# Patient Record
Sex: FEMALE | Race: WHITE | Employment: OTHER | URBAN - NONMETROPOLITAN AREA
[De-identification: names, ages, dates, MRNs, and addresses within clinical notes are randomized per-mention and may not be internally consistent; named-entity substitution may affect disease eponyms.]

---

## 2017-03-16 ENCOUNTER — OFFICE VISIT (OUTPATIENT)
Dept: VASCULAR SURGERY | Age: 62
End: 2017-03-16
Payer: MEDICARE

## 2017-03-16 VITALS
OXYGEN SATURATION: 94 % | SYSTOLIC BLOOD PRESSURE: 130 MMHG | DIASTOLIC BLOOD PRESSURE: 74 MMHG | RESPIRATION RATE: 18 BRPM | HEART RATE: 73 BPM | TEMPERATURE: 97.6 F

## 2017-03-16 DIAGNOSIS — I73.9 PVD (PERIPHERAL VASCULAR DISEASE) (HCC): ICD-10-CM

## 2017-03-16 PROCEDURE — G8484 FLU IMMUNIZE NO ADMIN: HCPCS | Performed by: NURSE PRACTITIONER

## 2017-03-16 PROCEDURE — 3014F SCREEN MAMMO DOC REV: CPT | Performed by: NURSE PRACTITIONER

## 2017-03-16 PROCEDURE — 99213 OFFICE O/P EST LOW 20 MIN: CPT | Performed by: NURSE PRACTITIONER

## 2017-03-16 PROCEDURE — 4004F PT TOBACCO SCREEN RCVD TLK: CPT | Performed by: NURSE PRACTITIONER

## 2017-03-16 PROCEDURE — 3017F COLORECTAL CA SCREEN DOC REV: CPT | Performed by: NURSE PRACTITIONER

## 2017-03-16 PROCEDURE — G8421 BMI NOT CALCULATED: HCPCS | Performed by: NURSE PRACTITIONER

## 2017-03-16 PROCEDURE — G8427 DOCREV CUR MEDS BY ELIG CLIN: HCPCS | Performed by: NURSE PRACTITIONER

## 2017-03-17 PROBLEM — I73.9 PVD (PERIPHERAL VASCULAR DISEASE) (HCC): Status: ACTIVE | Noted: 2017-03-17

## 2017-06-15 ENCOUNTER — OFFICE VISIT (OUTPATIENT)
Dept: CARDIOLOGY | Facility: CLINIC | Age: 62
End: 2017-06-15

## 2017-06-15 VITALS
WEIGHT: 175 LBS | BODY MASS INDEX: 31.01 KG/M2 | SYSTOLIC BLOOD PRESSURE: 150 MMHG | DIASTOLIC BLOOD PRESSURE: 90 MMHG | RESPIRATION RATE: 18 BRPM | HEIGHT: 63 IN

## 2017-06-15 DIAGNOSIS — Z72.0 TOBACCO USE: ICD-10-CM

## 2017-06-15 DIAGNOSIS — R06.00 DYSPNEA, UNSPECIFIED TYPE: ICD-10-CM

## 2017-06-15 DIAGNOSIS — R07.9 CHEST PAIN, UNSPECIFIED TYPE: Primary | ICD-10-CM

## 2017-06-15 DIAGNOSIS — I10 ESSENTIAL HYPERTENSION: ICD-10-CM

## 2017-06-15 PROCEDURE — 99214 OFFICE O/P EST MOD 30 MIN: CPT | Performed by: NURSE PRACTITIONER

## 2017-06-15 PROCEDURE — 93000 ELECTROCARDIOGRAM COMPLETE: CPT | Performed by: NURSE PRACTITIONER

## 2017-06-15 RX ORDER — VARENICLINE TARTRATE 0.5 MG/1
0.5 TABLET, FILM COATED ORAL 2 TIMES DAILY
COMMUNITY

## 2017-06-15 RX ORDER — OMEPRAZOLE 20 MG/1
CAPSULE, DELAYED RELEASE ORAL
COMMUNITY
End: 2017-06-15 | Stop reason: ALTCHOICE

## 2017-06-15 RX ORDER — AMITRIPTYLINE HYDROCHLORIDE 10 MG/1
10 TABLET, FILM COATED ORAL NIGHTLY
COMMUNITY

## 2017-06-15 RX ORDER — ALBUTEROL SULFATE 0.63 MG/3ML
SOLUTION RESPIRATORY (INHALATION)
COMMUNITY

## 2017-06-15 RX ORDER — LISINOPRIL 10 MG/1
TABLET ORAL
COMMUNITY
End: 2017-06-15 | Stop reason: SDUPTHER

## 2017-06-15 RX ORDER — DILTIAZEM HYDROCHLORIDE 180 MG/1
180 CAPSULE, COATED, EXTENDED RELEASE ORAL DAILY
COMMUNITY

## 2017-06-15 RX ORDER — CYCLOBENZAPRINE HCL 10 MG
10 TABLET ORAL 3 TIMES DAILY PRN
COMMUNITY
End: 2021-03-08

## 2017-06-15 RX ORDER — DICYCLOMINE HYDROCHLORIDE 10 MG/1
CAPSULE ORAL
COMMUNITY
End: 2017-07-27

## 2017-06-15 RX ORDER — LORATADINE 10 MG/1
10 TABLET ORAL DAILY
COMMUNITY
End: 2021-03-08

## 2017-06-15 RX ORDER — ALPRAZOLAM 0.25 MG/1
TABLET ORAL
COMMUNITY
End: 2017-07-27

## 2017-06-15 RX ORDER — LISINOPRIL 30 MG/1
30 TABLET ORAL DAILY
Qty: 30 TABLET | Refills: 11 | Status: SHIPPED | OUTPATIENT
Start: 2017-06-15 | End: 2017-07-27

## 2017-06-15 RX ORDER — ATORVASTATIN CALCIUM 20 MG/1
TABLET, FILM COATED ORAL
COMMUNITY
Start: 2015-02-23 | End: 2017-07-27

## 2017-06-15 RX ORDER — FUROSEMIDE 20 MG/1
40 TABLET ORAL
COMMUNITY
End: 2017-07-27

## 2017-06-15 RX ORDER — HYDROCODONE BITARTRATE AND ACETAMINOPHEN 7.5; 325 MG/1; MG/1
1 TABLET ORAL EVERY 4 HOURS PRN
COMMUNITY
End: 2021-02-21 | Stop reason: HOSPADM

## 2017-06-15 RX ORDER — GABAPENTIN 100 MG/1
CAPSULE ORAL 2 TIMES DAILY
COMMUNITY
End: 2017-07-27

## 2017-06-15 NOTE — PROGRESS NOTES
"    Subjective:     Encounter Date:06/15/2017      Patient ID: Yolanda Jacobs is a 61 y.o. female.    Chief Complaint:  HPI Comments: Pt reports intermittent chest pain with or without exertion, CASTELLANOS, fatigue, edema, and weight gain. She was started on lasix for edema and elevated BNP by per pcp recently.     Chest Pain    This is a recurrent problem. The current episode started more than 1 month ago. The problem has been gradually worsening. Associated symptoms include malaise/fatigue, shortness of breath and weakness. Pertinent negatives include no abdominal pain, claudication, cough, diaphoresis, dizziness, fever, hemoptysis, irregular heartbeat, nausea, near-syncope, orthopnea, palpitations, PND, sputum production, syncope or vomiting.       The following portions of the patient's history were reviewed and updated as appropriate: allergies, current medications, past family history, past medical history, past social history, past surgical history and problem list.  /90 (BP Location: Left arm, Patient Position: Sitting, Cuff Size: Adult)  Resp 18  Ht 63\" (160 cm)  Wt 175 lb (79.4 kg)  Breastfeeding? No  BMI 31 kg/m2  Allergies   Allergen Reactions   • Ceftin [Cefuroxime Axetil]    • Iodinated Diagnostic Agents        Current Outpatient Prescriptions:   •  albuterol (ACCUNEB) 0.63 MG/3ML nebulizer solution, Take 1 ampule by nebulization every 6 hours as needed for Wheezing, Disp: , Rfl:   •  ALPRAZolam (XANAX) 0.25 MG tablet, Take  by mouth., Disp: , Rfl:   •  amitriptyline (ELAVIL) 10 MG tablet, Take  by mouth., Disp: , Rfl:   •  atorvastatin (LIPITOR) 20 MG tablet, Take  by mouth., Disp: , Rfl:   •  cyclobenzaprine (FLEXERIL) 10 MG tablet, Take  by mouth., Disp: , Rfl:   •  dicyclomine (BENTYL) 10 MG capsule, Take  by mouth., Disp: , Rfl:   •  diltiaZEM CD (CARDIZEM CD) 180 MG 24 hr capsule, Take  by mouth., Disp: , Rfl:   •  esomeprazole (nexIUM) 20 MG capsule, Take 20 mg by mouth Every Morning Before " Breakfast., Disp: , Rfl:   •  fluticasone-salmeterol (ADVAIR) 250-50 MCG/DOSE DISKUS, Inhale., Disp: , Rfl:   •  furosemide (LASIX) 20 MG tablet, Take 40 mg by mouth., Disp: , Rfl:   •  gabapentin (NEURONTIN) 100 MG capsule, Take  by mouth., Disp: , Rfl:   •  HYDROcodone-acetaminophen (NORCO) 7.5-325 MG per tablet, Take  by mouth., Disp: , Rfl:   •  insulin detemir (LEVEMIR) 100 UNIT/ML injection, Inject  under the skin., Disp: , Rfl:   •  lisinopril (PRINIVIL,ZESTRIL) 30 MG tablet, Take 1 tablet by mouth Daily., Disp: 30 tablet, Rfl: 11  •  loratadine (CLARITIN) 10 MG tablet, Take  by mouth., Disp: , Rfl:   •  metFORMIN (GLUCOPHAGE) 1000 MG tablet, Take  by mouth., Disp: , Rfl:   •  omeprazole (priLOSEC) 20 MG capsule, Take  by mouth., Disp: , Rfl:   •  varenicline (CHANTIX) 0.5 MG tablet, Take 0.5 mg by mouth 2 (Two) Times a Day., Disp: , Rfl:   Past Medical History:   Diagnosis Date   • COPD (chronic obstructive pulmonary disease)    • Diabetes mellitus    • HTN (hypertension)    • Hyperlipidemia      Past Surgical History:   Procedure Laterality Date   • APPENDECTOMY     • CHOLECYSTECTOMY     • HYSTERECTOMY     • KNEE SURGERY       Social History     Social History   • Marital status:      Spouse name: N/A   • Number of children: N/A   • Years of education: N/A     Occupational History   • Not on file.     Social History Main Topics   • Smoking status: Current Every Day Smoker     Packs/day: 0.50   • Smokeless tobacco: Never Used   • Alcohol use No   • Drug use: No   • Sexual activity: Defer     Other Topics Concern   • Not on file     Social History Narrative     Family History   Problem Relation Age of Onset   • Diabetes Mother    • Cancer Mother    • Lung cancer Father    • No Known Problems Sister    • Cancer Brother        Review of Systems   Constitution: Positive for weakness, malaise/fatigue and weight gain. Negative for chills, diaphoresis and fever.   HENT: Negative for nosebleeds.    Eyes:  Negative for visual disturbance.   Cardiovascular: Positive for chest pain, dyspnea on exertion and leg swelling. Negative for claudication, cyanosis, irregular heartbeat, near-syncope, orthopnea, palpitations, paroxysmal nocturnal dyspnea and syncope.   Respiratory: Positive for shortness of breath. Negative for cough, hemoptysis, sputum production and wheezing.    Hematologic/Lymphatic: Negative for bleeding problem.   Skin: Negative for color change and flushing.   Musculoskeletal: Negative for falls.   Gastrointestinal: Negative for bloating, abdominal pain, hematemesis, hematochezia, melena, nausea and vomiting.   Genitourinary: Negative for hematuria.   Neurological: Negative for dizziness and light-headedness.   Psychiatric/Behavioral: Negative for altered mental status.         ECG 12 Lead  Date/Time: 6/15/2017 11:47 AM  Performed by: JOSE WORKMAN  Authorized by: JOSE WORKMAN   Comparison: compared with previous ECG from 7/22/2016  Similar to previous ECG  Rhythm: sinus rhythm               Objective:     Physical Exam   Constitutional: She is oriented to person, place, and time. She appears well-developed and well-nourished. No distress.   HENT:   Head: Normocephalic and atraumatic.   Eyes: Pupils are equal, round, and reactive to light.   Neck: Normal range of motion. Neck supple. No JVD present. No thyromegaly present.   Cardiovascular: Normal rate, regular rhythm, normal heart sounds and intact distal pulses.  Exam reveals no gallop and no friction rub.    No murmur heard.  Pulses:       Dorsalis pedis pulses are 2+ on the right side, and 2+ on the left side.   Pulmonary/Chest: Effort normal and breath sounds normal. No respiratory distress. She has no wheezes. She has no rales. She exhibits no tenderness.   Abdominal: Soft. Bowel sounds are normal. She exhibits no distension. There is no tenderness.   Musculoskeletal: Normal range of motion. She exhibits no edema.   Neurological: She is alert and  oriented to person, place, and time. No cranial nerve deficit.   Skin: Skin is warm and dry. She is not diaphoretic.   Psychiatric: She has a normal mood and affect. Her behavior is normal.       Lab Review:       Assessment:          Diagnosis Plan   1. Chest pain, unspecified type  Adult Stress Echo Only    Adult Transthoracic Echo Complete With Contrast   2. Dyspnea, unspecified type  Adult Stress Echo Only    Adult Transthoracic Echo Complete With Contrast  Elevated  BNP and reported edema- currently euvolemic on exam, however lasix was started by pcp. Check echo. Also repeat stress due to worsening CASTELLANOS and chest discomfort over the past couple of months.   Negative DSE 1 year ago. LVH was noted on stress at that time.    3. Essential hypertension  Elevated. Increase lisinopril to 30 mg daily    4. Tobacco use  Counseled on cessation x 5 minutes           Plan:       Follow up 1 month, sooner with new or worsening symptoms.  Counseled on s/s to report and when to report to ER.

## 2017-06-26 ENCOUNTER — HOSPITAL ENCOUNTER (OUTPATIENT)
Dept: CARDIOLOGY | Facility: HOSPITAL | Age: 62
Discharge: HOME | End: 2017-06-26

## 2017-06-26 ENCOUNTER — HOSPITAL ENCOUNTER (OUTPATIENT)
Dept: CARDIOLOGY | Facility: HOSPITAL | Age: 62
Discharge: HOME | End: 2017-06-26
Admitting: NURSE PRACTITIONER

## 2017-06-26 VITALS
DIASTOLIC BLOOD PRESSURE: 58 MMHG | WEIGHT: 168 LBS | SYSTOLIC BLOOD PRESSURE: 146 MMHG | HEIGHT: 63 IN | BODY MASS INDEX: 29.77 KG/M2

## 2017-06-26 VITALS — DIASTOLIC BLOOD PRESSURE: 74 MMHG | HEART RATE: 73 BPM | SYSTOLIC BLOOD PRESSURE: 149 MMHG

## 2017-06-26 DIAGNOSIS — R07.9 CHEST PAIN, UNSPECIFIED TYPE: ICD-10-CM

## 2017-06-26 DIAGNOSIS — R06.00 DYSPNEA, UNSPECIFIED TYPE: ICD-10-CM

## 2017-06-26 LAB
BH CV ECHO MEAS - AO MAX PG (FULL): 3.8 MMHG
BH CV ECHO MEAS - AO MAX PG: 8.4 MMHG
BH CV ECHO MEAS - AO MEAN PG (FULL): 2 MMHG
BH CV ECHO MEAS - AO MEAN PG: 4 MMHG
BH CV ECHO MEAS - AO ROOT AREA (BSA CORRECTED): 1.9
BH CV ECHO MEAS - AO ROOT AREA: 9.1 CM^2
BH CV ECHO MEAS - AO ROOT DIAM: 3.4 CM
BH CV ECHO MEAS - AO V2 MAX: 145 CM/SEC
BH CV ECHO MEAS - AO V2 MEAN: 93.9 CM/SEC
BH CV ECHO MEAS - AO V2 VTI: 30.2 CM
BH CV ECHO MEAS - AVA(I,A): 2.2 CM^2
BH CV ECHO MEAS - AVA(I,D): 2.2 CM^2
BH CV ECHO MEAS - AVA(V,A): 2.1 CM^2
BH CV ECHO MEAS - AVA(V,D): 2.1 CM^2
BH CV ECHO MEAS - BSA(HAYCOCK): 1.9 M^2
BH CV ECHO MEAS - BSA: 1.8 M^2
BH CV ECHO MEAS - BZI_BMI: 29.8 KILOGRAMS/M^2
BH CV ECHO MEAS - BZI_METRIC_HEIGHT: 160 CM
BH CV ECHO MEAS - BZI_METRIC_WEIGHT: 76.2 KG
BH CV ECHO MEAS - CONTRAST EF 4CH: 63 ML/M^2
BH CV ECHO MEAS - EDV(CUBED): 67.9 ML
BH CV ECHO MEAS - EDV(MOD-SP4): 107 ML
BH CV ECHO MEAS - EDV(TEICH): 73.4 ML
BH CV ECHO MEAS - EF(CUBED): 70.7 %
BH CV ECHO MEAS - EF(MOD-SP4): 63 %
BH CV ECHO MEAS - EF(TEICH): 62.8 %
BH CV ECHO MEAS - ESV(CUBED): 19.9 ML
BH CV ECHO MEAS - ESV(MOD-SP4): 39.6 ML
BH CV ECHO MEAS - ESV(TEICH): 27.3 ML
BH CV ECHO MEAS - FS: 33.6 %
BH CV ECHO MEAS - IVS/LVPW: 1.2
BH CV ECHO MEAS - IVSD: 1.4 CM
BH CV ECHO MEAS - LA DIMENSION: 4 CM
BH CV ECHO MEAS - LA/AO: 1.2
BH CV ECHO MEAS - LAT PEAK E' VEL: 4.9 CM/SEC
BH CV ECHO MEAS - LV DIASTOLIC VOL/BSA (35-75): 59.6 ML/M^2
BH CV ECHO MEAS - LV MASS(C)D: 194.3 GRAMS
BH CV ECHO MEAS - LV MASS(C)DI: 108.2 GRAMS/M^2
BH CV ECHO MEAS - LV MAX PG: 4.6 MMHG
BH CV ECHO MEAS - LV MEAN PG: 2 MMHG
BH CV ECHO MEAS - LV SYSTOLIC VOL/BSA (12-30): 22.1 ML/M^2
BH CV ECHO MEAS - LV V1 MAX: 107 CM/SEC
BH CV ECHO MEAS - LV V1 MEAN: 68.2 CM/SEC
BH CV ECHO MEAS - LV V1 VTI: 23.9 CM
BH CV ECHO MEAS - LVIDD: 4.1 CM
BH CV ECHO MEAS - LVIDS: 2.7 CM
BH CV ECHO MEAS - LVLD AP4: 7.8 CM
BH CV ECHO MEAS - LVLS AP4: 6.6 CM
BH CV ECHO MEAS - LVOT AREA (M): 2.8 CM^2
BH CV ECHO MEAS - LVOT AREA: 2.8 CM^2
BH CV ECHO MEAS - LVOT DIAM: 1.9 CM
BH CV ECHO MEAS - LVPWD: 1.2 CM
BH CV ECHO MEAS - MED PEAK E' VEL: 3.59 CM/SEC
BH CV ECHO MEAS - MV A MAX VEL: 97 CM/SEC
BH CV ECHO MEAS - MV DEC TIME: 0.3 SEC
BH CV ECHO MEAS - MV E MAX VEL: 61.6 CM/SEC
BH CV ECHO MEAS - MV E/A: 0.64
BH CV ECHO MEAS - RAP SYSTOLE: 10 MMHG
BH CV ECHO MEAS - RVSP: 27 MMHG
BH CV ECHO MEAS - SI(AO): 152.7 ML/M^2
BH CV ECHO MEAS - SI(CUBED): 26.7 ML/M^2
BH CV ECHO MEAS - SI(LVOT): 37.7 ML/M^2
BH CV ECHO MEAS - SI(MOD-SP4): 37.5 ML/M^2
BH CV ECHO MEAS - SI(TEICH): 25.7 ML/M^2
BH CV ECHO MEAS - SV(AO): 274.2 ML
BH CV ECHO MEAS - SV(CUBED): 48 ML
BH CV ECHO MEAS - SV(LVOT): 67.8 ML
BH CV ECHO MEAS - SV(MOD-SP4): 67.4 ML
BH CV ECHO MEAS - SV(TEICH): 46.1 ML
BH CV ECHO MEAS - TR MAX VEL: 206 CM/SEC
BH CV STRESS BP STAGE 1: NORMAL
BH CV STRESS BP STAGE 2: NORMAL
BH CV STRESS BP STAGE 3: NORMAL
BH CV STRESS DOSE DOBUTAMINE STAGE 1: 10
BH CV STRESS DOSE DOBUTAMINE STAGE 2: 20
BH CV STRESS DOSE DOBUTAMINE STAGE 3: 30
BH CV STRESS DURATION MIN STAGE 1: 3
BH CV STRESS DURATION MIN STAGE 2: 3
BH CV STRESS DURATION MIN STAGE 3: 1
BH CV STRESS DURATION SEC STAGE 1: 0
BH CV STRESS DURATION SEC STAGE 2: 0
BH CV STRESS DURATION SEC STAGE 3: 36
BH CV STRESS HR STAGE 1: 91
BH CV STRESS HR STAGE 2: 133
BH CV STRESS HR STAGE 3: 144
BH CV STRESS PROTOCOL 1: NORMAL
BH CV STRESS RECOVERY BP: NORMAL MMHG
BH CV STRESS RECOVERY HR: 98 BPM
BH CV STRESS STAGE 1: 1
BH CV STRESS STAGE 2: 2
BH CV STRESS STAGE 3: 3
LEFT ATRIUM VOLUME INDEX: 42.8 ML/M2
LEFT ATRIUM VOLUME: 23.8 CM3
LV EF 2D ECHO EST: 65 %
MAXIMAL PREDICTED HEART RATE: 161 BPM
PERCENT MAX PREDICTED HR: 89.44 %
STRESS BASELINE BP: NORMAL MMHG
STRESS BASELINE HR: 72 BPM
STRESS PERCENT HR: 105 %
STRESS POST EXERCISE DUR MIN: 7 MIN
STRESS POST EXERCISE DUR SEC: 36 SEC
STRESS POST PEAK BP: NORMAL MMHG
STRESS POST PEAK HR: 144 BPM
STRESS TARGET HR: 137 BPM

## 2017-06-26 PROCEDURE — 25010000003 DOBUTAMINE PER 250 MG: Performed by: INTERNAL MEDICINE

## 2017-06-26 PROCEDURE — 25010000002 PERFLUTREN (DEFINITY) 8.476 MG IN SODIUM CHLORIDE 10 ML INJECTION: Performed by: INTERNAL MEDICINE

## 2017-06-26 PROCEDURE — 93017 CV STRESS TEST TRACING ONLY: CPT

## 2017-06-26 PROCEDURE — 63710000001 NITROGLYCERIN 0.4 MG SUBLINGUAL TABLET 25 EACH BOTTLE: Performed by: INTERNAL MEDICINE

## 2017-06-26 PROCEDURE — 93350 STRESS TTE ONLY: CPT | Performed by: INTERNAL MEDICINE

## 2017-06-26 PROCEDURE — A9270 NON-COVERED ITEM OR SERVICE: HCPCS | Performed by: INTERNAL MEDICINE

## 2017-06-26 PROCEDURE — 93352 ADMIN ECG CONTRAST AGENT: CPT | Performed by: INTERNAL MEDICINE

## 2017-06-26 PROCEDURE — 93306 TTE W/DOPPLER COMPLETE: CPT

## 2017-06-26 PROCEDURE — 93018 CV STRESS TEST I&R ONLY: CPT | Performed by: INTERNAL MEDICINE

## 2017-06-26 PROCEDURE — C8928 TTE W OR W/O FOL W/CON,STRES: HCPCS

## 2017-06-26 RX ORDER — NITROGLYCERIN 0.4 MG/1
0.4 TABLET SUBLINGUAL
Status: DISCONTINUED | OUTPATIENT
Start: 2017-06-26 | End: 2017-06-27 | Stop reason: HOSPADM

## 2017-06-26 RX ORDER — DOBUTAMINE HYDROCHLORIDE 100 MG/100ML
10-50 INJECTION INTRAVENOUS
Status: DISCONTINUED | OUTPATIENT
Start: 2017-06-26 | End: 2017-06-27 | Stop reason: HOSPADM

## 2017-06-26 RX ADMIN — SODIUM CHLORIDE 10 ML: 9 INJECTION INTRAMUSCULAR; INTRAVENOUS; SUBCUTANEOUS at 10:03

## 2017-06-26 RX ADMIN — DOBUTAMINE 30 MCG/KG/MIN: 12.5 INJECTION, SOLUTION, CONCENTRATE INTRAVENOUS at 09:56

## 2017-06-26 RX ADMIN — NITROGLYCERIN 0.4 MG: 0.4 TABLET SUBLINGUAL at 10:13

## 2017-06-26 NOTE — PROGRESS NOTES
I called pt to let her know the results and that she needs a [facility] DNA test.  She wants to wait until after July 3rd to have it done because of the travel distance to [place].  I told her the paperwork/order will be at my desk when she comes to get it done she will need to come to our office to pick this up before going down to the lab.  She stated understanding.  Delio Beavers, CMA

## 2017-07-17 ENCOUNTER — TELEPHONE (OUTPATIENT)
Dept: CARDIOLOGY | Facility: CLINIC | Age: 62
End: 2017-07-17

## 2017-07-17 ENCOUNTER — OFFICE VISIT (OUTPATIENT)
Dept: CARDIOLOGY | Facility: CLINIC | Age: 62
End: 2017-07-17

## 2017-07-17 VITALS
RESPIRATION RATE: 18 BRPM | BODY MASS INDEX: 29.59 KG/M2 | SYSTOLIC BLOOD PRESSURE: 135 MMHG | DIASTOLIC BLOOD PRESSURE: 80 MMHG | HEART RATE: 106 BPM | WEIGHT: 167 LBS | HEIGHT: 63 IN

## 2017-07-17 DIAGNOSIS — Z72.0 TOBACCO USE: ICD-10-CM

## 2017-07-17 DIAGNOSIS — IMO0001 UNCONTROLLED TYPE 2 DIABETES MELLITUS WITHOUT COMPLICATION, WITHOUT LONG-TERM CURRENT USE OF INSULIN: ICD-10-CM

## 2017-07-17 DIAGNOSIS — R07.9 CHEST PAIN, UNSPECIFIED TYPE: Primary | ICD-10-CM

## 2017-07-17 DIAGNOSIS — I10 ESSENTIAL HYPERTENSION: ICD-10-CM

## 2017-07-17 DIAGNOSIS — R06.09 DOE (DYSPNEA ON EXERTION): ICD-10-CM

## 2017-07-17 PROBLEM — IMO0002 UNCONTROLLED TYPE 2 DIABETES MELLITUS, WITHOUT LONG-TERM CURRENT USE OF INSULIN: Status: ACTIVE | Noted: 2017-07-17

## 2017-07-17 PROCEDURE — 99213 OFFICE O/P EST LOW 20 MIN: CPT | Performed by: NURSE PRACTITIONER

## 2017-07-17 PROCEDURE — 93000 ELECTROCARDIOGRAM COMPLETE: CPT | Performed by: NURSE PRACTITIONER

## 2017-07-17 NOTE — TELEPHONE ENCOUNTER
----- Message from SIMONA Sanchez sent at 7/17/2017 11:00 AM CDT -----  Please remind pt to take Aspirin 81 mg once a day. Thanks.

## 2017-07-17 NOTE — PROGRESS NOTES
"    Subjective:     Encounter Date:07/17/2017      Patient ID: Yolanda Jacobs is a 62 y.o. female.    Chief Complaint:  HPI Comments: Pt reports she continues to be fatigued, CASTELLANOS, and has chest pain approximately 3 x day- non exertional, lasts several minutes at a time. Edema is currently controlled.     Chest Pain    This is a recurrent problem. The current episode started more than 1 month ago. The problem occurs 2 to 4 times per day. The problem has been gradually worsening. Associated symptoms include malaise/fatigue. Pertinent negatives include no abdominal pain, claudication, cough, diaphoresis, dizziness, fever, hemoptysis, irregular heartbeat, nausea, near-syncope, orthopnea, palpitations, PND, shortness of breath, sputum production, syncope, vomiting or weakness.       The following portions of the patient's history were reviewed and updated as appropriate: allergies, current medications, past family history, past medical history, past social history, past surgical history and problem list.  /80 (BP Location: Left arm, Patient Position: Sitting, Cuff Size: Adult)  Pulse 106  Resp 18  Ht 63\" (160 cm)  Wt 167 lb (75.8 kg)  Breastfeeding? No  BMI 29.58 kg/m2  Allergies   Allergen Reactions   • Ceftin [Cefuroxime Axetil]    • Iodinated Diagnostic Agents        Current Outpatient Prescriptions:   •  albuterol (ACCUNEB) 0.63 MG/3ML nebulizer solution, Take 1 ampule by nebulization every 6 hours as needed for Wheezing, Disp: , Rfl:   •  ALPRAZolam (XANAX) 0.25 MG tablet, Take  by mouth., Disp: , Rfl:   •  amitriptyline (ELAVIL) 10 MG tablet, Take  by mouth., Disp: , Rfl:   •  atorvastatin (LIPITOR) 20 MG tablet, Take  by mouth., Disp: , Rfl:   •  cyclobenzaprine (FLEXERIL) 10 MG tablet, Take  by mouth., Disp: , Rfl:   •  dicyclomine (BENTYL) 10 MG capsule, Take  by mouth., Disp: , Rfl:   •  diltiaZEM CD (CARDIZEM CD) 180 MG 24 hr capsule, Take  by mouth., Disp: , Rfl:   •  esomeprazole (nexIUM) 20 MG " capsule, Take 20 mg by mouth Every Morning Before Breakfast., Disp: , Rfl:   •  fluticasone-salmeterol (ADVAIR) 250-50 MCG/DOSE DISKUS, Inhale., Disp: , Rfl:   •  furosemide (LASIX) 20 MG tablet, Take 40 mg by mouth., Disp: , Rfl:   •  gabapentin (NEURONTIN) 100 MG capsule, Take  by mouth 2 (Two) Times a Day., Disp: , Rfl:   •  HYDROcodone-acetaminophen (NORCO) 7.5-325 MG per tablet, Take  by mouth., Disp: , Rfl:   •  insulin detemir (LEVEMIR) 100 UNIT/ML injection, Inject  under the skin., Disp: , Rfl:   •  lisinopril (PRINIVIL,ZESTRIL) 30 MG tablet, Take 1 tablet by mouth Daily., Disp: 30 tablet, Rfl: 11  •  loratadine (CLARITIN) 10 MG tablet, Take  by mouth., Disp: , Rfl:   •  metFORMIN (GLUCOPHAGE) 1000 MG tablet, Take  by mouth Daily With Breakfast., Disp: , Rfl:   •  varenicline (CHANTIX) 0.5 MG tablet, Take 0.5 mg by mouth 2 (Two) Times a Day., Disp: , Rfl:   Past Medical History:   Diagnosis Date   • COPD (chronic obstructive pulmonary disease)    • Diabetes mellitus    • HTN (hypertension)    • Hyperlipidemia      Past Surgical History:   Procedure Laterality Date   • APPENDECTOMY     • CHOLECYSTECTOMY     • HYSTERECTOMY     • KNEE SURGERY       Social History     Social History   • Marital status: Legally      Spouse name: N/A   • Number of children: N/A   • Years of education: N/A     Occupational History   • Not on file.     Social History Main Topics   • Smoking status: Current Every Day Smoker     Packs/day: 0.50   • Smokeless tobacco: Never Used   • Alcohol use No   • Drug use: No   • Sexual activity: Defer     Other Topics Concern   • Not on file     Social History Narrative     Family History   Problem Relation Age of Onset   • Diabetes Mother    • Cancer Mother    • Lung cancer Father    • No Known Problems Sister    • Cancer Brother        Review of Systems   Constitution: Positive for malaise/fatigue. Negative for chills, diaphoresis, fever and weakness.   HENT: Negative for nosebleeds.     Eyes: Negative for visual disturbance.   Cardiovascular: Positive for chest pain and dyspnea on exertion. Negative for claudication, cyanosis, irregular heartbeat, leg swelling, near-syncope, orthopnea, palpitations, paroxysmal nocturnal dyspnea and syncope.   Respiratory: Negative for cough, hemoptysis, shortness of breath, sputum production and wheezing.    Hematologic/Lymphatic: Negative for bleeding problem.   Skin: Negative for color change and flushing.   Musculoskeletal: Negative for falls.   Gastrointestinal: Negative for bloating, abdominal pain, hematemesis, hematochezia, melena, nausea and vomiting.   Genitourinary: Negative for hematuria.   Neurological: Negative for dizziness and light-headedness.   Psychiatric/Behavioral: Negative for altered mental status.         ECG 12 Lead  Date/Time: 7/17/2017 10:51 AM  Performed by: JOSE WORKMAN  Authorized by: JOSE WORKMAN   Comparison: compared with previous ECG from 6/15/2017  Similar to previous ECG  Rhythm: sinus tachycardia               Objective:     Physical Exam   Constitutional: She is oriented to person, place, and time. She appears well-developed and well-nourished. No distress.   HENT:   Head: Normocephalic and atraumatic.   Eyes: Pupils are equal, round, and reactive to light.   Neck: Normal range of motion. Neck supple. No JVD present. No thyromegaly present.   Cardiovascular: Regular rhythm, normal heart sounds and intact distal pulses.  Tachycardia present.  Exam reveals no gallop and no friction rub.    No murmur heard.  Pulses:       Dorsalis pedis pulses are 2+ on the right side, and 2+ on the left side.   Pulmonary/Chest: Effort normal. No respiratory distress. She has decreased breath sounds. She has no wheezes. She has no rales. She exhibits no tenderness.   Abdominal: Soft. Bowel sounds are normal. She exhibits no distension. There is no tenderness.   Musculoskeletal: Normal range of motion. She exhibits no edema.   Neurological: She is  alert and oriented to person, place, and time. No cranial nerve deficit.   Skin: Skin is warm and dry. She is not diaphoretic.   Psychiatric: She has a normal mood and affect. Her behavior is normal.       Lab Review:       Assessment:          Diagnosis Plan   1. Chest pain, unspecified type  Stress negative by EKG and echo, but clinically suspicious for angina- discussed with Dr. Pappas who recommends [facility] DNA testing- order given to patient  Discussed adding beta blocker for htn, tachycardia, possible angina- pt declines and wants to see [facility] result first.   Add ASA 81 mg daily  Continue statin, ACEI, ccb    2. CASTELLANOS (dyspnea on exertion)     3. Uncontrolled type 2 diabetes mellitus without complication, without long-term current use of insulin  Followed by pcp   4. Essential hypertension  Improved compared to last OV; lisinopril increased last OV   5. Tobacco use  Counseled on cessation x 4 min; she reports she is taking chantix and trying to quit     Reviewed echo and stress results with patient.     Plan:       [facility] DNA test  Follow up 1 month, sooner with new or worsening symptoms.  Reviewed s/s to report and when to call EMS or report to ER.

## 2017-07-27 ENCOUNTER — APPOINTMENT (OUTPATIENT)
Dept: GENERAL RADIOLOGY | Facility: HOSPITAL | Age: 62
End: 2017-07-27

## 2017-07-27 ENCOUNTER — HOSPITAL ENCOUNTER (EMERGENCY)
Facility: HOSPITAL | Age: 62
Discharge: HOME | End: 2017-07-27
Attending: FAMILY MEDICINE | Admitting: FAMILY MEDICINE

## 2017-07-27 VITALS
BODY MASS INDEX: 28.35 KG/M2 | HEART RATE: 80 BPM | RESPIRATION RATE: 19 BRPM | TEMPERATURE: 98.3 F | OXYGEN SATURATION: 100 % | HEIGHT: 63 IN | DIASTOLIC BLOOD PRESSURE: 62 MMHG | SYSTOLIC BLOOD PRESSURE: 131 MMHG | WEIGHT: 160 LBS

## 2017-07-27 DIAGNOSIS — R07.9 CHEST PAIN, UNSPECIFIED TYPE: Primary | ICD-10-CM

## 2017-07-27 DIAGNOSIS — N30.90 CYSTITIS: ICD-10-CM

## 2017-07-27 LAB
ALBUMIN SERPL-MCNC: 3.9 G/DL (ref 3.5–5)
ALBUMIN/GLOB SERPL: 1.4 G/DL (ref 1.1–2.5)
ALP SERPL-CCNC: 115 U/L (ref 24–120)
ALT SERPL W P-5'-P-CCNC: 43 U/L (ref 0–54)
AMYLASE SERPL-CCNC: 44 U/L (ref 30–110)
ANION GAP SERPL CALCULATED.3IONS-SCNC: 10 MMOL/L (ref 4–13)
AST SERPL-CCNC: 35 U/L (ref 7–45)
BACTERIA UR QL AUTO: ABNORMAL /HPF
BASOPHILS # BLD AUTO: 0.01 10*3/MM3 (ref 0–0.2)
BASOPHILS NFR BLD AUTO: 0.1 % (ref 0–2)
BILIRUB SERPL-MCNC: 0.8 MG/DL (ref 0.1–1)
BILIRUB UR QL STRIP: NEGATIVE
BUN BLD-MCNC: 15 MG/DL (ref 5–21)
BUN/CREAT SERPL: 17 (ref 7–25)
CALCIUM SPEC-SCNC: 9.6 MG/DL (ref 8.4–10.4)
CHLORIDE SERPL-SCNC: 101 MMOL/L (ref 98–110)
CK MB SERPL-CCNC: 0.44 NG/ML (ref 0–5)
CK SERPL-CCNC: 39 U/L (ref 0–203)
CLARITY UR: ABNORMAL
CO2 SERPL-SCNC: 23 MMOL/L (ref 24–31)
COLOR UR: YELLOW
CREAT BLD-MCNC: 0.88 MG/DL (ref 0.5–1.4)
D DIMER PPP FEU-MCNC: 0.48 MG/L (FEU) (ref 0–0.5)
DEPRECATED RDW RBC AUTO: 40.1 FL (ref 40–54)
EOSINOPHIL # BLD AUTO: 0.1 10*3/MM3 (ref 0–0.7)
EOSINOPHIL NFR BLD AUTO: 1 % (ref 0–4)
ERYTHROCYTE [DISTWIDTH] IN BLOOD BY AUTOMATED COUNT: 12.5 % (ref 12–15)
GFR SERPL CREATININE-BSD FRML MDRD: 66 ML/MIN/1.73
GLOBULIN UR ELPH-MCNC: 2.7 GM/DL
GLUCOSE BLD-MCNC: 360 MG/DL (ref 70–100)
GLUCOSE UR STRIP-MCNC: ABNORMAL MG/DL
HCT VFR BLD AUTO: 42.4 % (ref 37–47)
HGB BLD-MCNC: 14.5 G/DL (ref 12–16)
HGB UR QL STRIP.AUTO: ABNORMAL
HOLD SPECIMEN: NORMAL
HOLD SPECIMEN: NORMAL
HYALINE CASTS UR QL AUTO: ABNORMAL /LPF
IMM GRANULOCYTES # BLD: 0.04 10*3/MM3 (ref 0–0.03)
IMM GRANULOCYTES NFR BLD: 0.4 % (ref 0–5)
KETONES UR QL STRIP: NEGATIVE
LEUKOCYTE ESTERASE UR QL STRIP.AUTO: ABNORMAL
LIPASE SERPL-CCNC: 20 U/L (ref 23–203)
LYMPHOCYTES # BLD AUTO: 3.29 10*3/MM3 (ref 0.72–4.86)
LYMPHOCYTES NFR BLD AUTO: 33.8 % (ref 15–45)
MCH RBC QN AUTO: 30.3 PG (ref 28–32)
MCHC RBC AUTO-ENTMCNC: 34.2 G/DL (ref 33–36)
MCV RBC AUTO: 88.7 FL (ref 82–98)
MONOCYTES # BLD AUTO: 0.46 10*3/MM3 (ref 0.19–1.3)
MONOCYTES NFR BLD AUTO: 4.7 % (ref 4–12)
NEUTROPHILS # BLD AUTO: 5.84 10*3/MM3 (ref 1.87–8.4)
NEUTROPHILS NFR BLD AUTO: 60 % (ref 39–78)
NITRITE UR QL STRIP: POSITIVE
PH UR STRIP.AUTO: <=5 [PH] (ref 5–8)
PLATELET # BLD AUTO: 228 10*3/MM3 (ref 130–400)
PMV BLD AUTO: 12.8 FL (ref 6–12)
POTASSIUM BLD-SCNC: 4.1 MMOL/L (ref 3.5–5.3)
PROT SERPL-MCNC: 6.6 G/DL (ref 6.3–8.7)
PROT UR QL STRIP: ABNORMAL
RBC # BLD AUTO: 4.78 10*6/MM3 (ref 4.2–5.4)
RBC # UR: ABNORMAL /HPF
REF LAB TEST METHOD: ABNORMAL
SODIUM BLD-SCNC: 134 MMOL/L (ref 135–145)
SP GR UR STRIP: 1.02 (ref 1–1.03)
SQUAMOUS #/AREA URNS HPF: ABNORMAL /HPF
TROPONIN I SERPL-MCNC: 0.01 NG/ML (ref 0–0.03)
TROPONIN I SERPL-MCNC: <0.012 NG/ML (ref 0–0.03)
UROBILINOGEN UR QL STRIP: ABNORMAL
WBC NRBC COR # BLD: 9.74 10*3/MM3 (ref 4.8–10.8)
WBC UR QL AUTO: ABNORMAL /HPF
WHOLE BLOOD HOLD SPECIMEN: NORMAL
WHOLE BLOOD HOLD SPECIMEN: NORMAL

## 2017-07-27 PROCEDURE — 82553 CREATINE MB FRACTION: CPT | Performed by: FAMILY MEDICINE

## 2017-07-27 PROCEDURE — 87186 SC STD MICRODIL/AGAR DIL: CPT | Performed by: FAMILY MEDICINE

## 2017-07-27 PROCEDURE — 85025 COMPLETE CBC W/AUTO DIFF WBC: CPT | Performed by: FAMILY MEDICINE

## 2017-07-27 PROCEDURE — 81003 URINALYSIS AUTO W/O SCOPE: CPT | Performed by: FAMILY MEDICINE

## 2017-07-27 PROCEDURE — 93010 ELECTROCARDIOGRAM REPORT: CPT | Performed by: INTERNAL MEDICINE

## 2017-07-27 PROCEDURE — 85379 FIBRIN DEGRADATION QUANT: CPT | Performed by: FAMILY MEDICINE

## 2017-07-27 PROCEDURE — 71010 HC CHEST PA OR AP: CPT

## 2017-07-27 PROCEDURE — 99284 EMERGENCY DEPT VISIT MOD MDM: CPT

## 2017-07-27 PROCEDURE — 82150 ASSAY OF AMYLASE: CPT | Performed by: FAMILY MEDICINE

## 2017-07-27 PROCEDURE — 93005 ELECTROCARDIOGRAM TRACING: CPT | Performed by: FAMILY MEDICINE

## 2017-07-27 PROCEDURE — 82550 ASSAY OF CK (CPK): CPT | Performed by: FAMILY MEDICINE

## 2017-07-27 PROCEDURE — 87088 URINE BACTERIA CULTURE: CPT | Performed by: FAMILY MEDICINE

## 2017-07-27 PROCEDURE — 80053 COMPREHEN METABOLIC PANEL: CPT | Performed by: FAMILY MEDICINE

## 2017-07-27 PROCEDURE — 87086 URINE CULTURE/COLONY COUNT: CPT | Performed by: FAMILY MEDICINE

## 2017-07-27 PROCEDURE — 84484 ASSAY OF TROPONIN QUANT: CPT | Performed by: FAMILY MEDICINE

## 2017-07-27 PROCEDURE — 81001 URINALYSIS AUTO W/SCOPE: CPT | Performed by: FAMILY MEDICINE

## 2017-07-27 PROCEDURE — 83690 ASSAY OF LIPASE: CPT | Performed by: FAMILY MEDICINE

## 2017-07-27 RX ORDER — ALPRAZOLAM 1 MG/1
1 TABLET ORAL 3 TIMES DAILY PRN
COMMUNITY

## 2017-07-27 RX ORDER — ATORVASTATIN CALCIUM 40 MG/1
40 TABLET, FILM COATED ORAL DAILY
COMMUNITY

## 2017-07-27 RX ORDER — ESOMEPRAZOLE MAGNESIUM 40 MG/1
40 CAPSULE, DELAYED RELEASE ORAL
COMMUNITY

## 2017-07-27 RX ORDER — FUROSEMIDE 40 MG/1
40 TABLET ORAL 2 TIMES DAILY
COMMUNITY

## 2017-07-27 RX ORDER — SULFAMETHOXAZOLE AND TRIMETHOPRIM 400; 80 MG/1; MG/1
1 TABLET ORAL 2 TIMES DAILY
Qty: 20 TABLET | Refills: 0 | Status: SHIPPED | OUTPATIENT
Start: 2017-07-27 | End: 2021-03-08

## 2017-07-27 RX ORDER — LISINOPRIL 20 MG/1
40 TABLET ORAL DAILY
COMMUNITY

## 2017-07-27 RX ORDER — LEVOTHYROXINE SODIUM 0.03 MG/1
25 TABLET ORAL DAILY
COMMUNITY

## 2017-07-27 RX ORDER — GABAPENTIN 300 MG/1
300 CAPSULE ORAL 3 TIMES DAILY
COMMUNITY
End: 2017-09-28

## 2017-07-27 RX ORDER — ERGOCALCIFEROL 1.25 MG/1
50000 CAPSULE ORAL WEEKLY
COMMUNITY
End: 2017-07-27

## 2017-07-28 ENCOUNTER — TELEPHONE (OUTPATIENT)
Dept: CARDIOLOGY | Facility: CLINIC | Age: 62
End: 2017-07-28

## 2017-07-28 LAB — BACTERIA SPEC AEROBE CULT: ABNORMAL

## 2017-07-28 NOTE — ED PROVIDER NOTES
Subjective   HPI Comments: Impression presents today for chest pain.  Patient reports that her chest pain is been going on for several days.  She reports this pain goes across her chest she has been seen several times for this I has not been told what was going on.  She had no treatment prior to arrival.  She describes the pain as sharp in the center of her chest.  Patient reports that she does continue to smoke she is tried very hard to quit however his had no real success with this.  In addition she also states that she has had some success in getting her glucose under control but is still runs a 2 to 300s most of the time.      History provided by:  Patient   used: No        Review of Systems   Constitutional: Negative.    HENT: Negative.    Eyes: Negative.    Respiratory: Negative.  Negative for cough, shortness of breath, wheezing and stridor.    Cardiovascular: Positive for chest pain.   Gastrointestinal: Negative.    Endocrine: Negative.    Genitourinary: Negative.    Musculoskeletal: Negative.    Skin: Negative.    Allergic/Immunologic: Negative.    Neurological: Negative.    Hematological: Negative.    Psychiatric/Behavioral: Negative.    All other systems reviewed and are negative.      Past Medical History:   Diagnosis Date   • COPD (chronic obstructive pulmonary disease)    • Diabetes mellitus    • HTN (hypertension)    • Hyperlipidemia        Allergies   Allergen Reactions   • Ceftin [Cefuroxime Axetil]    • Iodinated Diagnostic Agents        Past Surgical History:   Procedure Laterality Date   • APPENDECTOMY     • CHOLECYSTECTOMY     • HYSTERECTOMY     • KNEE SURGERY         Family History   Problem Relation Age of Onset   • Diabetes Mother    • Cancer Mother    • Lung cancer Father    • No Known Problems Sister    • Cancer Brother        Social History     Social History   • Marital status: Legally      Spouse name: N/A   • Number of children: N/A   • Years of education:  N/A     Social History Main Topics   • Smoking status: Current Every Day Smoker     Packs/day: 0.50   • Smokeless tobacco: Never Used   • Alcohol use No   • Drug use: No   • Sexual activity: Defer     Other Topics Concern   • None     Social History Narrative       Lab Results (last 24 hours)     Procedure Component Value Units Date/Time    Troponin [[id number]]  (Normal) Collected:  07/27/17 1509    Specimen:  Blood Updated:  07/27/17 1547     Troponin I <0.012 ng/mL     CBC & Differential [[id number]] Collected:  07/27/17 1509    Specimen:  Blood Updated:  07/27/17 1542    Narrative:       The following orders were created for panel order CBC & Differential.  Procedure                               Abnormality         Status                     ---------                               -----------         ------                     CBC Auto Differential[[id number]]        Abnormal            Final result                 Please view results for these tests on the individual orders.    Comprehensive Metabolic Panel [[id number]]  (Abnormal) Collected:  07/27/17 1509    Specimen:  Blood Updated:  07/27/17 1548     Glucose 360 (H) mg/dL      BUN 15 mg/dL       Specimen hemolyzed. Results may be affected.        Creatinine 0.88 mg/dL      Sodium 134 (L) mmol/L      Potassium 4.1 mmol/L       Specimen hemolyzed.  Results may be affected.        Chloride 101 mmol/L      CO2 23.0 (L) mmol/L      Calcium 9.6 mg/dL      Total Protein 6.6 g/dL      Albumin 3.90 g/dL      ALT (SGPT) 43 U/L       Specimen hemolyzed.  Results may be affected.        AST (SGOT) 35 U/L       Specimen hemolyzed.  Results may be affected.        Alkaline Phosphatase 115 U/L       Specimen hemolyzed. Results may be affected.        Total Bilirubin 0.8 mg/dL      eGFR Non African Amer 66 mL/min/1.73      Globulin 2.7 gm/dL      A/G Ratio 1.4 g/dL      BUN/Creatinine Ratio 17.0     Anion Gap 10.0 mmol/L     Amylase [[id number]]  (Normal) Collected:  07/27/17  1509    Specimen:  Blood Updated:  07/27/17 1527     Amylase 44 U/L     Lipase [[id number]]  (Abnormal) Collected:  07/27/17 1509    Specimen:  Blood Updated:  07/27/17 1528     Lipase 20 (L) U/L     D-dimer, Quantitative [[id number]]  (Normal) Collected:  07/27/17 1509    Specimen:  Blood Updated:  07/27/17 1548     D-Dimer, Quantitative 0.48 mg/L (FEU)     Narrative:       Reference Range is 0-0.50 mg/L FEU. However, results <0.50 mg/L FEU tends to rule out DVT or PE. Results >0.50 mg/L FEU are not useful in predicting absence or presence of DVT or PE.    CK-MB [[id number]]  (Normal) Collected:  07/27/17 1509    Specimen:  Blood Updated:  07/27/17 1536     CKMB 0.44 ng/mL     Narrative:       CKMB Index not indicated    CK [[id number]]  (Normal) Collected:  07/27/17 1509    Specimen:  Blood Updated:  07/27/17 1536     Creatine Kinase 39 U/L     CBC Auto Differential [[id number]]  (Abnormal) Collected:  07/27/17 1509    Specimen:  Blood Updated:  07/27/17 1542     WBC 9.74 10*3/mm3      RBC 4.78 10*6/mm3      Hemoglobin 14.5 g/dL      Hematocrit 42.4 %      MCV 88.7 fL      MCH 30.3 pg      MCHC 34.2 g/dL      RDW 12.5 %      RDW-SD 40.1 fl      MPV 12.8 (H) fL      Platelets 228 10*3/mm3      Neutrophil % 60.0 %      Lymphocyte % 33.8 %      Monocyte % 4.7 %      Eosinophil % 1.0 %      Basophil % 0.1 %      Immature Grans % 0.4 %      Neutrophils, Absolute 5.84 10*3/mm3      Lymphocytes, Absolute 3.29 10*3/mm3      Monocytes, Absolute 0.46 10*3/mm3      Eosinophils, Absolute 0.10 10*3/mm3      Basophils, Absolute 0.01 10*3/mm3      Immature Grans, Absolute 0.04 (H) 10*3/mm3     Urinalysis With / Culture If Indicated [[id number]]  (Abnormal) Collected:  07/27/17 1700    Specimen:  Urine from Urine, Clean Catch Updated:  07/27/17 1320     Color, UA Yellow     Appearance, UA Cloudy (A)     pH, UA <=5.0     Specific Gravity, UA 1.018     Glucose,  mg/dL (1+) (A)     Ketones, UA Negative     Bilirubin, UA Negative      "Blood, UA Moderate (2+) (A)     Protein,  mg/dL (2+) (A)     Leuk Esterase, UA Large (3+) (A)     Nitrite, UA Positive (A)     Urobilinogen, UA 0.2 E.U./dL    Urine Culture [[id number]] Collected:  07/27/17 1700    Specimen:  Urine from Urine, Clean Catch Updated:  07/27/17 1714    Urinalysis, Microscopic Only [[id number]]  (Abnormal) Collected:  07/27/17 1700    Specimen:  Urine from Urine, Clean Catch Updated:  07/27/17 1727     RBC, UA 6-12 (A) /HPF      WBC, UA Too Numerous to Count (A) /HPF      Bacteria, UA 4+ (A) /HPF      Squamous Epithelial Cells, UA 3-6 (A) /HPF      Hyaline Casts, UA 3-6 /LPF      Methodology Automated Microscopy    Troponin [[id number]]  (Normal) Collected:  07/27/17 1854    Specimen:  Blood Updated:  07/27/17 1924     Troponin I 0.014 ng/mL           Objective   Physical Exam   Constitutional: She is oriented to person, place, and time. She appears well-developed and well-nourished.   HENT:   Head: Normocephalic and atraumatic.   Eyes: Conjunctivae and EOM are normal. Pupils are equal, round, and reactive to light.   Neck: Normal range of motion. Neck supple.   Cardiovascular: Normal rate, regular rhythm, normal heart sounds and intact distal pulses.    Pulmonary/Chest: Effort normal and breath sounds normal.   Abdominal: Soft. Bowel sounds are normal.   Neurological: She is alert and oriented to person, place, and time.   Skin: Skin is warm and dry.   Psychiatric: She has a normal mood and affect. Her behavior is normal.   Nursing note and vitals reviewed.      Procedures         XR Chest 1 View   Final Result   Impression:       Borderline size of the cardiomediastinal silhouette, without convincing   evidence of fluid overload.       This report was finalized on 07/27/2017 16:03 by Dr. Sarina Pompa MD.          /71  Pulse 87  Temp 97 °F (36.1 °C)  Resp 20  Ht 63\" (160 cm)  Wt 160 lb (72.6 kg)  SpO2 100%  BMI 28.34 kg/m2    ED Course    ED Course       Medications - " No data to display    HEART Score  History: Slightly suspicious (+0)  ECG: Non specific repolarization disturbance (+1)  Age: 45 through 65 (+1)  Risk Factors: 1 - 2 risk factors (+1)  Troponin: Normal limit or lower (+0)  Total: 3        MDM  Number of Diagnoses or Management Options  Chest pain, unspecified type: new and requires workup  Cystitis: new and requires workup     Amount and/or Complexity of Data Reviewed  Clinical lab tests: ordered and reviewed  Tests in the radiology section of CPT®: ordered and reviewed  Tests in the medicine section of CPT®: reviewed and ordered  Decide to obtain previous medical records or to obtain history from someone other than the patient: yes  Review and summarize past medical records: yes  Independent visualization of images, tracings, or specimens: yes    Risk of Complications, Morbidity, and/or Mortality  Presenting problems: moderate  Diagnostic procedures: moderate  Management options: moderate  General comments: Plan a stress test within the last couple months which was negative.  Given this and the outcome for laboratory studies which were essentially showed no acute disease.  Patient be treated outpatient basis for her urinary tract infection she has been encouraged to follow-up with Dr. Pappas as soon as possible and to refrain from any strenuous activity until she is cleared by him.  In addition patient counseled to return if symptoms change or worsen.  I have attempted to keep patient here forced third set of enzymes however the patient states that she really needs to go she is afraid it will rain andd and she wants to roll the windows up in her car in addition she would like to smoke.    Patient Progress  Patient progress: improved      Final diagnoses:   Chest pain, unspecified type   Cystitis          Joanne Granger,   07/27/17 1958

## 2017-07-28 NOTE — DISCHARGE INSTRUCTIONS

## 2017-07-28 NOTE — TELEPHONE ENCOUNTER
Pt called and stated that she has been in the er all night with chest pains. Her ekgs all came back normal, but still having chest pains. I offered to make the pt an appointment if needed. She feels strongly that she needs to speak with mendez about her situation. I transferred her to [facility] to discus her pains.     Nyla

## 2017-07-28 NOTE — TELEPHONE ENCOUNTER
I called pt regarding the message on my vm that she was in the ER last night and was still having some chest pain and was having to take aspirin 325 mg and has taken several of these today.  Pt had left the ER without medical advise before they could finish the evaluation and 3rd set of enzymes.  I spoke with the pt and she wanted to talk to Mora today.  Mora is off today so I took this to Tawny and she called the pt and spoke with her.  The pt wanted to know her corus DNA results of which we do not have back yet, before she would take any new medication.  We told pt we will find out what the results are and let her know as soon as we can.  She still wants Mora to call her.  We told her it would be Monday before she can call her back.  She stated understanding.  Also she was informed to only take 81 mg aspirin once a day or she can cut the 325 mg in fourths.  And to go back to the ER if her symptoms persist so they can finish the test.  She stated understanding.  Delio Beavers, CMA

## 2017-07-31 ENCOUNTER — TELEPHONE (OUTPATIENT)
Dept: CARDIOLOGY | Facility: CLINIC | Age: 62
End: 2017-07-31

## 2017-07-31 NOTE — TELEPHONE ENCOUNTER
Discussed Corus DNA score with Delio Beavers MA and Jessie Min- pt score is 10-  Low risk/4% likelihood of obstructive CAD. Called the patient and notified her of this result. She voices understanding. She states she has an appointment for an endoscopy next month and will also follow up with her PCP regarding other possible causes of her chest pain. Reviewed symptoms to watch for and when to report to ER. She voices understanding.

## 2017-08-01 DIAGNOSIS — R07.9 CHEST PAIN, UNSPECIFIED TYPE: Primary | ICD-10-CM

## 2017-08-14 ENCOUNTER — TELEPHONE (OUTPATIENT)
Dept: CARDIOLOGY | Facility: CLINIC | Age: 62
End: 2017-08-14

## 2017-08-14 NOTE — TELEPHONE ENCOUNTER
----- Message from SIMONA Davalos sent at 8/14/2017  3:38 PM CDT -----  Please call patient and see how she is doing. She does not need to be seen unless she is having issues and wants to be seen. Her Corus testing was normal.              PATIENT HAS BEEN FEELING THE SAME SHE THINKS IT IS HER NERVES. SHE SEES DR MEDELLIN AT THE END OF THE MONTH AND WILL ADDRESS WITH HIM. IF SHE HAS PROBLEMS THAT GETS WORSE SHE WILL CALL AND GET BACK ON NP SCHEDULE.

## 2017-09-28 ENCOUNTER — OFFICE VISIT (OUTPATIENT)
Dept: ENDOCRINOLOGY | Facility: CLINIC | Age: 62
End: 2017-09-28

## 2017-09-28 DIAGNOSIS — E11.42 DIABETIC POLYNEUROPATHY ASSOCIATED WITH TYPE 2 DIABETES MELLITUS (HCC): ICD-10-CM

## 2017-09-28 DIAGNOSIS — E11.69 MIXED DIABETIC HYPERLIPIDEMIA ASSOCIATED WITH TYPE 2 DIABETES MELLITUS (HCC): ICD-10-CM

## 2017-09-28 DIAGNOSIS — E78.2 MIXED DIABETIC HYPERLIPIDEMIA ASSOCIATED WITH TYPE 2 DIABETES MELLITUS (HCC): ICD-10-CM

## 2017-09-28 DIAGNOSIS — E11.65 TYPE 2 DIABETES MELLITUS WITH HYPERGLYCEMIA, WITH LONG-TERM CURRENT USE OF INSULIN (HCC): Primary | ICD-10-CM

## 2017-09-28 DIAGNOSIS — E11.59 HYPERTENSION ASSOCIATED WITH DIABETES (HCC): ICD-10-CM

## 2017-09-28 DIAGNOSIS — Z79.4 TYPE 2 DIABETES MELLITUS WITH HYPERGLYCEMIA, WITH LONG-TERM CURRENT USE OF INSULIN (HCC): Primary | ICD-10-CM

## 2017-09-28 DIAGNOSIS — I15.2 HYPERTENSION ASSOCIATED WITH DIABETES (HCC): ICD-10-CM

## 2017-09-28 PROCEDURE — PTNOCHG PR CUSTOM PT NO CHARGE VISIT: Performed by: INTERNAL MEDICINE

## 2017-09-28 PROCEDURE — 95250 CONT GLUC MNTR PHYS/QHP EQP: CPT | Performed by: INTERNAL MEDICINE

## 2017-09-28 PROCEDURE — 99204 OFFICE O/P NEW MOD 45 MIN: CPT | Performed by: INTERNAL MEDICINE

## 2017-09-28 PROCEDURE — 99406 BEHAV CHNG SMOKING 3-10 MIN: CPT | Performed by: INTERNAL MEDICINE

## 2017-09-28 RX ORDER — LANCING DEVICE
EACH MISCELLANEOUS
Qty: 1 EACH | Refills: 1 | Status: SHIPPED | OUTPATIENT
Start: 2017-09-28

## 2017-09-28 RX ORDER — GABAPENTIN 600 MG/1
900 TABLET ORAL 3 TIMES DAILY
Qty: 135 TABLET | Refills: 5 | Status: SHIPPED | OUTPATIENT
Start: 2017-09-28 | End: 2018-09-28

## 2017-09-28 RX ORDER — FLUCONAZOLE 150 MG/1
150 TABLET ORAL WEEKLY
Qty: 4 TABLET | Refills: 6 | Status: SHIPPED | OUTPATIENT
Start: 2017-09-28 | End: 2021-03-08

## 2017-09-28 RX ORDER — GLUCOSAMINE HCL/CHONDROITIN SU 500-400 MG
CAPSULE ORAL
Qty: 120 EACH | Refills: 11 | Status: SHIPPED | OUTPATIENT
Start: 2017-09-28

## 2017-10-03 ENCOUNTER — TELEPHONE (OUTPATIENT)
Dept: ENDOCRINOLOGY | Facility: CLINIC | Age: 62
End: 2017-10-03

## 2017-10-03 NOTE — TELEPHONE ENCOUNTER
Status   Sent to [facility]   DrugHumaLOG KwikPen 200UNIT/ML pen-injectors   [employer] Electronic PA Form      Status   Sent to [facility]   Carlos XR 5-1000MG er tablets   [employer] Electronic PA Form

## 2017-10-06 ENCOUNTER — TELEPHONE (OUTPATIENT)
Dept: FAMILY MEDICINE CLINIC | Facility: CLINIC | Age: 62
End: 2017-10-06

## 2017-10-06 ENCOUNTER — TELEPHONE (OUTPATIENT)
Dept: ENDOCRINOLOGY | Facility: CLINIC | Age: 62
End: 2017-10-06

## 2017-10-06 NOTE — TELEPHONE ENCOUNTER
YANELY MCCOY LEFT MESSAGE NEEDING TO KNOW IF SHE IS TO USE THE NOVALOG THE SAME AS THE HUMALOG? ALSO WHEN IS NEXT APPT IS..PLEASE CALL HER BACK

## 2017-10-17 ENCOUNTER — OFFICE VISIT (OUTPATIENT)
Dept: ENDOCRINOLOGY | Facility: CLINIC | Age: 62
End: 2017-10-17

## 2017-10-17 VITALS
WEIGHT: 173 LBS | BODY MASS INDEX: 30.65 KG/M2 | SYSTOLIC BLOOD PRESSURE: 124 MMHG | DIASTOLIC BLOOD PRESSURE: 78 MMHG | HEIGHT: 63 IN | HEART RATE: 92 BPM

## 2017-10-17 DIAGNOSIS — E11.59 HYPERTENSION ASSOCIATED WITH DIABETES (HCC): ICD-10-CM

## 2017-10-17 DIAGNOSIS — Z72.0 TOBACCO USE: ICD-10-CM

## 2017-10-17 DIAGNOSIS — E11.42 DIABETIC POLYNEUROPATHY ASSOCIATED WITH TYPE 2 DIABETES MELLITUS (HCC): ICD-10-CM

## 2017-10-17 DIAGNOSIS — Z79.4 TYPE 2 DIABETES MELLITUS WITH HYPERGLYCEMIA, WITH LONG-TERM CURRENT USE OF INSULIN (HCC): Primary | ICD-10-CM

## 2017-10-17 DIAGNOSIS — E11.69 MIXED DIABETIC HYPERLIPIDEMIA ASSOCIATED WITH TYPE 2 DIABETES MELLITUS (HCC): ICD-10-CM

## 2017-10-17 DIAGNOSIS — E78.2 MIXED DIABETIC HYPERLIPIDEMIA ASSOCIATED WITH TYPE 2 DIABETES MELLITUS (HCC): ICD-10-CM

## 2017-10-17 DIAGNOSIS — E11.65 TYPE 2 DIABETES MELLITUS WITH HYPERGLYCEMIA, WITH LONG-TERM CURRENT USE OF INSULIN (HCC): Primary | ICD-10-CM

## 2017-10-17 DIAGNOSIS — I15.2 HYPERTENSION ASSOCIATED WITH DIABETES (HCC): ICD-10-CM

## 2017-10-17 PROCEDURE — PTNOCHG PR CUSTOM PT NO CHARGE VISIT: Performed by: INTERNAL MEDICINE

## 2017-10-17 PROCEDURE — 99214 OFFICE O/P EST MOD 30 MIN: CPT | Performed by: NURSE PRACTITIONER

## 2017-10-17 PROCEDURE — 99406 BEHAV CHNG SMOKING 3-10 MIN: CPT | Performed by: NURSE PRACTITIONER

## 2017-10-17 NOTE — PROGRESS NOTES
Yolanda Jacobs is a 62 y.o. female seen by diabetes educator 10/17/2017 to discuss insulin pump options. Discussed Tandem and Medtronic insulin pumps. Provided patient with brochures and she is going to review the information and let me know her decision at her follow up appointment in 2 months.     Belén Puga MS, RD, LD, CDE

## 2017-10-17 NOTE — PROGRESS NOTES
Subjective    Yolanda Jacobs is a 62 y.o. female. she is here today for follow-up.    History of Present Illness       Primary Care Provider     Job Palomo MD     Duration 10 years     Timing - Diabetes is Constant     Quality -  poorly controlled     Severity -  severe     Complications - peripheral neuropathy     Current symptoms/problems  paresthesia of the feet      Alleviating Factors: Compliance       Side Effects  none     Current diet  high carb     Current exercise none     Current monitoring regimen: home blood tests - 3 times daily  Home blood sugar records:     Highest reading 165    Lowest was 73   No longer 300 up to 400        Hypoglycemia none         The following portions of the patient's history were reviewed and updated as appropriate:   Past Medical History:   Diagnosis Date   • COPD (chronic obstructive pulmonary disease)    • Diabetes mellitus    • HTN (hypertension)    • Hyperlipidemia      Past Surgical History:   Procedure Laterality Date   • APPENDECTOMY     • CHOLECYSTECTOMY     • HYSTERECTOMY     • KNEE SURGERY       Family History   Problem Relation Age of Onset   • Diabetes Mother    • Cancer Mother    • Lung cancer Father    • No Known Problems Sister    • Cancer Brother      OB History     No data available        Current Outpatient Prescriptions   Medication Sig Dispense Refill   • albuterol (ACCUNEB) 0.63 MG/3ML nebulizer solution Take 1 ampule by nebulization every 6 hours as needed for Wheezing     • Alcohol Swabs (ALCOHOL WIPES) pads Use 4 x daily 120 each 11   • ALPRAZolam (XANAX) 1 MG tablet Take 1 mg by mouth 3 (Three) Times a Day As Needed for Anxiety.     • amitriptyline (ELAVIL) 10 MG tablet Take 10 mg by mouth Every Night.     • aspirin 81 MG tablet Take 1 tablet by mouth Daily. 30 tablet 11   • atorvastatin (LIPITOR) 40 MG tablet Take 40 mg by mouth Daily.     • Blood Glucose Monitoring Suppl w/Device kit USE AS INDICATED, ANY MONITOR 1 each 1   •  cyclobenzaprine (FLEXERIL) 10 MG tablet Take 10 mg by mouth 3 (Three) Times a Day As Needed for Muscle Spasms.     • diltiaZEM CD (CARDIZEM CD) 180 MG 24 hr capsule Take 180 mg by mouth Daily.     • Dulaglutide 0.75 MG/0.5ML solution pen-injector Inject 0.75 mg under the skin 1 (One) Time Per Week. 4 pen 11   • Empagliflozin-Metformin HCl ER (SYNJARDY XR) 5-1000 MG tablet sustained-release 24 hour Take 2 tablet/day by mouth Daily With Breakfast. 60 tablet 11   • esomeprazole (nexIUM) 40 MG capsule Take 40 mg by mouth Every Morning Before Breakfast.     • fluconazole (DIFLUCAN) 150 MG tablet Take 1 tablet by mouth 1 (One) Time Per Week. 4 tablet 6   • fluticasone-salmeterol (ADVAIR) 250-50 MCG/DOSE DISKUS Inhale 1 puff 2 (Two) Times a Day.     • furosemide (LASIX) 40 MG tablet Take 40 mg by mouth Daily.     • gabapentin (NEURONTIN) 600 MG tablet Take 1.5 tablets by mouth 3 (Three) Times a Day. 135 tablet 5   • Glucose Blood (BLOOD GLUCOSE TEST) strip Use 4 x daily, use any brand covered by insurance or same brand as before 120 each 11   • HYDROcodone-acetaminophen (NORCO) 7.5-325 MG per tablet Take 1 tablet by mouth Every 4 (Four) Hours As Needed for Moderate Pain (4-6).     • Insulin Degludec (TRESIBA FLEXTOUCH) 200 UNIT/ML solution pen-injector Inject 100 Units under the skin Every Night. Up to 150 units daily 8 pen 11   • insulin detemir (LEVEMIR) 100 UNIT/ML injection Inject 70 Units under the skin Every Morning.     • insulin detemir (LEVEMIR) 100 UNIT/ML injection Inject 86 Units under the skin Every Evening.     • Insulin Lispro (HUMALOG KWIKPEN) 200 UNIT/ML solution pen-injector Inject 40 Units under the skin 3 (Three) Times a Day With Meals. 6 pen 11   • Insulin Pen Needle (B-D UF III MINI PEN NEEDLES) 31G X 5 MM misc Use 4 times daily 120 each 11   • Lancet Devices (LANCING DEVICE) misc USE AS INDICATED TO CORRELATE WITH STRIPS AND METER 1 each 1   • Lancets 30G misc USE 4 X DAILY 120 each 11   •  levothyroxine (SYNTHROID, LEVOTHROID) 25 MCG tablet Take 25 mcg by mouth Daily.     • lisinopril (PRINIVIL,ZESTRIL) 20 MG tablet Take 20 mg by mouth Daily.     • loratadine (CLARITIN) 10 MG tablet Take 10 mg by mouth Daily.     • sulfamethoxazole-trimethoprim (BACTRIM,SEPTRA) 400-80 MG tablet Take 1 tablet by mouth 2 (Two) Times a Day. 20 tablet 0   • varenicline (CHANTIX) 0.5 MG tablet Take 0.5 mg by mouth 2 (Two) Times a Day.       No current facility-administered medications for this visit.      Allergies   Allergen Reactions   • Ceftin [Cefuroxime Axetil]    • Iodinated Diagnostic Agents      Social History     Social History   • Marital status: Legally      Spouse name: N/A   • Number of children: N/A   • Years of education: N/A     Social History Main Topics   • Smoking status: Current Every Day Smoker     Packs/day: 0.50   • Smokeless tobacco: Never Used   • Alcohol use No   • Drug use: No   • Sexual activity: Defer     Other Topics Concern   • None     Social History Narrative       Review of Systems  Review of Systems   Constitutional: Negative for activity change, appetite change, diaphoresis and fatigue.   HENT: Negative for congestion, dental problem, drooling, facial swelling, sneezing, sore throat, tinnitus, trouble swallowing and voice change.    Eyes: Negative for photophobia, pain, discharge, redness, itching and visual disturbance.   Respiratory: Negative for apnea, cough, choking, chest tightness and shortness of breath.    Cardiovascular: Negative for chest pain, palpitations and leg swelling.   Gastrointestinal: Negative for abdominal distention, abdominal pain, constipation, diarrhea, nausea and vomiting.   Endocrine: Negative for cold intolerance, heat intolerance, polydipsia, polyphagia and polyuria.   Genitourinary: Negative for difficulty urinating, dysuria, frequency, hematuria and urgency.   Musculoskeletal: Negative for arthralgias, back pain, gait problem, joint swelling,  "myalgias, neck pain and neck stiffness.   Skin: Negative for color change, pallor, rash and wound.   Allergic/Immunologic: Negative for environmental allergies, food allergies and immunocompromised state.   Neurological: Negative for dizziness, tremors, facial asymmetry, weakness, light-headedness, numbness and headaches.   Hematological: Negative for adenopathy. Does not bruise/bleed easily.   Psychiatric/Behavioral: Negative for agitation, behavioral problems, confusion and sleep disturbance.        Objective    /78 (BP Location: Right arm, Patient Position: Sitting, Cuff Size: Adult)  Pulse 92  Ht 63\" (160 cm)  Wt 173 lb (78.5 kg)  BMI 30.65 kg/m2  Physical Exam   Constitutional: She is oriented to person, place, and time. She appears well-developed and well-nourished. No distress.   HENT:   Head: Normocephalic and atraumatic.   Right Ear: External ear normal.   Left Ear: External ear normal.   Nose: Nose normal.   Eyes: Conjunctivae and EOM are normal. Pupils are equal, round, and reactive to light.   Neck: Normal range of motion. Neck supple. No tracheal deviation present. No thyromegaly present.   Cardiovascular: Normal rate, regular rhythm and normal heart sounds.    No murmur heard.  Pulmonary/Chest: Effort normal and breath sounds normal. No respiratory distress. She has no wheezes.   Abdominal: Soft. Bowel sounds are normal. There is no tenderness. There is no rebound and no guarding.   Musculoskeletal: Normal range of motion. She exhibits no edema, tenderness or deformity.   Neurological: She is alert and oriented to person, place, and time. No cranial nerve deficit.   Skin: Skin is warm and dry. No rash noted.   Psychiatric: She has a normal mood and affect. Her behavior is normal. Judgment and thought content normal.       Lab Review  Glucose (mg/dL)   Date Value   07/27/2017 360 (H)     Sodium (mmol/L)   Date Value   07/27/2017 134 (L)     Potassium (mmol/L)   Date Value   07/27/2017 4.1 "     Chloride (mmol/L)   Date Value   07/27/2017 101     CO2 (mmol/L)   Date Value   07/27/2017 23.0 (L)     BUN (mg/dL)   Date Value   07/27/2017 15     Creatinine (mg/dL)   Date Value   07/27/2017 0.88       Assessment/Plan      1. Type 2 diabetes mellitus with hyperglycemia, with long-term current use of insulin    2. Diabetic polyneuropathy associated with type 2 diabetes mellitus    3. Hypertension associated with diabetes    4. Mixed diabetic hyperlipidemia associated with type 2 diabetes mellitus    5. Tobacco use    .    Medications prescribed:  Outpatient Encounter Prescriptions as of 10/17/2017   Medication Sig Dispense Refill   • albuterol (ACCUNEB) 0.63 MG/3ML nebulizer solution Take 1 ampule by nebulization every 6 hours as needed for Wheezing     • Alcohol Swabs (ALCOHOL WIPES) pads Use 4 x daily 120 each 11   • ALPRAZolam (XANAX) 1 MG tablet Take 1 mg by mouth 3 (Three) Times a Day As Needed for Anxiety.     • amitriptyline (ELAVIL) 10 MG tablet Take 10 mg by mouth Every Night.     • aspirin 81 MG tablet Take 1 tablet by mouth Daily. 30 tablet 11   • atorvastatin (LIPITOR) 40 MG tablet Take 40 mg by mouth Daily.     • Blood Glucose Monitoring Suppl w/Device kit USE AS INDICATED, ANY MONITOR 1 each 1   • cyclobenzaprine (FLEXERIL) 10 MG tablet Take 10 mg by mouth 3 (Three) Times a Day As Needed for Muscle Spasms.     • diltiaZEM CD (CARDIZEM CD) 180 MG 24 hr capsule Take 180 mg by mouth Daily.     • Dulaglutide 0.75 MG/0.5ML solution pen-injector Inject 0.75 mg under the skin 1 (One) Time Per Week. 4 pen 11   • Empagliflozin-Metformin HCl ER (SYNJARDY XR) 5-1000 MG tablet sustained-release 24 hour Take 2 tablet/day by mouth Daily With Breakfast. 60 tablet 11   • esomeprazole (nexIUM) 40 MG capsule Take 40 mg by mouth Every Morning Before Breakfast.     • fluconazole (DIFLUCAN) 150 MG tablet Take 1 tablet by mouth 1 (One) Time Per Week. 4 tablet 6   • fluticasone-salmeterol (ADVAIR) 250-50 MCG/DOSE  DISKUS Inhale 1 puff 2 (Two) Times a Day.     • furosemide (LASIX) 40 MG tablet Take 40 mg by mouth Daily.     • gabapentin (NEURONTIN) 600 MG tablet Take 1.5 tablets by mouth 3 (Three) Times a Day. 135 tablet 5   • Glucose Blood (BLOOD GLUCOSE TEST) strip Use 4 x daily, use any brand covered by insurance or same brand as before 120 each 11   • HYDROcodone-acetaminophen (NORCO) 7.5-325 MG per tablet Take 1 tablet by mouth Every 4 (Four) Hours As Needed for Moderate Pain (4-6).     • Insulin Degludec (TRESIBA FLEXTOUCH) 200 UNIT/ML solution pen-injector Inject 100 Units under the skin Every Night. Up to 150 units daily 8 pen 11   • insulin detemir (LEVEMIR) 100 UNIT/ML injection Inject 70 Units under the skin Every Morning.     • insulin detemir (LEVEMIR) 100 UNIT/ML injection Inject 86 Units under the skin Every Evening.     • Insulin Lispro (HUMALOG KWIKPEN) 200 UNIT/ML solution pen-injector Inject 40 Units under the skin 3 (Three) Times a Day With Meals. 6 pen 11   • Insulin Pen Needle (B-D UF III MINI PEN NEEDLES) 31G X 5 MM misc Use 4 times daily 120 each 11   • Lancet Devices (LANCING DEVICE) misc USE AS INDICATED TO CORRELATE WITH STRIPS AND METER 1 each 1   • Lancets 30G misc USE 4 X DAILY 120 each 11   • levothyroxine (SYNTHROID, LEVOTHROID) 25 MCG tablet Take 25 mcg by mouth Daily.     • lisinopril (PRINIVIL,ZESTRIL) 20 MG tablet Take 20 mg by mouth Daily.     • loratadine (CLARITIN) 10 MG tablet Take 10 mg by mouth Daily.     • sulfamethoxazole-trimethoprim (BACTRIM,SEPTRA) 400-80 MG tablet Take 1 tablet by mouth 2 (Two) Times a Day. 20 tablet 0   • varenicline (CHANTIX) 0.5 MG tablet Take 0.5 mg by mouth 2 (Two) Times a Day.       No facility-administered encounter medications on file as of 10/17/2017.        Orders placed during this encounter include:  No orders of the defined types were placed in this encounter.        Glycemic Management      Metformin 1 gram once daily with lunch   Having frequent  GMI - I will still add jardiance and combine with metformin      Start synjardy xr5/1000, 2 tabs w bkfast and add diflucan 150 mg weekly         trulicity 0.75 mg weekly - taking           Tresiba -- taking 80 units      apply 50 point rule        Novolog doing 4 units per 15 grams of CHO     + sliding scale     3 per 50             Lipid Management       On lipitor 40 mg qhs      Blood Pressure Management:            bp controlled on ace I regimen            Microvascular Complication Monitoring:       Eye Exam Evaluation, within 1 year      -----------     Last Microalbumin-Proteinuria Assessment     No results found for: MALBCRERATIO     No results found for: UTPCR     -----------        Neuropathy, yes . On neurontin 300 mg tid, may increase up to 900 mg tid         Immunizations:             Preventive Care:       I advised the patient of the risks in continuing to use tobacco, and I provided this patient with smoking cessation educational materials.  I also discussed how to quit smoking and the patient has expressed the willingness to quit.       During this visit, I spent 3-10 mintues counseling the patient regarding smoking cessation.               Weight Related:       Wt Readings from Last 3 Encounters:   07/27/17 160 lb (72.6 kg)   07/17/17 167 lb (75.8 kg)   06/26/17 168 lb (76.2 kg)      There is no height or weight on file to calculate BMI.           Diet interventions: moderate (500 kCal/d) deficit diet.        Bone Health           Lab Results   Component Value Date     CALCIUM 9.6 07/27/2017         Thyroid Health     No results found for: TSH                 Other Diabetes Related Aspects         No results found for: SIMGDHDC23              4. Follow-up: Return in about 8 weeks (around 12/12/2017).

## 2017-11-06 ENCOUNTER — TELEPHONE (OUTPATIENT)
Dept: PRIMARY CARE CLINIC | Age: 62
End: 2017-11-06

## 2017-11-06 ENCOUNTER — OFFICE VISIT (OUTPATIENT)
Dept: PRIMARY CARE CLINIC | Age: 62
End: 2017-11-06
Payer: MEDICARE

## 2017-11-06 VITALS
OXYGEN SATURATION: 98 % | DIASTOLIC BLOOD PRESSURE: 78 MMHG | HEIGHT: 63 IN | TEMPERATURE: 97.6 F | WEIGHT: 174.6 LBS | HEART RATE: 89 BPM | SYSTOLIC BLOOD PRESSURE: 120 MMHG | BODY MASS INDEX: 30.94 KG/M2

## 2017-11-06 DIAGNOSIS — Z79.4 TYPE 2 DIABETES MELLITUS WITH MICROALBUMINURIA, WITH LONG-TERM CURRENT USE OF INSULIN (HCC): ICD-10-CM

## 2017-11-06 DIAGNOSIS — G89.4 CHRONIC PAIN SYNDROME: ICD-10-CM

## 2017-11-06 DIAGNOSIS — I73.9 PVD (PERIPHERAL VASCULAR DISEASE) (HCC): Primary | ICD-10-CM

## 2017-11-06 DIAGNOSIS — E11.29 TYPE 2 DIABETES MELLITUS WITH MICROALBUMINURIA, WITH LONG-TERM CURRENT USE OF INSULIN (HCC): ICD-10-CM

## 2017-11-06 DIAGNOSIS — E78.00 HYPERCHOLESTEROLEMIA: ICD-10-CM

## 2017-11-06 DIAGNOSIS — R80.9 TYPE 2 DIABETES MELLITUS WITH MICROALBUMINURIA, WITH LONG-TERM CURRENT USE OF INSULIN (HCC): ICD-10-CM

## 2017-11-06 PROCEDURE — G0008 ADMIN INFLUENZA VIRUS VAC: HCPCS | Performed by: INTERNAL MEDICINE

## 2017-11-06 PROCEDURE — 3017F COLORECTAL CA SCREEN DOC REV: CPT | Performed by: INTERNAL MEDICINE

## 2017-11-06 PROCEDURE — G8427 DOCREV CUR MEDS BY ELIG CLIN: HCPCS | Performed by: INTERNAL MEDICINE

## 2017-11-06 PROCEDURE — 3014F SCREEN MAMMO DOC REV: CPT | Performed by: INTERNAL MEDICINE

## 2017-11-06 PROCEDURE — 90688 IIV4 VACCINE SPLT 0.5 ML IM: CPT | Performed by: INTERNAL MEDICINE

## 2017-11-06 PROCEDURE — 3046F HEMOGLOBIN A1C LEVEL >9.0%: CPT | Performed by: INTERNAL MEDICINE

## 2017-11-06 PROCEDURE — G8484 FLU IMMUNIZE NO ADMIN: HCPCS | Performed by: INTERNAL MEDICINE

## 2017-11-06 PROCEDURE — 4004F PT TOBACCO SCREEN RCVD TLK: CPT | Performed by: INTERNAL MEDICINE

## 2017-11-06 PROCEDURE — G8417 CALC BMI ABV UP PARAM F/U: HCPCS | Performed by: INTERNAL MEDICINE

## 2017-11-06 PROCEDURE — 99214 OFFICE O/P EST MOD 30 MIN: CPT | Performed by: INTERNAL MEDICINE

## 2017-11-06 RX ORDER — ALPRAZOLAM 0.5 MG/1
0.5 TABLET ORAL EVERY 6 HOURS PRN
Qty: 90 TABLET | Refills: 1 | Status: SHIPPED | OUTPATIENT
Start: 2017-11-06 | End: 2017-12-18 | Stop reason: SDUPTHER

## 2017-11-06 RX ORDER — HYDROCODONE BITARTRATE AND ACETAMINOPHEN 7.5; 325 MG/1; MG/1
1 TABLET ORAL 4 TIMES DAILY
Qty: 140 TABLET | Refills: 0 | Status: SHIPPED | OUTPATIENT
Start: 2017-11-06 | End: 2017-12-18 | Stop reason: SDUPTHER

## 2017-11-06 ASSESSMENT — ENCOUNTER SYMPTOMS
DIARRHEA: 0
CONSTIPATION: 0
NAUSEA: 0
SHORTNESS OF BREATH: 1
VOMITING: 0
COUGH: 1
BACK PAIN: 1

## 2017-11-06 NOTE — PROGRESS NOTES
[facility]  [address]  [address] [place] [id number]  Dept: [phone]  Dept Fax: [phone]  Loc: [phone]    Joann Tolentino is a 63 y.o. female who presents today for her medical conditions/complaints as noted below. Joann Tolentino is c/o of   Chief Complaint   Patient presents with    Establish Care    Diabetes    Hyperlipidemia         HPI:     HPI  Ms. Ramirez comes in to establish primary care. She has a history of long-standing type 2 diabetes mellitus, hypertension, diabetic nephropathy, chronic pain syndrome. She was previously followed by Dr. Rylee Cavanaugh.  He was injured recently, and she wishes to establish primary care. She does take 150 Norco a day. I did explain to her our policy of weaning our patients down to a maximum number of 90 tablets per day. This is our new policy for prescribing practices for opioids. She wasn't happy with this. I explained that if this is unacceptable, then we will refer her to pain management. However, she refuses this because she doesn't want \"shots\". She is being followed by an endocrinologist in [facility]. She has follow-up next month for her hemoglobin A1c. No results found for: LABA1C          ( goal A1C is < 7)   No results found for: LABMICR  No results found for: LDLCHOLESTEROL, LDLCALC    (goal LDL is <100)   AST (U/L)   Date Value   04/29/2016 29     ALT (U/L)   Date Value   04/29/2016 38 (H)     BUN (mg/dL)   Date Value   04/29/2016 13     BP Readings from Last 3 Encounters:   11/06/17 120/78   03/16/17 130/74   04/29/16 162/79          (goal 120/80)    Past Medical History:   Diagnosis Date    Anxiety     Arthritis     Bilateral cataracts     Chronic kidney disease     [facility]     Diabetes mellitus [facility]     Diabetes mellitus [facility]     Hyperlipidemia     Cholesterol management per pcp.      Hypertension     IBS (irritable bowel syndrome)     Pericardial effusion     Peripheral vascular disease (HCC)     Smoker       Past Surgical History:   Procedure Laterality Date    APPENDECTOMY      CHOLECYSTECTOMY      COLONOSCOPY      ENDOSCOPY, COLON, DIAGNOSTIC      FINGER SURGERY      HYSTERECTOMY, TOTAL ABDOMINAL         Family History   Problem Relation Age of Onset    Cancer Mother     Cancer Father     Heart Failure Other     Heart Failure Maternal Grandfather        Social History   Substance Use Topics    Smoking status: Current Every Day Smoker     Packs/day: 0.50     Types: Cigarettes    Smokeless tobacco: Never Used      Comment: up to a pack a day    Alcohol use No      Current Outpatient Prescriptions   Medication Sig Dispense Refill    Empagliflozin-Metformin HCl ER (SYNJARDY XR) 5-1000 MG TB24 Take 1 tablet by mouth daily      Insulin Degludec (TRESIBA FLEXTOUCH) 200 UNIT/ML SOPN Inject 100 Units into the skin nightly      insulin lispro (HUMALOG KWIKPEN) 200 UNIT/ML SOPN pen Inject 40 Units into the skin 3 times daily (with meals)      Dulaglutide 0.75 MG/0.5ML SOPN Inject 0.75 mg into the skin once a week      HYDROcodone-acetaminophen (NORCO) 7.5-325 MG per tablet Take 1 tablet by mouth 4 times daily  Up to 6 per pt. 140 tablet 0    ALPRAZolam (XANAX) 0.5 MG tablet Take 1 tablet by mouth every 6 hours as needed for Anxiety 90 tablet 1    triamcinolone (KENALOG) 0.1 % cream Apply topically 2 times daily Apply topically 2 times daily.       promethazine-dextromethorphan (PROMETHAZINE-DM) 6.25-15 MG/5ML syrup Take 5 mLs by mouth 4 times daily as needed for Cough      omeprazole (PRILOSEC) 20 MG capsule Take 20 mg by mouth daily      metFORMIN (GLUCOPHAGE) 1000 MG tablet Take 1,000 mg by mouth daily (with breakfast)      loratadine (CLARITIN) 10 MG tablet Take 10 mg by mouth daily      cyclobenzaprine (FLEXERIL) 10 MG tablet Take 10 mg by mouth 3 times daily as needed for Muscle spasms      dicyclomine (BENTYL) 10 MG capsule Take 10 mg by mouth 4 times daily (before meals and nightly)      amoxicillin-clavulanate (AUGMENTIN) 500-125 MG per tablet Take 1 tablet by mouth 3 times daily      amitriptyline (ELAVIL) 10 MG tablet Take 10 mg by mouth nightly      albuterol (ACCUNEB) 0.63 MG/3ML nebulizer solution Take 1 ampule by nebulization every 6 hours as needed for Wheezing      diltiazem (CARDIZEM CD) 180 MG ER capsule Take 180 mg by mouth daily      sucralfate (CARAFATE) 1 GM tablet Take 1 g by mouth 4 times daily      naproxen (NAPROSYN) 500 MG tablet Take 1 tablet by mouth 2 times daily 20 tablet 0    insulin detemir (LEVEMIR) 100 UNIT/ML injection vial Inject 60 Units into the skin daily Indications: 82 at night 80 units at night      atorvastatin (LIPITOR) 20 MG tablet Take 40 mg by mouth daily       furosemide (LASIX) 20 MG tablet Take 40 mg by mouth 2 times daily       citalopram (CELEXA) 20 MG tablet Take 40 mg by mouth daily       fluticasone-salmeterol (ADVAIR DISKUS) 250-50 MCG/DOSE AEPB Inhale 1 puff into the lungs every 12 hours.  gabapentin (NEURONTIN) 100 MG capsule Take 300 mg by mouth 3 times daily       lisinopril (PRINIVIL;ZESTRIL) 10 MG tablet   Take 20 mg by mouth daily       tiotropium (SPIRIVA HANDIHALER) 18 MCG inhalation capsule Inhale 18 mcg into the lungs daily. No current facility-administered medications for this visit.       Allergies   Allergen Reactions    Ceftin [Cefuroxime Axetil] Swelling    Codeine Nausea Only    Dye [Iodides] Swelling and Other (See Comments)     SOB       Health Maintenance   Topic Date Due    Hepatitis C screen  1955    Diabetic foot exam  06/27/1967    Diabetic hemoglobin A1C test  06/27/1967    Diabetic retinal exam  06/27/1967    Lipid screen  06/27/1967    HIV screen  06/27/1972    Diabetic microalbuminuria test  06/27/1975    DTaP/Tdap/Td vaccine (1 - Tdap) 06/27/1976    Pneumococcal med risk (1 of 1 - PPSV23) month to goal of 90. Follow up again in three months. Orders Placed This Encounter   Procedures    INFLUENZA, QUADV, 3 YRS AND OLDER, IM, MDV, 0.5ML (FLUZONE QUADV)     Orders Placed This Encounter   Medications    HYDROcodone-acetaminophen (NORCO) 7.5-325 MG per tablet     Sig: Take 1 tablet by mouth 4 times daily  Up to 6 per pt. Dispense:  140 tablet     Refill:  0    ALPRAZolam (XANAX) 0.5 MG tablet     Sig: Take 1 tablet by mouth every 6 hours as needed for Anxiety     Dispense:  90 tablet     Refill:  1       Patient given educational materials - see patient instructions. Discussed use, benefit, and side effects of prescribed medications. All patient questions answered. Pt voiced understanding. Reviewed health maintenance. Instructed to continue current medications, diet and exercise. Patient agreed with treatment plan. Follow up as directed.      Electronically signed by Terrence Calvin DO on 11/6/2017 at 4:19 PM

## 2017-11-06 NOTE — TELEPHONE ENCOUNTER
I saw this lady as a new patient today and she was arguing about her pain medications. I explained our policy. Please see my note. I for see this being a problem with not complying with our policy and a lot of phone calls and wasted energy. I sent in 140 tablets for her refill, if she calls and complains, then I suggest we discharge immediately, so we don't continue to build a relationship. She previously saw Dr. Yomaira Perez in [facility], and \"stopped\" seeing him because of \"discrepancies\". I have no energy for this.

## 2017-11-07 ENCOUNTER — TELEPHONE (OUTPATIENT)
Dept: PRIMARY CARE CLINIC | Age: 62
End: 2017-11-07

## 2017-11-17 ENCOUNTER — TELEPHONE (OUTPATIENT)
Dept: ENDOCRINOLOGY | Facility: CLINIC | Age: 62
End: 2017-11-17

## 2017-12-18 NOTE — TELEPHONE ENCOUNTER
Patient called today with request for refill on Sheldon and Xanax. Last office visit 11/06/17 with next scheduled appointment 02/06/18  Dose verified.    Last UDS  Scheduled for next visit

## 2017-12-20 RX ORDER — LORATADINE 10 MG/1
10 TABLET ORAL DAILY
Qty: 30 TABLET | Refills: 2 | Status: SHIPPED | OUTPATIENT
Start: 2017-12-20 | End: 2018-03-20 | Stop reason: SDUPTHER

## 2017-12-20 RX ORDER — CYCLOBENZAPRINE HCL 10 MG
10 TABLET ORAL 3 TIMES DAILY PRN
Qty: 90 TABLET | Refills: 0 | Status: SHIPPED | OUTPATIENT
Start: 2017-12-20 | End: 2018-01-19

## 2017-12-20 RX ORDER — ALPRAZOLAM 0.5 MG/1
0.5 TABLET ORAL EVERY 6 HOURS PRN
Qty: 90 TABLET | Refills: 1 | Status: SHIPPED | OUTPATIENT
Start: 2017-12-20 | End: 2018-02-21 | Stop reason: SDUPTHER

## 2017-12-20 RX ORDER — HYDROCODONE BITARTRATE AND ACETAMINOPHEN 7.5; 325 MG/1; MG/1
1 TABLET ORAL 4 TIMES DAILY
Qty: 130 TABLET | Refills: 0 | Status: SHIPPED | OUTPATIENT
Start: 2017-12-20 | End: 2018-01-18 | Stop reason: SDUPTHER

## 2018-01-18 RX ORDER — HYDROCODONE BITARTRATE AND ACETAMINOPHEN 7.5; 325 MG/1; MG/1
1 TABLET ORAL 4 TIMES DAILY
Qty: 120 TABLET | Refills: 0 | Status: SHIPPED | OUTPATIENT
Start: 2018-01-18 | End: 2018-02-17

## 2018-01-18 RX ORDER — LISINOPRIL 10 MG/1
20 TABLET ORAL DAILY
Qty: 30 TABLET | Refills: 5 | Status: SHIPPED | OUTPATIENT
Start: 2018-01-18 | End: 2018-10-09 | Stop reason: SDUPTHER

## 2018-01-18 RX ORDER — GABAPENTIN 100 MG/1
300 CAPSULE ORAL 3 TIMES DAILY
Qty: 90 CAPSULE | Refills: 5 | Status: SHIPPED | OUTPATIENT
Start: 2018-01-18 | End: 2018-02-21

## 2018-01-22 RX ORDER — ALBUTEROL SULFATE 0.63 MG/3ML
1 SOLUTION RESPIRATORY (INHALATION) EVERY 6 HOURS PRN
Qty: 270 ML | Refills: 5 | Status: SHIPPED | OUTPATIENT
Start: 2018-01-22 | End: 2022-01-18 | Stop reason: SDUPTHER

## 2018-01-22 RX ORDER — DILTIAZEM HYDROCHLORIDE 180 MG/1
180 CAPSULE, COATED, EXTENDED RELEASE ORAL DAILY
Qty: 30 CAPSULE | Refills: 5 | Status: SHIPPED | OUTPATIENT
Start: 2018-01-22 | End: 2018-08-10 | Stop reason: SDUPTHER

## 2018-01-22 RX ORDER — OMEPRAZOLE 20 MG/1
20 CAPSULE, DELAYED RELEASE ORAL DAILY
Qty: 30 CAPSULE | Refills: 5 | Status: SHIPPED | OUTPATIENT
Start: 2018-01-22 | End: 2018-08-10 | Stop reason: SDUPTHER

## 2018-01-22 RX ORDER — CITALOPRAM 20 MG/1
40 TABLET ORAL DAILY
Qty: 30 TABLET | Refills: 5 | Status: SHIPPED | OUTPATIENT
Start: 2018-01-22 | End: 2018-05-29

## 2018-01-22 RX ORDER — ATORVASTATIN CALCIUM 20 MG/1
40 TABLET, FILM COATED ORAL DAILY
Qty: 30 TABLET | Refills: 5 | Status: SHIPPED | OUTPATIENT
Start: 2018-01-22 | End: 2018-08-10 | Stop reason: SDUPTHER

## 2018-01-22 RX ORDER — SUCRALFATE 1 G/1
1 TABLET ORAL 4 TIMES DAILY
Qty: 120 TABLET | Refills: 5 | Status: SHIPPED | OUTPATIENT
Start: 2018-01-22 | End: 2018-11-06 | Stop reason: SDUPTHER

## 2018-01-22 RX ORDER — FUROSEMIDE 20 MG/1
40 TABLET ORAL 2 TIMES DAILY
Qty: 60 TABLET | Refills: 5 | Status: SHIPPED | OUTPATIENT
Start: 2018-01-22 | End: 2018-08-10 | Stop reason: SDUPTHER

## 2018-01-22 NOTE — TELEPHONE ENCOUNTER
Patient called and requested refills of the above medication to go to [facility]. E-Script sent.

## 2018-01-25 ENCOUNTER — OFFICE VISIT (OUTPATIENT)
Dept: ENDOCRINOLOGY | Facility: CLINIC | Age: 63
End: 2018-01-25

## 2018-01-25 VITALS
WEIGHT: 160 LBS | SYSTOLIC BLOOD PRESSURE: 138 MMHG | HEIGHT: 63 IN | BODY MASS INDEX: 28.35 KG/M2 | HEART RATE: 93 BPM | DIASTOLIC BLOOD PRESSURE: 80 MMHG

## 2018-01-25 DIAGNOSIS — E78.2 MIXED DIABETIC HYPERLIPIDEMIA ASSOCIATED WITH TYPE 2 DIABETES MELLITUS (HCC): ICD-10-CM

## 2018-01-25 DIAGNOSIS — E11.42 DIABETIC POLYNEUROPATHY ASSOCIATED WITH TYPE 2 DIABETES MELLITUS (HCC): ICD-10-CM

## 2018-01-25 DIAGNOSIS — E11.69 MIXED DIABETIC HYPERLIPIDEMIA ASSOCIATED WITH TYPE 2 DIABETES MELLITUS (HCC): ICD-10-CM

## 2018-01-25 DIAGNOSIS — E55.9 VITAMIN D DEFICIENCY: ICD-10-CM

## 2018-01-25 DIAGNOSIS — IMO0001 UNCONTROLLED TYPE 2 DIABETES MELLITUS WITHOUT COMPLICATION, WITHOUT LONG-TERM CURRENT USE OF INSULIN: Primary | ICD-10-CM

## 2018-01-25 DIAGNOSIS — E11.59 HYPERTENSION ASSOCIATED WITH DIABETES (HCC): ICD-10-CM

## 2018-01-25 DIAGNOSIS — Z72.0 TOBACCO USE: ICD-10-CM

## 2018-01-25 DIAGNOSIS — I15.2 HYPERTENSION ASSOCIATED WITH DIABETES (HCC): ICD-10-CM

## 2018-01-25 PROCEDURE — 99406 BEHAV CHNG SMOKING 3-10 MIN: CPT | Performed by: NURSE PRACTITIONER

## 2018-01-25 PROCEDURE — 99214 OFFICE O/P EST MOD 30 MIN: CPT | Performed by: NURSE PRACTITIONER

## 2018-01-25 RX ORDER — GABAPENTIN 400 MG/1
CAPSULE ORAL
Qty: 180 CAPSULE | Refills: 5 | Status: SHIPPED | OUTPATIENT
Start: 2018-01-25

## 2018-01-25 NOTE — PROGRESS NOTES
Subjective    Yolanda Jacobs is a 62 y.o. female. she is here today for follow-up.    History of Present Illness         Primary Care Provider     Job Palomo MD     Duration 10 years     Timing - Diabetes is Constant     Quality -  poorly controlled     Severity -  severe     Complications - peripheral neuropathy     Current symptoms/problems  paresthesia of the feet      Alleviating Factors: Compliance       Side Effects  none     Current diet  high carb     Current exercise none     Current monitoring regimen: home blood tests - 3 times daily  Home blood sugar records:      Running at goal --    States 107         Hypoglycemia none         The following portions of the patient's history were reviewed and updated as appropriate:   Past Medical History:   Diagnosis Date   • COPD (chronic obstructive pulmonary disease)    • Diabetes mellitus    • HTN (hypertension)    • Hyperlipidemia      Past Surgical History:   Procedure Laterality Date   • APPENDECTOMY     • CHOLECYSTECTOMY     • HYSTERECTOMY     • KNEE SURGERY       Family History   Problem Relation Age of Onset   • Diabetes Mother    • Cancer Mother    • Lung cancer Father    • No Known Problems Sister    • Cancer Brother      OB History     No data available        Current Outpatient Prescriptions   Medication Sig Dispense Refill   • albuterol (ACCUNEB) 0.63 MG/3ML nebulizer solution Take 1 ampule by nebulization every 6 hours as needed for Wheezing     • Alcohol Swabs (ALCOHOL WIPES) pads Use 4 x daily 120 each 11   • ALPRAZolam (XANAX) 1 MG tablet Take 1 mg by mouth 3 (Three) Times a Day As Needed for Anxiety.     • amitriptyline (ELAVIL) 10 MG tablet Take 10 mg by mouth Every Night.     • aspirin 81 MG tablet Take 1 tablet by mouth Daily. 30 tablet 11   • atorvastatin (LIPITOR) 40 MG tablet Take 40 mg by mouth Daily.     • Blood Glucose Monitoring Suppl w/Device kit USE AS INDICATED, ANY MONITOR 1 each 1   • cyclobenzaprine (FLEXERIL) 10 MG  tablet Take 10 mg by mouth 3 (Three) Times a Day As Needed for Muscle Spasms.     • diltiaZEM CD (CARDIZEM CD) 180 MG 24 hr capsule Take 180 mg by mouth Daily.     • Dulaglutide 0.75 MG/0.5ML solution pen-injector Inject 0.75 mg under the skin 1 (One) Time Per Week. 4 pen 11   • Empagliflozin-Metformin HCl ER (SYNJARDY XR) 5-1000 MG tablet sustained-release 24 hour Take 2 tablet/day by mouth Daily With Breakfast. 60 tablet 11   • esomeprazole (nexIUM) 40 MG capsule Take 40 mg by mouth Every Morning Before Breakfast.     • fluconazole (DIFLUCAN) 150 MG tablet Take 1 tablet by mouth 1 (One) Time Per Week. 4 tablet 6   • fluticasone-salmeterol (ADVAIR) 250-50 MCG/DOSE DISKUS Inhale 1 puff 2 (Two) Times a Day.     • furosemide (LASIX) 40 MG tablet Take 40 mg by mouth Daily.     • gabapentin (NEURONTIN) 400 MG capsule 2 capsules  capsule 5   • gabapentin (NEURONTIN) 600 MG tablet Take 1.5 tablets by mouth 3 (Three) Times a Day. 135 tablet 5   • Glucose Blood (BLOOD GLUCOSE TEST) strip Use 4 x daily, use any brand covered by insurance or same brand as before 120 each 11   • HYDROcodone-acetaminophen (NORCO) 7.5-325 MG per tablet Take 1 tablet by mouth Every 4 (Four) Hours As Needed for Moderate Pain (4-6).     • Insulin Degludec (TRESIBA FLEXTOUCH) 200 UNIT/ML solution pen-injector Inject 100 Units under the skin Every Night. Up to 150 units daily 8 pen 11   • insulin detemir (LEVEMIR) 100 UNIT/ML injection Inject 70 Units under the skin Every Morning.     • insulin detemir (LEVEMIR) 100 UNIT/ML injection Inject 86 Units under the skin Every Evening.     • Insulin Lispro (HUMALOG KWIKPEN) 200 UNIT/ML solution pen-injector Inject 40 Units under the skin 3 (Three) Times a Day With Meals. 6 pen 11   • Insulin Pen Needle (B-D UF III MINI PEN NEEDLES) 31G X 5 MM misc Use 4 times daily 120 each 11   • Lancet Devices (LANCING DEVICE) misc USE AS INDICATED TO CORRELATE WITH STRIPS AND METER 1 each 1   • Lancets 30G misc  USE 4 X DAILY 120 each 11   • levothyroxine (SYNTHROID, LEVOTHROID) 25 MCG tablet Take 25 mcg by mouth Daily.     • lisinopril (PRINIVIL,ZESTRIL) 20 MG tablet Take 20 mg by mouth Daily.     • loratadine (CLARITIN) 10 MG tablet Take 10 mg by mouth Daily.     • sulfamethoxazole-trimethoprim (BACTRIM,SEPTRA) 400-80 MG tablet Take 1 tablet by mouth 2 (Two) Times a Day. 20 tablet 0   • varenicline (CHANTIX) 0.5 MG tablet Take 0.5 mg by mouth 2 (Two) Times a Day.       No current facility-administered medications for this visit.      Allergies   Allergen Reactions   • Ceftin [Cefuroxime Axetil]    • Iodinated Diagnostic Agents      Social History     Social History   • Marital status: Legally      Spouse name: N/A   • Number of children: N/A   • Years of education: N/A     Social History Main Topics   • Smoking status: Current Every Day Smoker     Packs/day: 0.50   • Smokeless tobacco: Never Used   • Alcohol use No   • Drug use: No   • Sexual activity: Defer     Other Topics Concern   • None     Social History Narrative       Review of Systems  Review of Systems   Constitutional: Negative for activity change, appetite change, diaphoresis and fatigue.   HENT: Negative for facial swelling, sneezing, sore throat, tinnitus, trouble swallowing and voice change.    Eyes: Negative for photophobia, pain, discharge, redness, itching and visual disturbance.   Respiratory: Negative for apnea, cough, choking, chest tightness and shortness of breath.    Cardiovascular: Negative for chest pain, palpitations and leg swelling.   Gastrointestinal: Negative for abdominal distention, abdominal pain, constipation, diarrhea, nausea and vomiting.   Endocrine: Negative for cold intolerance, heat intolerance, polydipsia, polyphagia and polyuria.   Genitourinary: Negative for difficulty urinating, dysuria, frequency, hematuria and urgency.   Musculoskeletal: Negative for arthralgias, back pain, gait problem, joint swelling, myalgias,  "neck pain and neck stiffness.   Skin: Negative for color change, pallor, rash and wound.   Neurological: Negative for dizziness, tremors, weakness, light-headedness, numbness and headaches.   Hematological: Negative for adenopathy. Does not bruise/bleed easily.   Psychiatric/Behavioral: Negative for behavioral problems, confusion and sleep disturbance.        Objective    /80 (BP Location: Left arm, Patient Position: Sitting, Cuff Size: Adult)  Pulse 93  Ht 160 cm (63\")  Wt 72.6 kg (160 lb) Comment: est  BMI 28.34 kg/m2  Physical Exam   Constitutional: She is oriented to person, place, and time. She appears well-developed and well-nourished. No distress.   HENT:   Head: Normocephalic and atraumatic.   Right Ear: External ear normal.   Left Ear: External ear normal.   Nose: Nose normal.   Eyes: Conjunctivae and EOM are normal. Pupils are equal, round, and reactive to light.   Neck: Normal range of motion. Neck supple. No tracheal deviation present. No thyromegaly present.   Cardiovascular: Normal rate, regular rhythm and normal heart sounds.    No murmur heard.  Pulmonary/Chest: Effort normal and breath sounds normal. No respiratory distress. She has no wheezes.   Abdominal: Soft. Bowel sounds are normal. There is no tenderness. There is no rebound and no guarding.   Musculoskeletal: Normal range of motion. She exhibits no edema, tenderness or deformity.   Boot on left foot    Neurological: She is alert and oriented to person, place, and time. No cranial nerve deficit.   Skin: Skin is warm and dry. No rash noted.   Psychiatric: She has a normal mood and affect. Her behavior is normal. Judgment and thought content normal.       Lab Review  Glucose (mg/dL)   Date Value   07/27/2017 360 (H)     Sodium (mmol/L)   Date Value   07/27/2017 134 (L)     Potassium (mmol/L)   Date Value   07/27/2017 4.1     Chloride (mmol/L)   Date Value   07/27/2017 101     CO2 (mmol/L)   Date Value   07/27/2017 23.0 (L)     BUN " (mg/dL)   Date Value   07/27/2017 15     Creatinine (mg/dL)   Date Value   07/27/2017 0.88       Assessment/Plan      1. Uncontrolled type 2 diabetes mellitus without complication, without long-term current use of insulin    2. Diabetic polyneuropathy associated with type 2 diabetes mellitus    3. Hypertension associated with diabetes    4. Mixed diabetic hyperlipidemia associated with type 2 diabetes mellitus    5. Vitamin D deficiency     6. Tobacco use    .    Medications prescribed:  Outpatient Encounter Prescriptions as of 1/25/2018   Medication Sig Dispense Refill   • albuterol (ACCUNEB) 0.63 MG/3ML nebulizer solution Take 1 ampule by nebulization every 6 hours as needed for Wheezing     • Alcohol Swabs (ALCOHOL WIPES) pads Use 4 x daily 120 each 11   • ALPRAZolam (XANAX) 1 MG tablet Take 1 mg by mouth 3 (Three) Times a Day As Needed for Anxiety.     • amitriptyline (ELAVIL) 10 MG tablet Take 10 mg by mouth Every Night.     • aspirin 81 MG tablet Take 1 tablet by mouth Daily. 30 tablet 11   • atorvastatin (LIPITOR) 40 MG tablet Take 40 mg by mouth Daily.     • Blood Glucose Monitoring Suppl w/Device kit USE AS INDICATED, ANY MONITOR 1 each 1   • cyclobenzaprine (FLEXERIL) 10 MG tablet Take 10 mg by mouth 3 (Three) Times a Day As Needed for Muscle Spasms.     • diltiaZEM CD (CARDIZEM CD) 180 MG 24 hr capsule Take 180 mg by mouth Daily.     • Dulaglutide 0.75 MG/0.5ML solution pen-injector Inject 0.75 mg under the skin 1 (One) Time Per Week. 4 pen 11   • Empagliflozin-Metformin HCl ER (SYNJARDY XR) 5-1000 MG tablet sustained-release 24 hour Take 2 tablet/day by mouth Daily With Breakfast. 60 tablet 11   • esomeprazole (nexIUM) 40 MG capsule Take 40 mg by mouth Every Morning Before Breakfast.     • fluconazole (DIFLUCAN) 150 MG tablet Take 1 tablet by mouth 1 (One) Time Per Week. 4 tablet 6   • fluticasone-salmeterol (ADVAIR) 250-50 MCG/DOSE DISKUS Inhale 1 puff 2 (Two) Times a Day.     • furosemide (LASIX) 40 MG  tablet Take 40 mg by mouth Daily.     • gabapentin (NEURONTIN) 400 MG capsule 2 capsules  capsule 5   • gabapentin (NEURONTIN) 600 MG tablet Take 1.5 tablets by mouth 3 (Three) Times a Day. 135 tablet 5   • Glucose Blood (BLOOD GLUCOSE TEST) strip Use 4 x daily, use any brand covered by insurance or same brand as before 120 each 11   • HYDROcodone-acetaminophen (NORCO) 7.5-325 MG per tablet Take 1 tablet by mouth Every 4 (Four) Hours As Needed for Moderate Pain (4-6).     • Insulin Degludec (TRESIBA FLEXTOUCH) 200 UNIT/ML solution pen-injector Inject 100 Units under the skin Every Night. Up to 150 units daily 8 pen 11   • insulin detemir (LEVEMIR) 100 UNIT/ML injection Inject 70 Units under the skin Every Morning.     • insulin detemir (LEVEMIR) 100 UNIT/ML injection Inject 86 Units under the skin Every Evening.     • Insulin Lispro (HUMALOG KWIKPEN) 200 UNIT/ML solution pen-injector Inject 40 Units under the skin 3 (Three) Times a Day With Meals. 6 pen 11   • Insulin Pen Needle (B-D UF III MINI PEN NEEDLES) 31G X 5 MM misc Use 4 times daily 120 each 11   • Lancet Devices (LANCING DEVICE) misc USE AS INDICATED TO CORRELATE WITH STRIPS AND METER 1 each 1   • Lancets 30G misc USE 4 X DAILY 120 each 11   • levothyroxine (SYNTHROID, LEVOTHROID) 25 MCG tablet Take 25 mcg by mouth Daily.     • lisinopril (PRINIVIL,ZESTRIL) 20 MG tablet Take 20 mg by mouth Daily.     • loratadine (CLARITIN) 10 MG tablet Take 10 mg by mouth Daily.     • sulfamethoxazole-trimethoprim (BACTRIM,SEPTRA) 400-80 MG tablet Take 1 tablet by mouth 2 (Two) Times a Day. 20 tablet 0   • varenicline (CHANTIX) 0.5 MG tablet Take 0.5 mg by mouth 2 (Two) Times a Day.     • [DISCONTINUED] Dulaglutide 0.75 MG/0.5ML solution pen-injector Inject 0.75 mg under the skin 1 (One) Time Per Week. 4 pen 11   • [DISCONTINUED] Insulin Degludec (TRESIBA FLEXTOUCH) 200 UNIT/ML solution pen-injector Inject 100 Units under the skin Every Night. Up to 150 units daily  8 pen 11   • [DISCONTINUED] Insulin Lispro (HUMALOG KWIKPEN) 200 UNIT/ML solution pen-injector Inject 40 Units under the skin 3 (Three) Times a Day With Meals. 6 pen 11     No facility-administered encounter medications on file as of 1/25/2018.        Orders placed during this encounter include:  Orders Placed This Encounter   Procedures   • Comprehensive Metabolic Panel   • Hemoglobin A1c   • TSH   • Vitamin D 25 Hydroxy   • Lipid Panel   • Comprehensive Metabolic Panel   • Hemoglobin A1c   • TSH   • Vitamin D 25 Hydroxy   • Lipid Panel   • C-Peptide   • CBC & Differential     Order Specific Question:   Manual Differential     Answer:   No   • CBC & Differential     Order Specific Question:   Manual Differential     Answer:   No     Glycemic Management        Metformin 1 gram once daily with lunch   Having frequent GMI - I will still add jardiance and combine with metformin      Start synjardy xr5/1000, 2 tabs w bkfast and add diflucan 150 mg weekly          trulicity 0.75 mg weekly - taking             Tresiba -- taking 80 units       apply 50 point rule           Novolog doing 4 units per 15 grams of CHO      + sliding scale      3 per 50             Lipid Management        On lipitor 40 mg qhs      Blood Pressure Management:             bp controlled on ace I regimen            Microvascular Complication Monitoring:       Eye Exam Evaluation, within 1 year      -----------     Last Microalbumin-Proteinuria Assessment     No results found for: MALBCRERATIO     No results found for: UTPCR     -----------        Neuropathy, yes . On neurontin 300 mg tid, may increase up to 900 mg tid --change to 400 mg capsule two TID         Immunizations:             Preventive Care:       I advised the patient of the risks in continuing to use tobacco, and I provided this patient with smoking cessation educational materials.  I also discussed how to quit smoking and the patient has expressed the willingness to quit.       During  this visit, I spent 3-10 mintues counseling the patient regarding smoking cessation.               Weight Related:         Wt Readings from Last 3 Encounters:   07/27/17 160 lb (72.6 kg)   07/17/17 167 lb (75.8 kg)   06/26/17 168 lb (76.2 kg)      There is no height or weight on file to calculate BMI.           Diet interventions: moderate (500 kCal/d) deficit diet.        Bone Health               Lab Results   Component Value Date     CALCIUM 9.6 07/27/2017         Thyroid Health     No results found for: TSH                 Other Diabetes Related Aspects         No results found for: [id number]             4. Follow-up: Return in about 3 months (around 4/25/2018) for Recheck.

## 2018-01-26 ENCOUNTER — TELEPHONE (OUTPATIENT)
Dept: ENDOCRINOLOGY | Facility: CLINIC | Age: 63
End: 2018-01-26

## 2018-02-21 ENCOUNTER — OFFICE VISIT (OUTPATIENT)
Dept: PRIMARY CARE CLINIC | Age: 63
End: 2018-02-21
Payer: MEDICARE

## 2018-02-21 VITALS
HEIGHT: 63 IN | BODY MASS INDEX: 32.46 KG/M2 | OXYGEN SATURATION: 98 % | TEMPERATURE: 97.5 F | DIASTOLIC BLOOD PRESSURE: 84 MMHG | HEART RATE: 95 BPM | WEIGHT: 183.2 LBS | SYSTOLIC BLOOD PRESSURE: 132 MMHG

## 2018-02-21 DIAGNOSIS — F41.9 ANXIETY: ICD-10-CM

## 2018-02-21 DIAGNOSIS — I10 ESSENTIAL HYPERTENSION: ICD-10-CM

## 2018-02-21 DIAGNOSIS — S92.902S CLOSED FRACTURE OF LEFT FOOT, SEQUELA: Primary | ICD-10-CM

## 2018-02-21 DIAGNOSIS — Z79.4 TYPE 2 DIABETES MELLITUS WITH MICROALBUMINURIA, WITH LONG-TERM CURRENT USE OF INSULIN (HCC): ICD-10-CM

## 2018-02-21 DIAGNOSIS — E55.9 VITAMIN D DEFICIENCY: ICD-10-CM

## 2018-02-21 DIAGNOSIS — R80.9 TYPE 2 DIABETES MELLITUS WITH MICROALBUMINURIA, WITH LONG-TERM CURRENT USE OF INSULIN (HCC): ICD-10-CM

## 2018-02-21 DIAGNOSIS — G89.29 CHRONIC BILATERAL LOW BACK PAIN WITHOUT SCIATICA: ICD-10-CM

## 2018-02-21 DIAGNOSIS — E11.29 TYPE 2 DIABETES MELLITUS WITH MICROALBUMINURIA, WITH LONG-TERM CURRENT USE OF INSULIN (HCC): ICD-10-CM

## 2018-02-21 DIAGNOSIS — Z23 NEED FOR PNEUMOCOCCAL VACCINATION: ICD-10-CM

## 2018-02-21 DIAGNOSIS — Z78.0 POSTMENOPAUSAL: ICD-10-CM

## 2018-02-21 DIAGNOSIS — M54.50 CHRONIC BILATERAL LOW BACK PAIN WITHOUT SCIATICA: ICD-10-CM

## 2018-02-21 LAB
AMPHETAMINE SCREEN, URINE: NORMAL
BARBITURATE SCREEN, URINE: NORMAL
BENZODIAZEPINE SCREEN, URINE: POSITIVE
COCAINE METABOLITE SCREEN URINE: NORMAL
MDMA URINE: NORMAL
METHADONE SCREEN, URINE: NORMAL
METHAMPHETAMINE, URINE: NORMAL
OPIATE SCREEN URINE: NORMAL
OXYCODONE SCREEN URINE: NORMAL
PHENCYCLIDINE SCREEN URINE: NORMAL
PROPOXYPHENE SCREEN, URINE: NORMAL
THC: NORMAL
TRICYCLIC ANTIDEPRESSANTS, UR: NORMAL

## 2018-02-21 PROCEDURE — G8428 CUR MEDS NOT DOCUMENT: HCPCS | Performed by: FAMILY MEDICINE

## 2018-02-21 PROCEDURE — 3046F HEMOGLOBIN A1C LEVEL >9.0%: CPT | Performed by: FAMILY MEDICINE

## 2018-02-21 PROCEDURE — 3014F SCREEN MAMMO DOC REV: CPT | Performed by: FAMILY MEDICINE

## 2018-02-21 PROCEDURE — G8484 FLU IMMUNIZE NO ADMIN: HCPCS | Performed by: FAMILY MEDICINE

## 2018-02-21 PROCEDURE — G8417 CALC BMI ABV UP PARAM F/U: HCPCS | Performed by: FAMILY MEDICINE

## 2018-02-21 PROCEDURE — 99214 OFFICE O/P EST MOD 30 MIN: CPT | Performed by: FAMILY MEDICINE

## 2018-02-21 PROCEDURE — 90732 PPSV23 VACC 2 YRS+ SUBQ/IM: CPT | Performed by: FAMILY MEDICINE

## 2018-02-21 PROCEDURE — 3017F COLORECTAL CA SCREEN DOC REV: CPT | Performed by: FAMILY MEDICINE

## 2018-02-21 PROCEDURE — G0009 ADMIN PNEUMOCOCCAL VACCINE: HCPCS | Performed by: FAMILY MEDICINE

## 2018-02-21 PROCEDURE — 4004F PT TOBACCO SCREEN RCVD TLK: CPT | Performed by: FAMILY MEDICINE

## 2018-02-21 PROCEDURE — 80305 DRUG TEST PRSMV DIR OPT OBS: CPT | Performed by: FAMILY MEDICINE

## 2018-02-21 RX ORDER — ALPRAZOLAM 0.5 MG/1
0.5 TABLET ORAL 3 TIMES DAILY PRN
Qty: 90 TABLET | Refills: 1 | Status: SHIPPED | OUTPATIENT
Start: 2018-02-21 | End: 2018-03-14

## 2018-02-21 RX ORDER — HYDROCODONE BITARTRATE AND ACETAMINOPHEN 7.5; 325 MG/1; MG/1
1 TABLET ORAL 4 TIMES DAILY
Qty: 120 TABLET | Refills: 0 | Status: SHIPPED | OUTPATIENT
Start: 2018-02-21 | End: 2018-03-20 | Stop reason: SDUPTHER

## 2018-02-21 RX ORDER — GABAPENTIN 400 MG/1
CAPSULE ORAL
COMMUNITY
Start: 2018-01-25 | End: 2018-03-20 | Stop reason: SDUPTHER

## 2018-02-21 RX ORDER — PAROXETINE HYDROCHLORIDE 20 MG/1
20 TABLET, FILM COATED ORAL EVERY MORNING
Qty: 30 TABLET | Refills: 3 | Status: SHIPPED | OUTPATIENT
Start: 2018-02-21 | End: 2018-05-29

## 2018-02-21 RX ORDER — CIPROFLOXACIN 250 MG/1
TABLET, FILM COATED ORAL
Refills: 0 | COMMUNITY
Start: 2017-12-09 | End: 2018-03-20 | Stop reason: SDUPTHER

## 2018-02-21 ASSESSMENT — PATIENT HEALTH QUESTIONNAIRE - PHQ9
1. LITTLE INTEREST OR PLEASURE IN DOING THINGS: 0
SUM OF ALL RESPONSES TO PHQ QUESTIONS 1-9: 0
2. FEELING DOWN, DEPRESSED OR HOPELESS: 0
SUM OF ALL RESPONSES TO PHQ9 QUESTIONS 1 & 2: 0

## 2018-03-02 ENCOUNTER — TELEPHONE (OUTPATIENT)
Dept: PRIMARY CARE CLINIC | Age: 63
End: 2018-03-02

## 2018-03-02 ASSESSMENT — ENCOUNTER SYMPTOMS
SHORTNESS OF BREATH: 0
COLOR CHANGE: 0
COUGH: 0

## 2018-03-02 NOTE — PROGRESS NOTES
Provider, MD   Dulaglutide 0.75 MG/0.5ML SOPN Inject 0.75 mg into the skin once a week   Yes Historical Provider, MD   triamcinolone (KENALOG) 0.1 % cream Apply topically 2 times daily Apply topically 2 times daily. Yes Historical Provider, MD   promethazine-dextromethorphan (PROMETHAZINE-DM) 6.25-15 MG/5ML syrup Take 5 mLs by mouth 4 times daily as needed for Cough   Yes Historical Provider, MD   metFORMIN (GLUCOPHAGE) 1000 MG tablet Take 1,000 mg by mouth daily (with breakfast)   Yes Historical Provider, MD   dicyclomine (BENTYL) 10 MG capsule Take 10 mg by mouth 4 times daily (before meals and nightly)   Yes Historical Provider, MD   amitriptyline (ELAVIL) 10 MG tablet Take 10 mg by mouth nightly   Yes Historical Provider, MD   naproxen (NAPROSYN) 500 MG tablet Take 1 tablet by mouth 2 times daily 12/29/15  Yes Sukh Certain, APRN   insulin detemir (LEVEMIR) 100 UNIT/ML injection vial Inject 60 Units into the skin daily Indications: 82 at night 80 units at night   Yes Historical Provider, MD   fluticasone-salmeterol (ADVAIR DISKUS) 250-50 MCG/DOSE AEPB Inhale 1 puff into the lungs every 12 hours. Yes Historical Provider, MD   tiotropium (SPIRIVA HANDIHALER) 18 MCG inhalation capsule Inhale 18 mcg into the lungs daily. Yes Historical Provider, MD       Past Medical History:   Diagnosis Date    Anxiety     Arthritis     Bilateral cataracts     Chronic kidney disease     [facility]     Diabetes mellitus ([facility]     Diabetes mellitus ([facility] [facility]     Hyperlipidemia     Cholesterol management per pcp.      Hypertension     IBS (irritable bowel syndrome)     Pericardial effusion     Peripheral vascular disease (HCC)     Smoker        Past Surgical History:   Procedure Laterality Date    APPENDECTOMY      CHOLECYSTECTOMY      COLONOSCOPY      ENDOSCOPY, COLON, DIAGNOSTIC      FINGER SURGERY      HYSTERECTOMY, TOTAL ABDOMINAL         Social History     Social long-term current use of insulin (HCC) E11.29 250.40 Will check Hgb A1c, CMP, microalbumin, and Lipid panel. Continue to follow up with endocrinology. R80.9 791.0     Z79.4 V58.67          5. Chronic bilateral low back pain without sciatica M54.5 724.2 POCT Rapid Drug Screen- appropriate. WIll try to wean norco to 90 per month after foot has healed. G89.29 338.29    6. Postmenopausal Z78.0 V49.81 DEXA BONE DENSITY 2 SITES to evaluate for osteoporosis. 7. Anxiety F41.9 300.00 ALPRAZolam (XANAX) 0.5 MG tablet      PARoxetine (PAXIL) 20 MG tablet   8. Vitamin D deficiency E55.9 268.9 Vitamin D 25 Hydroxy           Plan    Orders Placed This Encounter   Medications    ALPRAZolam (XANAX) 0.5 MG tablet     Sig: Take 1 tablet by mouth 3 times daily as needed for Anxiety for up to 30 days. Dispense:  90 tablet     Refill:  1    HYDROcodone-acetaminophen (NORCO) 7.5-325 MG per tablet     Sig: Take 1 tablet by mouth 4 times daily for 30 days Up to 6 per pt. Dispense:  120 tablet     Refill:  0    PARoxetine (PAXIL) 20 MG tablet     Sig: Take 1 tablet by mouth every morning     Dispense:  30 tablet     Refill:  3       Orders Placed This Encounter   Procedures    DEXA BONE DENSITY 2 SITES    PNEUMOVAX 23 subcutaneous/IM (Pneumococcal polysaccharide vaccine 23-valent >= 1yo)    Comprehensive Metabolic Panel    Hemoglobin A1C    Microalbumin / Creatinine Urine Ratio    Vitamin D 25 Hydroxy    Lipid Panel    POCT Rapid Drug Screen        Return in about 3 months (around 5/21/2018) for Anxiety. There are no Patient Instructions on file for this visit.

## 2018-03-13 ENCOUNTER — TELEPHONE (OUTPATIENT)
Dept: PRIMARY CARE CLINIC | Age: 63
End: 2018-03-13

## 2018-03-14 RX ORDER — ALPRAZOLAM 1 MG/1
1 TABLET ORAL NIGHTLY PRN
Qty: 30 TABLET | Refills: 2 | OUTPATIENT
Start: 2018-03-14 | End: 2018-04-13

## 2018-03-16 ENCOUNTER — TELEPHONE (OUTPATIENT)
Dept: PRIMARY CARE CLINIC | Age: 63
End: 2018-03-16

## 2018-03-20 DIAGNOSIS — S92.902S CLOSED FRACTURE OF LEFT FOOT, SEQUELA: ICD-10-CM

## 2018-03-20 RX ORDER — GABAPENTIN 400 MG/1
CAPSULE ORAL
Qty: 90 CAPSULE | Refills: 0 | Status: SHIPPED | OUTPATIENT
Start: 2018-03-20 | End: 2018-06-12 | Stop reason: SDUPTHER

## 2018-03-20 RX ORDER — HYDROCODONE BITARTRATE AND ACETAMINOPHEN 7.5; 325 MG/1; MG/1
1 TABLET ORAL 4 TIMES DAILY
Qty: 120 TABLET | Refills: 0 | Status: SHIPPED | OUTPATIENT
Start: 2018-03-20 | End: 2018-04-17 | Stop reason: SDUPTHER

## 2018-03-20 RX ORDER — DICYCLOMINE HYDROCHLORIDE 10 MG/1
10 CAPSULE ORAL
Qty: 120 CAPSULE | Refills: 5 | Status: SHIPPED | OUTPATIENT
Start: 2018-03-20 | End: 2018-10-09 | Stop reason: SDUPTHER

## 2018-03-20 RX ORDER — TRIAMCINOLONE ACETONIDE 1 MG/G
CREAM TOPICAL
Qty: 80 G | Refills: 5 | Status: SHIPPED | OUTPATIENT
Start: 2018-03-20 | End: 2022-01-18 | Stop reason: SDUPTHER

## 2018-03-20 RX ORDER — AMITRIPTYLINE HYDROCHLORIDE 10 MG/1
10 TABLET, FILM COATED ORAL NIGHTLY
Qty: 30 TABLET | Refills: 5 | Status: SHIPPED | OUTPATIENT
Start: 2018-03-20 | End: 2018-10-09 | Stop reason: SDUPTHER

## 2018-03-20 RX ORDER — NAPROXEN 500 MG/1
500 TABLET ORAL 2 TIMES DAILY
Qty: 20 TABLET | Refills: 0 | Status: SHIPPED | OUTPATIENT
Start: 2018-03-20 | End: 2020-12-08

## 2018-03-20 RX ORDER — DEXTROMETHORPHAN HYDROBROMIDE AND PROMETHAZINE HYDROCHLORIDE 15; 6.25 MG/5ML; MG/5ML
5 SYRUP ORAL 4 TIMES DAILY PRN
Qty: 180 ML | Refills: 2 | Status: SHIPPED | OUTPATIENT
Start: 2018-03-20 | End: 2020-12-08

## 2018-03-20 RX ORDER — LORATADINE 10 MG/1
10 TABLET ORAL DAILY
Qty: 30 TABLET | Refills: 5 | Status: SHIPPED | OUTPATIENT
Start: 2018-03-20 | End: 2018-05-29

## 2018-03-20 RX ORDER — CIPROFLOXACIN 250 MG/1
250 TABLET, FILM COATED ORAL 2 TIMES DAILY
Qty: 60 TABLET | Refills: 5 | Status: SHIPPED | OUTPATIENT
Start: 2018-03-20 | End: 2018-11-13 | Stop reason: SDUPTHER

## 2018-03-22 DIAGNOSIS — F41.9 ANXIETY: ICD-10-CM

## 2018-03-22 RX ORDER — ALPRAZOLAM 0.5 MG/1
TABLET ORAL
Qty: 90 TABLET | Refills: 1 | Status: SHIPPED | OUTPATIENT
Start: 2018-03-22 | End: 2018-05-19 | Stop reason: SDUPTHER

## 2018-04-17 DIAGNOSIS — S92.902S CLOSED FRACTURE OF LEFT FOOT, SEQUELA: ICD-10-CM

## 2018-04-17 RX ORDER — HYDROCODONE BITARTRATE AND ACETAMINOPHEN 7.5; 325 MG/1; MG/1
TABLET ORAL
Qty: 110 TABLET | Refills: 0 | Status: SHIPPED | OUTPATIENT
Start: 2018-04-20 | End: 2018-05-11 | Stop reason: SDUPTHER

## 2018-05-11 DIAGNOSIS — S92.902S CLOSED FRACTURE OF LEFT FOOT, SEQUELA: ICD-10-CM

## 2018-05-15 RX ORDER — HYDROCODONE BITARTRATE AND ACETAMINOPHEN 7.5; 325 MG/1; MG/1
TABLET ORAL
Qty: 100 TABLET | Refills: 0 | Status: SHIPPED | OUTPATIENT
Start: 2018-05-19 | End: 2018-06-12 | Stop reason: SDUPTHER

## 2018-05-19 DIAGNOSIS — F41.9 ANXIETY: ICD-10-CM

## 2018-05-21 RX ORDER — ALPRAZOLAM 0.5 MG/1
TABLET ORAL
Qty: 90 TABLET | Refills: 1 | Status: SHIPPED | OUTPATIENT
Start: 2018-05-21 | End: 2018-08-10 | Stop reason: SDUPTHER

## 2018-05-29 ENCOUNTER — OFFICE VISIT (OUTPATIENT)
Dept: PRIMARY CARE CLINIC | Age: 63
End: 2018-05-29
Payer: MEDICARE

## 2018-05-29 VITALS
SYSTOLIC BLOOD PRESSURE: 108 MMHG | HEART RATE: 100 BPM | OXYGEN SATURATION: 99 % | WEIGHT: 177.8 LBS | BODY MASS INDEX: 31.5 KG/M2 | DIASTOLIC BLOOD PRESSURE: 64 MMHG

## 2018-05-29 DIAGNOSIS — Z79.4 TYPE 2 DIABETES MELLITUS WITH MICROALBUMINURIA, WITH LONG-TERM CURRENT USE OF INSULIN (HCC): ICD-10-CM

## 2018-05-29 DIAGNOSIS — I10 ESSENTIAL HYPERTENSION: Primary | ICD-10-CM

## 2018-05-29 DIAGNOSIS — R80.9 TYPE 2 DIABETES MELLITUS WITH MICROALBUMINURIA, WITH LONG-TERM CURRENT USE OF INSULIN (HCC): ICD-10-CM

## 2018-05-29 DIAGNOSIS — E11.29 TYPE 2 DIABETES MELLITUS WITH MICROALBUMINURIA, WITH LONG-TERM CURRENT USE OF INSULIN (HCC): ICD-10-CM

## 2018-05-29 PROCEDURE — 2022F DILAT RTA XM EVC RTNOPTHY: CPT | Performed by: INTERNAL MEDICINE

## 2018-05-29 PROCEDURE — 99214 OFFICE O/P EST MOD 30 MIN: CPT | Performed by: INTERNAL MEDICINE

## 2018-05-29 PROCEDURE — G8427 DOCREV CUR MEDS BY ELIG CLIN: HCPCS | Performed by: INTERNAL MEDICINE

## 2018-05-29 PROCEDURE — 4004F PT TOBACCO SCREEN RCVD TLK: CPT | Performed by: INTERNAL MEDICINE

## 2018-05-29 PROCEDURE — 3046F HEMOGLOBIN A1C LEVEL >9.0%: CPT | Performed by: INTERNAL MEDICINE

## 2018-05-29 PROCEDURE — G8417 CALC BMI ABV UP PARAM F/U: HCPCS | Performed by: INTERNAL MEDICINE

## 2018-05-29 PROCEDURE — 3017F COLORECTAL CA SCREEN DOC REV: CPT | Performed by: INTERNAL MEDICINE

## 2018-05-29 RX ORDER — MONTELUKAST SODIUM 10 MG/1
10 TABLET ORAL NIGHTLY
Qty: 30 TABLET | Refills: 0 | Status: SHIPPED | OUTPATIENT
Start: 2018-05-29 | End: 2018-11-06 | Stop reason: SDUPTHER

## 2018-05-29 ASSESSMENT — ENCOUNTER SYMPTOMS
SHORTNESS OF BREATH: 0
RHINORRHEA: 0
VOMITING: 0
COUGH: 0
NAUSEA: 0
BACK PAIN: 0
CONSTIPATION: 0
DIARRHEA: 0

## 2018-06-12 DIAGNOSIS — G62.9 NEUROPATHY: Primary | ICD-10-CM

## 2018-06-12 DIAGNOSIS — S92.902S CLOSED FRACTURE OF LEFT FOOT, SEQUELA: ICD-10-CM

## 2018-06-12 RX ORDER — ALPRAZOLAM 1 MG/1
TABLET ORAL
Qty: 30 TABLET | Refills: 3 | OUTPATIENT
Start: 2018-06-12

## 2018-06-12 RX ORDER — GABAPENTIN 400 MG/1
CAPSULE ORAL
Qty: 90 CAPSULE | Refills: 0 | Status: SHIPPED | OUTPATIENT
Start: 2018-06-12 | End: 2018-07-17 | Stop reason: SDUPTHER

## 2018-06-12 RX ORDER — HYDROCODONE BITARTRATE AND ACETAMINOPHEN 7.5; 325 MG/1; MG/1
1 TABLET ORAL 3 TIMES DAILY PRN
Qty: 90 TABLET | Refills: 0 | Status: SHIPPED | OUTPATIENT
Start: 2018-06-18 | End: 2018-07-14 | Stop reason: SDUPTHER

## 2018-06-18 RX ORDER — GABAPENTIN 400 MG/1
CAPSULE ORAL
Qty: 180 CAPSULE | Refills: 5 | OUTPATIENT
Start: 2018-06-18

## 2018-06-18 NOTE — TELEPHONE ENCOUNTER
She has an appt tomorrow with Burgos does she want refilled today or does she want to see him first

## 2018-07-12 DIAGNOSIS — F41.9 ANXIETY: ICD-10-CM

## 2018-07-12 RX ORDER — ALPRAZOLAM 0.5 MG/1
TABLET ORAL
Qty: 90 TABLET | Refills: 1 | OUTPATIENT
Start: 2018-07-12 | End: 2019-07-12

## 2018-07-12 RX ORDER — ALPRAZOLAM 1 MG/1
1 TABLET ORAL NIGHTLY PRN
Qty: 30 TABLET | Refills: 2 | Status: CANCELLED | OUTPATIENT
Start: 2018-07-12 | End: 2018-08-11

## 2018-07-16 RX ORDER — GABAPENTIN 400 MG/1
CAPSULE ORAL
Qty: 180 CAPSULE | Refills: 5 | OUTPATIENT
Start: 2018-07-16

## 2018-07-16 NOTE — TELEPHONE ENCOUNTER
GABAPENTIN 400 MG CAPSULE 400 CAP  Will file in chart as: gabapentin (NEURONTIN) 400 MG capsule  TAKE 2 CAPSULES BY MOUTH THREE TIMES A DAY  Disp: 180 capsule Refills: 5    Class: Normal Start: 7/16/2018   Refused by: Malinda De León MA  Refusal reason: Patient should contact provider first  Fill requested from: [facility] - [place], [place] - [address] - [phone] [facility] [phone] FXPhone: [phone]  Med Refill     SIMONA Sanchez   You 21 minutes ago (9:31 AM)      No she has not been here since Jan. And has no follow up appt

## 2018-07-17 DIAGNOSIS — G62.9 NEUROPATHY: ICD-10-CM

## 2018-07-17 RX ORDER — GABAPENTIN 400 MG/1
CAPSULE ORAL
Qty: 180 CAPSULE | Refills: 3 | Status: SHIPPED | OUTPATIENT
Start: 2018-07-17 | End: 2018-10-09 | Stop reason: SDUPTHER

## 2018-09-05 RX ORDER — ATORVASTATIN CALCIUM 20 MG/1
40 TABLET, FILM COATED ORAL DAILY
Qty: 180 TABLET | Refills: 2 | Status: SHIPPED | OUTPATIENT
Start: 2018-09-05 | End: 2019-01-02 | Stop reason: SDUPTHER

## 2018-09-11 ENCOUNTER — OFFICE VISIT (OUTPATIENT)
Dept: FAMILY MEDICINE CLINIC | Age: 63
End: 2018-09-11
Payer: MEDICARE

## 2018-09-11 VITALS
TEMPERATURE: 96.6 F | HEART RATE: 89 BPM | OXYGEN SATURATION: 97 % | BODY MASS INDEX: 28.88 KG/M2 | HEIGHT: 63 IN | WEIGHT: 163 LBS

## 2018-09-11 DIAGNOSIS — Z12.31 ENCOUNTER FOR SCREENING MAMMOGRAM FOR BREAST CANCER: ICD-10-CM

## 2018-09-11 DIAGNOSIS — G89.4 CHRONIC PAIN SYNDROME: Primary | ICD-10-CM

## 2018-09-11 DIAGNOSIS — Z13.220 SCREENING FOR HYPERLIPIDEMIA: ICD-10-CM

## 2018-09-11 DIAGNOSIS — S92.902S CLOSED FRACTURE OF LEFT FOOT, SEQUELA: ICD-10-CM

## 2018-09-11 DIAGNOSIS — F41.9 ANXIETY: ICD-10-CM

## 2018-09-11 PROBLEM — E11.42 DIABETIC POLYNEUROPATHY ASSOCIATED WITH TYPE 2 DIABETES MELLITUS (HCC): Status: ACTIVE | Noted: 2017-09-28

## 2018-09-11 PROBLEM — Z72.0 TOBACCO USE: Status: ACTIVE | Noted: 2017-06-15

## 2018-09-11 PROCEDURE — G8427 DOCREV CUR MEDS BY ELIG CLIN: HCPCS | Performed by: INTERNAL MEDICINE

## 2018-09-11 PROCEDURE — G8417 CALC BMI ABV UP PARAM F/U: HCPCS | Performed by: INTERNAL MEDICINE

## 2018-09-11 PROCEDURE — 99213 OFFICE O/P EST LOW 20 MIN: CPT | Performed by: INTERNAL MEDICINE

## 2018-09-11 PROCEDURE — 3017F COLORECTAL CA SCREEN DOC REV: CPT | Performed by: INTERNAL MEDICINE

## 2018-09-11 PROCEDURE — 4004F PT TOBACCO SCREEN RCVD TLK: CPT | Performed by: INTERNAL MEDICINE

## 2018-09-11 RX ORDER — PROMETHAZINE HYDROCHLORIDE 25 MG/1
25 TABLET ORAL EVERY 6 HOURS PRN
Qty: 30 TABLET | Refills: 2 | Status: SHIPPED | OUTPATIENT
Start: 2018-09-11 | End: 2018-09-18

## 2018-09-11 RX ORDER — ALPRAZOLAM 1 MG/1
TABLET ORAL
Qty: 30 TABLET | Refills: 3 | Status: SHIPPED | OUTPATIENT
Start: 2018-09-11 | End: 2019-02-08 | Stop reason: SDUPTHER

## 2018-09-11 RX ORDER — HYDROCODONE BITARTRATE AND ACETAMINOPHEN 7.5; 325 MG/1; MG/1
1 TABLET ORAL EVERY 8 HOURS PRN
Qty: 90 TABLET | Refills: 0 | Status: SHIPPED | OUTPATIENT
Start: 2018-09-11 | End: 2018-10-09 | Stop reason: SDUPTHER

## 2018-09-11 ASSESSMENT — ENCOUNTER SYMPTOMS
BACK PAIN: 0
COUGH: 0
CONSTIPATION: 0
SORE THROAT: 1
DIARRHEA: 0
TROUBLE SWALLOWING: 1
VOMITING: 0
SHORTNESS OF BREATH: 0
NAUSEA: 0

## 2018-09-11 NOTE — PROGRESS NOTES
Subjective:      Patient ID: Juan Manuel Noriega is a 66 y.o. female. ADAIR Vincent returns for follow-up of chronic pain syndrome, anxiety, left foot pain, hyperlipidemia. She has developed a mass on the right side of her neck. This has been present for 2 weeks. It is quite painful. On examination, it is large bulky and non-mobile. She is scheduled to have a biopsy done by an ENT physician in [address] [place] [address]. This has been worked up by this physician, and of course, I have no records. So I do not know all of the details. Obviously with this presentation, there is concern that this could be malignancy. Review of Systems   Constitutional: Positive for fatigue and unexpected weight change. Negative for activity change. HENT: Positive for sore throat and trouble swallowing. Respiratory: Negative for cough and shortness of breath. Cardiovascular: Negative for chest pain and leg swelling. Gastrointestinal: Negative for constipation, diarrhea, nausea and vomiting. Genitourinary: Negative for difficulty urinating and dysuria. Musculoskeletal: Negative for arthralgias and back pain. Neurological: Negative for dizziness and headaches. Objective:   Physical Exam   Constitutional: She is oriented to person, place, and time. She appears well-developed and well-nourished. HENT:   Head: Normocephalic and atraumatic. Mouth/Throat: Oropharynx is clear and moist.   Eyes: Pupils are equal, round, and reactive to light. Conjunctivae are normal.   Cardiovascular: Normal rate, regular rhythm and normal heart sounds. Pulmonary/Chest: Effort normal and breath sounds normal.   Abdominal: Soft. Musculoskeletal: Normal range of motion. Neurological: She is alert and oriented to person, place, and time. Skin: Skin is warm and dry. Vitals reviewed. Assessment:       Diagnosis Orders   1.  Chronic pain syndrome  HYDROcodone-acetaminophen (NORCO) 7.5-325 MG per tablet    Hemoglobin A1c    [facility] DIABETES

## 2018-09-11 NOTE — PATIENT INSTRUCTIONS
Continue current medications. Phenergan 25 mg every 6 hours needed for nausea. Follow up again in 3 months.

## 2018-09-28 DIAGNOSIS — R92.8 ABNORMAL MAMMOGRAM: Primary | ICD-10-CM

## 2018-10-01 ENCOUNTER — TELEPHONE (OUTPATIENT)
Dept: FAMILY MEDICINE CLINIC | Age: 63
End: 2018-10-01

## 2018-10-09 DIAGNOSIS — G62.9 NEUROPATHY: ICD-10-CM

## 2018-10-09 DIAGNOSIS — G89.4 CHRONIC PAIN SYNDROME: ICD-10-CM

## 2018-10-09 RX ORDER — GABAPENTIN 400 MG/1
CAPSULE ORAL
Qty: 180 CAPSULE | Refills: 3 | Status: SHIPPED | OUTPATIENT
Start: 2018-10-09 | End: 2019-01-10 | Stop reason: SDUPTHER

## 2018-10-09 RX ORDER — LISINOPRIL 10 MG/1
20 TABLET ORAL DAILY
Qty: 30 TABLET | Refills: 5 | Status: SHIPPED | OUTPATIENT
Start: 2018-10-09 | End: 2019-10-17 | Stop reason: SDUPTHER

## 2018-10-09 RX ORDER — AMITRIPTYLINE HYDROCHLORIDE 10 MG/1
10 TABLET, FILM COATED ORAL NIGHTLY
Qty: 30 TABLET | Refills: 5 | Status: SHIPPED | OUTPATIENT
Start: 2018-10-09 | End: 2020-09-08

## 2018-10-09 RX ORDER — DICYCLOMINE HYDROCHLORIDE 10 MG/1
10 CAPSULE ORAL
Qty: 120 CAPSULE | Refills: 5 | Status: SHIPPED | OUTPATIENT
Start: 2018-10-09 | End: 2020-12-08

## 2018-10-09 RX ORDER — HYDROCODONE BITARTRATE AND ACETAMINOPHEN 7.5; 325 MG/1; MG/1
1 TABLET ORAL EVERY 8 HOURS PRN
Qty: 90 TABLET | Refills: 0 | Status: SHIPPED | OUTPATIENT
Start: 2018-10-09 | End: 2018-11-06 | Stop reason: SDUPTHER

## 2018-10-11 DIAGNOSIS — F41.9 ANXIETY: ICD-10-CM

## 2018-10-11 RX ORDER — ALPRAZOLAM 0.5 MG/1
TABLET ORAL
Qty: 90 TABLET | Refills: 1 | Status: SHIPPED | OUTPATIENT
Start: 2018-10-11 | End: 2018-11-09 | Stop reason: SDUPTHER

## 2018-10-12 ENCOUNTER — OFFICE VISIT (OUTPATIENT)
Dept: FAMILY MEDICINE CLINIC | Age: 63
End: 2018-10-12
Payer: MEDICARE

## 2018-10-12 VITALS
HEART RATE: 78 BPM | SYSTOLIC BLOOD PRESSURE: 146 MMHG | WEIGHT: 162 LBS | BODY MASS INDEX: 28.7 KG/M2 | TEMPERATURE: 97.6 F | OXYGEN SATURATION: 98 % | HEIGHT: 63 IN | DIASTOLIC BLOOD PRESSURE: 82 MMHG

## 2018-10-12 DIAGNOSIS — R59.0 CERVICAL ADENOPATHY: ICD-10-CM

## 2018-10-12 DIAGNOSIS — Z13.220 SCREENING FOR HYPERLIPIDEMIA: ICD-10-CM

## 2018-10-12 DIAGNOSIS — I63.9 CEREBROVASCULAR ACCIDENT (CVA), UNSPECIFIED MECHANISM (HCC): ICD-10-CM

## 2018-10-12 DIAGNOSIS — G89.4 CHRONIC PAIN SYNDROME: Primary | ICD-10-CM

## 2018-10-12 DIAGNOSIS — Z12.31 ENCOUNTER FOR SCREENING MAMMOGRAM FOR BREAST CANCER: ICD-10-CM

## 2018-10-12 DIAGNOSIS — S92.902S CLOSED FRACTURE OF LEFT FOOT, SEQUELA: ICD-10-CM

## 2018-10-12 DIAGNOSIS — G89.4 CHRONIC PAIN SYNDROME: ICD-10-CM

## 2018-10-12 LAB
ANION GAP SERPL CALCULATED.3IONS-SCNC: 14 MMOL/L (ref 7–19)
BUN BLDV-MCNC: 5 MG/DL (ref 8–23)
CALCIUM SERPL-MCNC: 9.6 MG/DL (ref 8.8–10.2)
CHLORIDE BLD-SCNC: 97 MMOL/L (ref 98–111)
CHOLESTEROL, TOTAL: 178 MG/DL (ref 160–199)
CO2: 27 MMOL/L (ref 22–29)
CREAT SERPL-MCNC: 0.6 MG/DL (ref 0.5–0.9)
CREATININE URINE: 78.5 MG/DL (ref 4.2–622)
GFR NON-AFRICAN AMERICAN: >60
GLUCOSE BLD-MCNC: 178 MG/DL (ref 74–109)
HBA1C MFR BLD: 11.9 % (ref 4–6)
HDLC SERPL-MCNC: 39 MG/DL (ref 65–121)
LDL CHOLESTEROL CALCULATED: 109 MG/DL
MICROALBUMIN UR-MCNC: 116.4 MG/DL (ref 0–19)
MICROALBUMIN/CREAT UR-RTO: 1482.8 MG/G
POTASSIUM SERPL-SCNC: 3.5 MMOL/L (ref 3.5–5)
RAPID HIV 1&2: NORMAL
SODIUM BLD-SCNC: 138 MMOL/L (ref 136–145)
TRIGL SERPL-MCNC: 152 MG/DL (ref 0–149)

## 2018-10-12 PROCEDURE — 99496 TRANSJ CARE MGMT HIGH F2F 7D: CPT | Performed by: INTERNAL MEDICINE

## 2018-10-12 RX ORDER — ESCITALOPRAM OXALATE 10 MG/1
10 TABLET ORAL DAILY
Qty: 30 TABLET | Refills: 3 | Status: SHIPPED | OUTPATIENT
Start: 2018-10-12 | End: 2019-01-10 | Stop reason: SDUPTHER

## 2018-10-12 NOTE — PATIENT INSTRUCTIONS
Start Lexapro 10 mg once a day. Continue the remainder of your medications. Follow up again in one month.

## 2018-10-13 ENCOUNTER — TELEPHONE (OUTPATIENT)
Dept: FAMILY MEDICINE CLINIC | Age: 63
End: 2018-10-13

## 2018-10-13 NOTE — TELEPHONE ENCOUNTER
Pt not on levemir and wishes to leave medication the same until until she comes back. Asked pt to  and let them know to see if they want to change anything and pt stated understanding.

## 2018-10-18 DIAGNOSIS — F41.9 ANXIETY: ICD-10-CM

## 2018-10-18 RX ORDER — ALPRAZOLAM 1 MG/1
TABLET ORAL
Qty: 30 TABLET | Refills: 3 | OUTPATIENT
Start: 2018-10-18 | End: 2019-10-18

## 2018-10-19 LAB
HCV QNT BY NAAT IU/ML: NOT DETECTED
HCV QNT BY NAAT LOG IU/ML: NOT DETECTED

## 2018-11-06 DIAGNOSIS — G89.4 CHRONIC PAIN SYNDROME: ICD-10-CM

## 2018-11-06 DIAGNOSIS — I63.9 CEREBROVASCULAR ACCIDENT (CVA), UNSPECIFIED MECHANISM (HCC): Primary | ICD-10-CM

## 2018-11-06 RX ORDER — MONTELUKAST SODIUM 10 MG/1
10 TABLET ORAL NIGHTLY
Qty: 30 TABLET | Refills: 0 | Status: SHIPPED | OUTPATIENT
Start: 2018-11-06 | End: 2019-02-26 | Stop reason: SDUPTHER

## 2018-11-06 RX ORDER — SUCRALFATE 1 G/1
1 TABLET ORAL 4 TIMES DAILY
Qty: 120 TABLET | Refills: 5 | Status: SHIPPED | OUTPATIENT
Start: 2018-11-06 | End: 2020-12-08

## 2018-11-07 RX ORDER — HYDROCODONE BITARTRATE AND ACETAMINOPHEN 7.5; 325 MG/1; MG/1
1 TABLET ORAL EVERY 8 HOURS PRN
Qty: 90 TABLET | Refills: 0 | Status: SHIPPED | OUTPATIENT
Start: 2018-11-07 | End: 2018-12-04 | Stop reason: SDUPTHER

## 2018-11-09 DIAGNOSIS — F41.9 ANXIETY: ICD-10-CM

## 2018-11-09 RX ORDER — ALPRAZOLAM 0.5 MG/1
TABLET ORAL
Qty: 90 TABLET | Refills: 1 | Status: SHIPPED | OUTPATIENT
Start: 2018-11-09 | End: 2018-12-04 | Stop reason: SDUPTHER

## 2018-11-13 ENCOUNTER — OFFICE VISIT (OUTPATIENT)
Dept: FAMILY MEDICINE CLINIC | Age: 63
End: 2018-11-13
Payer: MEDICARE

## 2018-11-13 VITALS
OXYGEN SATURATION: 98 % | HEART RATE: 84 BPM | HEIGHT: 63 IN | RESPIRATION RATE: 18 BRPM | SYSTOLIC BLOOD PRESSURE: 104 MMHG | BODY MASS INDEX: 29.41 KG/M2 | DIASTOLIC BLOOD PRESSURE: 84 MMHG | TEMPERATURE: 98 F | WEIGHT: 166 LBS

## 2018-11-13 DIAGNOSIS — R59.0 CERVICAL ADENOPATHY: ICD-10-CM

## 2018-11-13 DIAGNOSIS — L91.0 KELOID: Primary | ICD-10-CM

## 2018-11-13 DIAGNOSIS — I73.9 PVD (PERIPHERAL VASCULAR DISEASE) (HCC): ICD-10-CM

## 2018-11-13 DIAGNOSIS — I10 ESSENTIAL HYPERTENSION: ICD-10-CM

## 2018-11-13 DIAGNOSIS — Z79.4 TYPE 2 DIABETES MELLITUS WITH MICROALBUMINURIA, WITH LONG-TERM CURRENT USE OF INSULIN (HCC): ICD-10-CM

## 2018-11-13 DIAGNOSIS — E11.29 TYPE 2 DIABETES MELLITUS WITH MICROALBUMINURIA, WITH LONG-TERM CURRENT USE OF INSULIN (HCC): ICD-10-CM

## 2018-11-13 DIAGNOSIS — R80.9 TYPE 2 DIABETES MELLITUS WITH MICROALBUMINURIA, WITH LONG-TERM CURRENT USE OF INSULIN (HCC): ICD-10-CM

## 2018-11-13 PROCEDURE — G8599 NO ASA/ANTIPLAT THER USE RNG: HCPCS | Performed by: INTERNAL MEDICINE

## 2018-11-13 PROCEDURE — 2022F DILAT RTA XM EVC RTNOPTHY: CPT | Performed by: INTERNAL MEDICINE

## 2018-11-13 PROCEDURE — G8417 CALC BMI ABV UP PARAM F/U: HCPCS | Performed by: INTERNAL MEDICINE

## 2018-11-13 PROCEDURE — G8427 DOCREV CUR MEDS BY ELIG CLIN: HCPCS | Performed by: INTERNAL MEDICINE

## 2018-11-13 PROCEDURE — 99213 OFFICE O/P EST LOW 20 MIN: CPT | Performed by: INTERNAL MEDICINE

## 2018-11-13 PROCEDURE — G8484 FLU IMMUNIZE NO ADMIN: HCPCS | Performed by: INTERNAL MEDICINE

## 2018-11-13 PROCEDURE — 3017F COLORECTAL CA SCREEN DOC REV: CPT | Performed by: INTERNAL MEDICINE

## 2018-11-13 PROCEDURE — 3046F HEMOGLOBIN A1C LEVEL >9.0%: CPT | Performed by: INTERNAL MEDICINE

## 2018-11-13 PROCEDURE — 4004F PT TOBACCO SCREEN RCVD TLK: CPT | Performed by: INTERNAL MEDICINE

## 2018-11-13 RX ORDER — PROMETHAZINE HYDROCHLORIDE 25 MG/1
25 TABLET ORAL EVERY 6 HOURS PRN
Qty: 45 TABLET | Refills: 1 | Status: SHIPPED | OUTPATIENT
Start: 2018-11-13 | End: 2018-12-13 | Stop reason: SDUPTHER

## 2018-11-13 RX ORDER — FLUCONAZOLE 100 MG/1
100 TABLET ORAL DAILY
Qty: 7 TABLET | Refills: 2 | Status: SHIPPED | OUTPATIENT
Start: 2018-11-13 | End: 2018-11-20

## 2018-11-13 RX ORDER — CIPROFLOXACIN 250 MG/1
250 TABLET, FILM COATED ORAL 2 TIMES DAILY
Qty: 10 TABLET | Refills: 0 | Status: SHIPPED | OUTPATIENT
Start: 2018-11-13 | End: 2019-02-18 | Stop reason: ALTCHOICE

## 2018-11-13 NOTE — PROGRESS NOTES
Subjective:      Patient ID: Megan Lovett is a 66 y.o. female. ADIAR Harvey returns for a problem visit. She has developed another masslike effect over her right side of her neck. Where she had her previous incision. Review of Systems   Constitutional: Negative for activity change and fatigue. HENT: Negative for trouble swallowing and voice change. Respiratory: Negative for cough and shortness of breath. Cardiovascular: Negative for chest pain and leg swelling. Gastrointestinal: Negative for constipation, diarrhea, nausea and vomiting. Genitourinary: Negative for difficulty urinating and dysuria. Musculoskeletal: Positive for neck pain and neck stiffness. Negative for arthralgias and back pain. Neurological: Negative for dizziness and headaches. Objective:   Physical Exam   Neck:           Assessment:       Diagnosis Orders   1. [facility]- Papa Lambert MD   2. Type 2 diabetes mellitus with microalbuminuria, with long-term current use of insulin ([facility])     3. PVD (peripheral vascular disease) ([facility] [address]     4. Essential hypertension     5. Cervical adenopathy  CT SOFT TISSUE NECK WO CONTRAST          Plan:      Patient Instructions   We will order a CT of the neck. Follow-up after the CT scan results are back.             Toan Riggins, DO

## 2018-11-26 ENCOUNTER — HOSPITAL ENCOUNTER (OUTPATIENT)
Dept: GENERAL RADIOLOGY | Age: 63
Discharge: HOME | End: 2018-11-26
Payer: MEDICARE

## 2018-11-26 DIAGNOSIS — R59.0 CERVICAL ADENOPATHY: ICD-10-CM

## 2018-11-26 PROCEDURE — 70490 CT SOFT TISSUE NECK W/O DYE: CPT

## 2018-11-27 ENCOUNTER — OFFICE VISIT (OUTPATIENT)
Dept: FAMILY MEDICINE CLINIC | Age: 63
End: 2018-11-27
Payer: MEDICARE

## 2018-11-27 VITALS
BODY MASS INDEX: 29.8 KG/M2 | OXYGEN SATURATION: 98 % | DIASTOLIC BLOOD PRESSURE: 86 MMHG | SYSTOLIC BLOOD PRESSURE: 136 MMHG | HEART RATE: 81 BPM | HEIGHT: 63 IN | WEIGHT: 168.2 LBS | TEMPERATURE: 97 F

## 2018-11-27 DIAGNOSIS — E11.42 DIABETIC POLYNEUROPATHY ASSOCIATED WITH TYPE 2 DIABETES MELLITUS (HCC): Primary | ICD-10-CM

## 2018-11-27 PROCEDURE — 2022F DILAT RTA XM EVC RTNOPTHY: CPT | Performed by: INTERNAL MEDICINE

## 2018-11-27 PROCEDURE — G8599 NO ASA/ANTIPLAT THER USE RNG: HCPCS | Performed by: INTERNAL MEDICINE

## 2018-11-27 PROCEDURE — 99213 OFFICE O/P EST LOW 20 MIN: CPT | Performed by: INTERNAL MEDICINE

## 2018-11-27 PROCEDURE — 3017F COLORECTAL CA SCREEN DOC REV: CPT | Performed by: INTERNAL MEDICINE

## 2018-11-27 PROCEDURE — 3046F HEMOGLOBIN A1C LEVEL >9.0%: CPT | Performed by: INTERNAL MEDICINE

## 2018-11-27 PROCEDURE — G8417 CALC BMI ABV UP PARAM F/U: HCPCS | Performed by: INTERNAL MEDICINE

## 2018-11-27 PROCEDURE — G8484 FLU IMMUNIZE NO ADMIN: HCPCS | Performed by: INTERNAL MEDICINE

## 2018-11-27 PROCEDURE — 4004F PT TOBACCO SCREEN RCVD TLK: CPT | Performed by: INTERNAL MEDICINE

## 2018-11-27 PROCEDURE — G8427 DOCREV CUR MEDS BY ELIG CLIN: HCPCS | Performed by: INTERNAL MEDICINE

## 2018-11-27 RX ORDER — CLINDAMYCIN HYDROCHLORIDE 300 MG/1
300 CAPSULE ORAL 3 TIMES DAILY
Qty: 30 CAPSULE | Refills: 0 | Status: SHIPPED | OUTPATIENT
Start: 2018-11-27 | End: 2018-12-04 | Stop reason: SDUPTHER

## 2018-11-27 NOTE — PROGRESS NOTES
Subjective:      Patient ID: Kaity Rodriguez is a 66 y.o. female. ADAIR Gupta comes in  for diabetic shoe evaluation. She does have diminished sensation in several areas on both of her feet. Please see the outline below. Review of Systems   Constitutional: Negative for activity change and fatigue. Respiratory: Negative for cough and shortness of breath. Cardiovascular: Negative for chest pain and leg swelling. Gastrointestinal: Negative for constipation, diarrhea, nausea and vomiting. Genitourinary: Negative for difficulty urinating and dysuria. Musculoskeletal: Negative for arthralgias and back pain. Neurological: Positive for numbness. Negative for dizziness and headaches. Objective:   Physical Exam  Monofilament Exam Reveals:  Pulses: normal  Edema:normal  Skin Lesions:normal  Right Foot:    Left Foot:  Normal sensation at none   Normal sensation at none   Diminished sensation at 123   Diminished sensation at all   No sensation at 9    No sensation at all       Assessment:       Diagnosis Orders   1. Diabetic polyneuropathy associated with type 2 diabetes mellitus ([facility] [address]  Diabetic Shoe          Plan:      Patient Instructions   We will fax order for diabetic shoes to [facility] in [place]. Start clindamycin 300 mg three times a day for 10 days. Continue the remainder of your medications. Keep regular follow up.             Agustin Arteaga,

## 2018-11-28 DIAGNOSIS — I63.9 CEREBROVASCULAR ACCIDENT (CVA), UNSPECIFIED MECHANISM (HCC): Primary | ICD-10-CM

## 2018-11-30 ASSESSMENT — ENCOUNTER SYMPTOMS
VOICE CHANGE: 0
SHORTNESS OF BREATH: 0
COUGH: 0
NAUSEA: 0
VOMITING: 0
BACK PAIN: 0
DIARRHEA: 0
CONSTIPATION: 0
TROUBLE SWALLOWING: 0

## 2018-12-04 DIAGNOSIS — F41.9 ANXIETY: ICD-10-CM

## 2018-12-04 DIAGNOSIS — G89.4 CHRONIC PAIN SYNDROME: ICD-10-CM

## 2018-12-04 RX ORDER — CLINDAMYCIN HYDROCHLORIDE 300 MG/1
300 CAPSULE ORAL 3 TIMES DAILY
Qty: 30 CAPSULE | Refills: 0 | Status: SHIPPED | OUTPATIENT
Start: 2018-12-04 | End: 2018-12-14

## 2018-12-04 RX ORDER — HYDROCODONE BITARTRATE AND ACETAMINOPHEN 7.5; 325 MG/1; MG/1
1 TABLET ORAL EVERY 8 HOURS PRN
Qty: 90 TABLET | Refills: 0 | Status: SHIPPED | OUTPATIENT
Start: 2018-12-04 | End: 2019-01-02 | Stop reason: SDUPTHER

## 2018-12-04 RX ORDER — ALPRAZOLAM 0.5 MG/1
TABLET ORAL
Qty: 90 TABLET | Refills: 1 | Status: SHIPPED | OUTPATIENT
Start: 2018-12-04 | End: 2019-01-29 | Stop reason: SDUPTHER

## 2018-12-04 ASSESSMENT — ENCOUNTER SYMPTOMS
VOMITING: 0
COUGH: 0
DIARRHEA: 0
SHORTNESS OF BREATH: 0
NAUSEA: 0
CONSTIPATION: 0
BACK PAIN: 0

## 2018-12-12 DIAGNOSIS — R13.10 ABNORMAL SWALLOWING: Primary | ICD-10-CM

## 2018-12-13 RX ORDER — AMITRIPTYLINE HYDROCHLORIDE 10 MG/1
10 TABLET, FILM COATED ORAL NIGHTLY
Qty: 30 TABLET | Refills: 5 | OUTPATIENT
Start: 2018-12-13

## 2018-12-13 RX ORDER — PROMETHAZINE HYDROCHLORIDE 25 MG/1
TABLET ORAL
Qty: 45 TABLET | Refills: 1 | Status: SHIPPED | OUTPATIENT
Start: 2018-12-13 | End: 2019-02-08 | Stop reason: SDUPTHER

## 2018-12-13 RX ORDER — LISINOPRIL 10 MG/1
TABLET ORAL
Qty: 60 TABLET | Refills: 5 | OUTPATIENT
Start: 2018-12-13

## 2018-12-13 RX ORDER — DICYCLOMINE HYDROCHLORIDE 10 MG/1
CAPSULE ORAL
Qty: 120 CAPSULE | Refills: 5 | OUTPATIENT
Start: 2018-12-13

## 2019-01-02 DIAGNOSIS — G89.4 CHRONIC PAIN SYNDROME: ICD-10-CM

## 2019-01-02 RX ORDER — ATORVASTATIN CALCIUM 20 MG/1
40 TABLET, FILM COATED ORAL DAILY
Qty: 180 TABLET | Refills: 2 | Status: SHIPPED | OUTPATIENT
Start: 2019-01-02 | End: 2019-02-18 | Stop reason: ALTCHOICE

## 2019-01-02 RX ORDER — HYDROCODONE BITARTRATE AND ACETAMINOPHEN 7.5; 325 MG/1; MG/1
1 TABLET ORAL EVERY 8 HOURS PRN
Qty: 90 TABLET | Refills: 0 | Status: SHIPPED | OUTPATIENT
Start: 2019-01-02 | End: 2019-01-29 | Stop reason: SDUPTHER

## 2019-01-10 DIAGNOSIS — G62.9 NEUROPATHY: ICD-10-CM

## 2019-01-10 RX ORDER — GABAPENTIN 400 MG/1
CAPSULE ORAL
Qty: 180 CAPSULE | Refills: 3 | Status: SHIPPED | OUTPATIENT
Start: 2019-01-10 | End: 2019-04-22 | Stop reason: SDUPTHER

## 2019-01-10 RX ORDER — ESCITALOPRAM OXALATE 10 MG/1
TABLET ORAL
Qty: 30 TABLET | Refills: 3 | Status: SHIPPED | OUTPATIENT
Start: 2019-01-10 | End: 2019-09-18 | Stop reason: SDUPTHER

## 2019-01-11 ENCOUNTER — OFFICE VISIT (OUTPATIENT)
Dept: FAMILY MEDICINE CLINIC | Age: 64
End: 2019-01-11
Payer: MEDICARE

## 2019-01-11 VITALS
SYSTOLIC BLOOD PRESSURE: 138 MMHG | BODY MASS INDEX: 30.58 KG/M2 | TEMPERATURE: 96.3 F | HEART RATE: 103 BPM | OXYGEN SATURATION: 95 % | WEIGHT: 172.6 LBS | HEIGHT: 63 IN | DIASTOLIC BLOOD PRESSURE: 88 MMHG

## 2019-01-11 DIAGNOSIS — Z79.4 TYPE 2 DIABETES MELLITUS WITH MICROALBUMINURIA, WITH LONG-TERM CURRENT USE OF INSULIN (HCC): ICD-10-CM

## 2019-01-11 DIAGNOSIS — E11.29 TYPE 2 DIABETES MELLITUS WITH MICROALBUMINURIA, WITH LONG-TERM CURRENT USE OF INSULIN (HCC): ICD-10-CM

## 2019-01-11 DIAGNOSIS — I73.9 PVD (PERIPHERAL VASCULAR DISEASE) (HCC): ICD-10-CM

## 2019-01-11 DIAGNOSIS — R80.9 TYPE 2 DIABETES MELLITUS WITH MICROALBUMINURIA, WITH LONG-TERM CURRENT USE OF INSULIN (HCC): ICD-10-CM

## 2019-01-11 DIAGNOSIS — E11.42 DIABETIC POLYNEUROPATHY ASSOCIATED WITH TYPE 2 DIABETES MELLITUS (HCC): Primary | ICD-10-CM

## 2019-01-11 DIAGNOSIS — Z72.0 TOBACCO USE: ICD-10-CM

## 2019-01-11 LAB — HBA1C MFR BLD: 10.4 %

## 2019-01-11 PROCEDURE — 99214 OFFICE O/P EST MOD 30 MIN: CPT | Performed by: INTERNAL MEDICINE

## 2019-01-11 PROCEDURE — 83036 HEMOGLOBIN GLYCOSYLATED A1C: CPT | Performed by: INTERNAL MEDICINE

## 2019-01-11 RX ORDER — NICOTINE 21 MG/24HR
1 PATCH, TRANSDERMAL 24 HOURS TRANSDERMAL EVERY 24 HOURS
Qty: 30 PATCH | Refills: 3 | Status: SHIPPED | OUTPATIENT
Start: 2019-01-11 | End: 2020-12-08

## 2019-01-11 RX ORDER — FAMOTIDINE 40 MG/1
40 TABLET, FILM COATED ORAL DAILY
COMMUNITY
Start: 2019-01-10 | End: 2020-12-08

## 2019-01-11 RX ORDER — ESOMEPRAZOLE MAGNESIUM 40 MG/1
CAPSULE, DELAYED RELEASE ORAL
COMMUNITY
Start: 2019-01-10 | End: 2019-07-15 | Stop reason: SDUPTHER

## 2019-01-11 RX ORDER — CALCIFEDIOL 30 UG/1
CAPSULE, EXTENDED RELEASE ORAL
COMMUNITY
Start: 2019-01-09 | End: 2020-12-08

## 2019-01-11 RX ORDER — ASPIRIN 325 MG
325 TABLET ORAL DAILY
Qty: 30 TABLET | Refills: 3 | Status: SHIPPED | OUTPATIENT
Start: 2019-01-11 | End: 2021-02-24 | Stop reason: SDUPTHER

## 2019-01-11 ASSESSMENT — ENCOUNTER SYMPTOMS
RHINORRHEA: 1
NAUSEA: 0
DIARRHEA: 0
COUGH: 1
CONSTIPATION: 0
VOMITING: 0
SHORTNESS OF BREATH: 0
BACK PAIN: 0

## 2019-01-22 ENCOUNTER — TELEPHONE (OUTPATIENT)
Dept: UROLOGY | Facility: CLINIC | Age: 64
End: 2019-01-22

## 2019-01-22 DIAGNOSIS — Q64.9 URINARY ANOMALY: Primary | ICD-10-CM

## 2019-01-22 NOTE — TELEPHONE ENCOUNTER
Patient of Dr Adams last seen 08/2016 calls for appt regarding having a lot of air come out along with her urine. She recently saw Dr Josué Snyder and he suggested she see urologist. I have called and asked for her last office note to be faxed to us.

## 2019-01-23 NOTE — TELEPHONE ENCOUNTER
Spoke with patient and made appt for 3/8/19 which was the first available open appt. She requested to be put on wait list for a possible cancellation opening and I have done that.

## 2019-01-29 DIAGNOSIS — G89.4 CHRONIC PAIN SYNDROME: ICD-10-CM

## 2019-01-29 DIAGNOSIS — F41.9 ANXIETY: ICD-10-CM

## 2019-01-29 RX ORDER — DILTIAZEM HYDROCHLORIDE 180 MG/1
CAPSULE, EXTENDED RELEASE ORAL
Qty: 30 CAPSULE | Refills: 5 | Status: SHIPPED | OUTPATIENT
Start: 2019-01-29 | End: 2020-12-08

## 2019-01-29 RX ORDER — HYDROCODONE BITARTRATE AND ACETAMINOPHEN 7.5; 325 MG/1; MG/1
1 TABLET ORAL EVERY 8 HOURS PRN
Qty: 90 TABLET | Refills: 0 | Status: SHIPPED | OUTPATIENT
Start: 2019-01-29 | End: 2019-02-26 | Stop reason: SDUPTHER

## 2019-01-29 RX ORDER — LEVOTHYROXINE SODIUM 0.03 MG/1
25 TABLET ORAL DAILY
Qty: 90 TABLET | Refills: 1 | Status: SHIPPED | OUTPATIENT
Start: 2019-01-29 | End: 2019-12-16 | Stop reason: SDUPTHER

## 2019-01-29 RX ORDER — FUROSEMIDE 40 MG/1
TABLET ORAL
Qty: 60 TABLET | Refills: 5 | Status: SHIPPED | OUTPATIENT
Start: 2019-01-29 | End: 2019-11-20 | Stop reason: SDUPTHER

## 2019-01-29 RX ORDER — ALPRAZOLAM 0.5 MG/1
TABLET ORAL
Qty: 90 TABLET | Refills: 1 | Status: SHIPPED | OUTPATIENT
Start: 2019-01-29 | End: 2019-03-25

## 2019-01-29 RX ORDER — OMEPRAZOLE 20 MG/1
20 CAPSULE, DELAYED RELEASE ORAL DAILY
Qty: 30 CAPSULE | Refills: 5 | Status: SHIPPED | OUTPATIENT
Start: 2019-01-29 | End: 2020-12-08

## 2019-02-06 RX ORDER — DULAGLUTIDE 0.75 MG/.5ML
INJECTION, SOLUTION SUBCUTANEOUS
Qty: 2 ML | Refills: 4 | Status: SHIPPED | OUTPATIENT
Start: 2019-02-06 | End: 2019-07-12 | Stop reason: SDUPTHER

## 2019-02-08 DIAGNOSIS — F41.9 ANXIETY: ICD-10-CM

## 2019-02-12 RX ORDER — ALPRAZOLAM 1 MG/1
TABLET ORAL
Qty: 30 TABLET | Refills: 3 | Status: SHIPPED | OUTPATIENT
Start: 2019-02-12 | End: 2019-03-25 | Stop reason: SDUPTHER

## 2019-02-12 RX ORDER — INSULIN DEGLUDEC 200 U/ML
INJECTION, SOLUTION SUBCUTANEOUS
Qty: 18 ML | Refills: 5 | Status: SHIPPED | OUTPATIENT
Start: 2019-02-12 | End: 2021-10-04 | Stop reason: SDUPTHER

## 2019-02-12 RX ORDER — INSULIN ASPART 100 [IU]/ML
INJECTION, SOLUTION INTRAVENOUS; SUBCUTANEOUS
Qty: 15 ML | Refills: 3 | Status: SHIPPED | OUTPATIENT
Start: 2019-02-12 | End: 2021-10-04 | Stop reason: SDUPTHER

## 2019-02-12 RX ORDER — PROMETHAZINE HYDROCHLORIDE 25 MG/1
TABLET ORAL
Qty: 45 TABLET | Refills: 1 | Status: SHIPPED | OUTPATIENT
Start: 2019-02-12 | End: 2020-12-08

## 2019-02-12 RX ORDER — ATORVASTATIN CALCIUM 40 MG/1
TABLET, FILM COATED ORAL
Qty: 30 TABLET | Refills: 5 | Status: SHIPPED | OUTPATIENT
Start: 2019-02-12 | End: 2020-09-08 | Stop reason: SDUPTHER

## 2019-02-18 ENCOUNTER — OFFICE VISIT (OUTPATIENT)
Dept: FAMILY MEDICINE CLINIC | Age: 64
End: 2019-02-18
Payer: MEDICARE

## 2019-02-18 VITALS
WEIGHT: 168 LBS | DIASTOLIC BLOOD PRESSURE: 80 MMHG | HEIGHT: 63 IN | BODY MASS INDEX: 29.77 KG/M2 | TEMPERATURE: 97.9 F | OXYGEN SATURATION: 98 % | HEART RATE: 112 BPM | SYSTOLIC BLOOD PRESSURE: 130 MMHG | RESPIRATION RATE: 20 BRPM

## 2019-02-18 DIAGNOSIS — N76.4 ABSCESS OF GENITAL LABIA: Primary | ICD-10-CM

## 2019-02-18 PROCEDURE — 99213 OFFICE O/P EST LOW 20 MIN: CPT | Performed by: NURSE PRACTITIONER

## 2019-02-18 RX ORDER — SULFAMETHOXAZOLE AND TRIMETHOPRIM 800; 160 MG/1; MG/1
1 TABLET ORAL 2 TIMES DAILY
Qty: 20 TABLET | Refills: 0 | Status: SHIPPED | OUTPATIENT
Start: 2019-02-18 | End: 2019-02-28

## 2019-02-26 DIAGNOSIS — G89.4 CHRONIC PAIN SYNDROME: ICD-10-CM

## 2019-02-26 RX ORDER — MONTELUKAST SODIUM 10 MG/1
10 TABLET ORAL NIGHTLY
Qty: 30 TABLET | Refills: 0 | Status: SHIPPED | OUTPATIENT
Start: 2019-02-26 | End: 2020-12-08

## 2019-02-26 RX ORDER — HYDROCODONE BITARTRATE AND ACETAMINOPHEN 7.5; 325 MG/1; MG/1
1 TABLET ORAL EVERY 8 HOURS PRN
Qty: 90 TABLET | Refills: 0 | Status: SHIPPED | OUTPATIENT
Start: 2019-02-26 | End: 2019-03-25 | Stop reason: SDUPTHER

## 2019-03-25 ENCOUNTER — OFFICE VISIT (OUTPATIENT)
Dept: FAMILY MEDICINE CLINIC | Age: 64
End: 2019-03-25
Payer: MEDICARE

## 2019-03-25 VITALS
WEIGHT: 167 LBS | DIASTOLIC BLOOD PRESSURE: 76 MMHG | HEIGHT: 63 IN | TEMPERATURE: 98 F | BODY MASS INDEX: 29.59 KG/M2 | OXYGEN SATURATION: 96 % | RESPIRATION RATE: 16 BRPM | SYSTOLIC BLOOD PRESSURE: 130 MMHG | HEART RATE: 102 BPM

## 2019-03-25 DIAGNOSIS — E11.65 TYPE 2 DIABETES MELLITUS WITH HYPERGLYCEMIA, WITH LONG-TERM CURRENT USE OF INSULIN (HCC): ICD-10-CM

## 2019-03-25 DIAGNOSIS — Z79.4 TYPE 2 DIABETES MELLITUS WITH HYPERGLYCEMIA, WITH LONG-TERM CURRENT USE OF INSULIN (HCC): ICD-10-CM

## 2019-03-25 DIAGNOSIS — E78.5 HYPERLIPIDEMIA, UNSPECIFIED HYPERLIPIDEMIA TYPE: ICD-10-CM

## 2019-03-25 DIAGNOSIS — I10 ESSENTIAL HYPERTENSION: ICD-10-CM

## 2019-03-25 DIAGNOSIS — F41.9 ANXIETY: ICD-10-CM

## 2019-03-25 DIAGNOSIS — G89.4 CHRONIC PAIN SYNDROME: Primary | ICD-10-CM

## 2019-03-25 PROCEDURE — 99215 OFFICE O/P EST HI 40 MIN: CPT | Performed by: FAMILY MEDICINE

## 2019-03-25 RX ORDER — ALPRAZOLAM 1 MG/1
TABLET ORAL
Qty: 60 TABLET | Refills: 2 | Status: SHIPPED | OUTPATIENT
Start: 2019-03-25 | End: 2019-06-24 | Stop reason: SDUPTHER

## 2019-03-25 RX ORDER — HYDROCODONE BITARTRATE AND ACETAMINOPHEN 7.5; 325 MG/1; MG/1
1 TABLET ORAL EVERY 8 HOURS PRN
Qty: 90 TABLET | Refills: 0 | Status: SHIPPED | OUTPATIENT
Start: 2019-03-25 | End: 2019-04-22 | Stop reason: SDUPTHER

## 2019-03-25 ASSESSMENT — PATIENT HEALTH QUESTIONNAIRE - PHQ9
SUM OF ALL RESPONSES TO PHQ9 QUESTIONS 1 & 2: 1
SUM OF ALL RESPONSES TO PHQ QUESTIONS 1-9: 1
2. FEELING DOWN, DEPRESSED OR HOPELESS: 1
SUM OF ALL RESPONSES TO PHQ QUESTIONS 1-9: 1
1. LITTLE INTEREST OR PLEASURE IN DOING THINGS: 0

## 2019-03-25 NOTE — PROGRESS NOTES
Jericho Marshall is a 66 y.o. female who presents today for   Chief Complaint   Patient presents with   [facility] Patient     dr Marin Wills pt    Anxiety    Back Pain    Hip Pain    Peripheral Neuropathy    Medication Refill       Chief Complaint: Establish care    HPI  Patient reports that she has a history of low back pain, hip pain, and leg pain for which she is taking Norco. She reports that the medicine is beneficial to her symptoms and denies any issues with that use. She states that without the medicine she has no problems with her pain and finds it more difficult to function however with the use the medicine she is able do things that she needs to do. She does also have some issues with neuropathy for which she takes Neurontin and is beneficial to her symptoms. She denies any problems with the use of medicine does state that it may help some of her other pain issues as well. She does also have history of anxiety for which she is taking Lexapro as well as using Xanax. She reports that Lexapro does seem to improve her anxiety she denies any issues with the medicine. She states that she is currently taking the Xanax 3 times a day but is taking a half milligrams in the morning and afternoon and a full milligram at bedtime. She states that this regimen has been an official to her symptoms without causing her any problems. She's currently taking Elavil which she's not really sure why she is taking it. Discussed potential uses of medicine and she is still unsure exactly why she is taking it or if it is beneficial.    She does have a history of type II diabetes for which she is taking Synjardy XR. She is also using Humalog in a sliding scale fashion. In addition to these medications she is also taking Tarceva 100 units daily as well as Trulicity. She reports that her diabetes is uncontrolled and she is following with Dr. Polly Goldberg, endocrinology in [facility].     She has history of hyperlipidemia for which she is taking Lipitor. She denies any new myalgias or GI upsets with use of medicine. She does attempt to watch her diet and tries to stay physically active though is limited By Her Chronic Pain Issues. She Does Also Have a History of Arterial Hypertension That She Reports Is Doing Well with Her Current Medications. She denies any symptoms from abnormal blood pressures and denies any side effects from the use of her medications. She does attempt avoid excess salt intake. Review of Systems   Constitutional: Negative for activity change, appetite change, fatigue and fever. HENT: Negative for congestion and rhinorrhea. Eyes: Negative for pain and discharge. Respiratory: Negative for cough and shortness of breath. Cardiovascular: Negative for chest pain and palpitations. Gastrointestinal: Negative for abdominal pain, constipation, diarrhea, nausea and vomiting. Endocrine: Negative for cold intolerance and heat intolerance. Genitourinary: Negative for dysuria and hematuria. Musculoskeletal: Positive for arthralgias and back pain. Negative for gait problem and neck pain. Skin: Negative for rash and wound. Neurological: Negative for syncope and weakness. Hematological: Negative for adenopathy. Does not bruise/bleed easily. Psychiatric/Behavioral: Negative for dysphoric mood. The patient is nervous/anxious. Past Medical History:   Diagnosis Date    Anxiety     Arthritis     Bilateral cataracts     Chronic kidney disease     [facility] obstructive pulmonary disease) ([facility] [address]     Diabetes mellitus ([facility] [address]     Diabetes mellitus ([facility] [address]     Hyperlipidemia     Cholesterol management per pcp.      Hypertension     IBS (irritable bowel syndrome)     Neuropathy     Pericardial effusion     Peripheral vascular disease (HCC)     Smoker     Type 2 diabetes mellitus without complication (HCC)        Current Outpatient Medications   Medication Sig Dispense Refill    ALPRAZolam [place]ue [place]) 1 MG tablet 1/2-1 tablet 3 times per day as need. 60 tablet 2    HYDROcodone-acetaminophen (NORCO) 7.5-325 MG per tablet Take 1 tablet by mouth every 8 hours as needed for Pain. 90 tablet 0    insulin lispro (HUMALOG KWIKPEN) 100 UNIT/ML pen Inject 40 Units into the skin 3 times daily (before meals) 5 pen 3    montelukast (SINGULAIR) 10 MG tablet Take 1 tablet by mouth nightly 30 tablet 0    promethazine (PHENERGAN) 25 MG tablet TAKE 1 TABLET BY MOUTH EVERY 6 HOURS AS NEEDED FOR NAUSEA AND VOMITING 45 tablet 1    atorvastatin (LIPITOR) 40 MG tablet TAKE 1 TABLET BY MOUTH 1 TIME DAILY 30 tablet 5    NOVOLOG FLEXPEN 100 UNIT/ML injection pen INJECT UNDER THE SKIN 40 UNITS THREE TIMES A DAY BEFORE MEALS 15 mL 3    TRESIBA FLEXTOUCH 200 UNIT/ML SOPN INJECT 100 UNITS SUBQ IN THE EVENING 18 mL 5    diltiazem (DILT-XR) 180 MG extended release capsule TAKE 1 CAPSULE BY MOUTH 1 TIME DAILY 30 capsule 5    furosemide (LASIX) 40 MG tablet TAKE 1 TABLET BY MOUTH TWO TIMES A DAY 60 tablet 5    omeprazole (PRILOSEC) 20 MG delayed release capsule Take 1 capsule by mouth daily 30 capsule 5    levothyroxine (SYNTHROID) 25 MCG tablet Take 1 tablet by mouth daily 90 tablet 1    RAYALDEE 30 MCG CPCR       esomeprazole (NEXIUM) 40 MG delayed release capsule       famotidine (PEPCID) 40 MG tablet Take 40 mg by mouth daily       aspirin (KSENIA ASPIRIN) 325 MG tablet Take 1 tablet by mouth daily 30 tablet 3    nicotine (NICODERM CQ) 14 MG/24HR Place 1 patch onto the skin every 24 hours 30 patch 3    escitalopram (LEXAPRO) 10 MG tablet TAKE 1 TABLET BY MOUTH 1 TIME DAILY 30 tablet 3    gabapentin (NEURONTIN) 400 MG capsule TAKE TWO CAPSULES THREE TIMES A  capsule 3    Diabetic Shoe MISC by Does not apply route I pair of shoes with 3 pair of heat molded inserts.  1 each 0    sucralfate (CARAFATE) 1 GM tablet Take 1 tablet by mouth 4 times daily 120 tablet 5    amitriptyline (ELAVIL) 10 MG tablet Take 1 Mother     Cancer Father     Heart Failure Other     Heart Failure Maternal Grandfather        /76 (Site: Right Upper Arm, Position: Sitting, Cuff Size: Medium Adult)   Pulse 102   Temp 98 °F (36.7 °C) (Oral)   Resp 16   Ht 5' 3\" (1.6 m)   Wt 167 lb (75.8 kg)   SpO2 96%   BMI 29.58 kg/m²     Physical Exam   Constitutional: She is oriented to person, place, and time. She appears well-developed and well-nourished. No distress. HENT:   Head: Normocephalic and atraumatic. Right Ear: External ear normal.   Left Ear: External ear normal.   Nose: Nose normal.   Eyes: Conjunctivae and EOM are normal. Right eye exhibits no discharge. Left eye exhibits no discharge. No scleral icterus. Neck: Neck supple. No tracheal deviation present. No thyromegaly present. Cardiovascular: Normal rate, regular rhythm, normal heart sounds and intact distal pulses. Exam reveals no gallop and no friction rub. Pulmonary/Chest: Effort normal and breath sounds normal. No respiratory distress. She has no wheezes. She has no rales. Abdominal: Soft. Bowel sounds are normal. She exhibits no distension. There is no tenderness. Musculoskeletal: She exhibits no edema (Bilateral lower extremities) or deformity (No gross deformities of upper or lower extremities). Lymphadenopathy:     She has no cervical adenopathy. Neurological: She is alert and oriented to person, place, and time. No cranial nerve deficit. Skin: Skin is warm and dry. No rash noted. She is not diaphoretic. No erythema. Psychiatric: Her behavior is normal. Thought content normal.   Nursing note and vitals reviewed. Assessment:       ICD-10-CM    1. Chronic pain syndrome G89.4 HYDROcodone-acetaminophen (NORCO) 7.5-325 MG per tablet    Discussed proper use of medication and stress importance of as needed use only and taking lowest possible dose required to manage her symptoms. Discussed long term planning of the medication.  With options presented moving forward patient is unsure as to what path she is willing/wanting to take. Options discussed were gradually weaning off of the medicine at which time if pain became problematic discussing alternative medications to further assist her pain control, involving pain management and management of her chronic pain issues, or discussed possibility of patient that I was not a good fit for her due to my choice not to prescribe opioid pain medicines of this class in a long-term fashion for noncancer-related pain that I would support her decision to seek out another PCP that might be willing to do so. At this time she is not sure which route she is wanting to go and with that in mind discussed the possibility of providing her with a 1 month prescription with the understanding that in one month when she follows up a decision would need to be made for future planning of management of her chronic pain. 2. Anxiety F41.9 ALPRAZolam (XANAX) 1 MG tablet    Discussed patient medications and concerns with the combination of opioid and benzo together and the risk they pose including, but not limited to, the increased risk of death. Discussed physical addiction concerns with regular scheduled fashion usage of benzodiazepines and recommended a gradual tapering of her dose to get to an as needed fashion only and discuss the possibility of further medication adjustments in the future especially if she is going to be continuing with opioid pain medications and efforts to reduce her risk. With improvement she is feeling that she is getting with the use the medicine will continue at this time with efforts to get to an as needed use only and continue to discuss options moving forward. 3. Type 2 diabetes mellitus with hyperglycemia, with long-term current use of insulin (HCC) E11.65 Discussed importance of DM diet and benefits of exercise. Discussed proper use of medication. Stressed proper foot care and monitoring.   Discussed the importance of yearly eye exams. Stress importance of keeping appointment with endocrinology, Rito Post. We'll continue to monitor and assist as needed. Z79.4    4. Essential hypertension I10 Controlled with current medications. We'll continue to monitor and adjust as course dictates. 5. Hyperlipidemia, unspecified hyperlipidemia type E78.5 Tolerating Lipitor. We'll continue this time and continue to monitor. Plan: With patient not entirely sure while she is taking Elavil and reporting a lack of benefit from the medicine that she can tell discussed the possibility of gradually weaning off the medicine in efforts to see if it makes a difference and patient was agreeable with this plan. Discussed proper weaning process. Discussed proper use of medication. Discussed signs and symptoms requiring medical attention. All questions were answered and patient voiced understanding and agreement with plan as discussed. No orders of the defined types were placed in this encounter. Orders Placed This Encounter   Medications    ALPRAZolam (XANAX) 1 MG tablet     Si/2-1 tablet 3 times per day as need. Dispense:  60 tablet     Refill:  2    HYDROcodone-acetaminophen (NORCO) 7.5-325 MG per tablet     Sig: Take 1 tablet by mouth every 8 hours as needed for Pain. Dispense:  90 tablet     Refill:  0     Reduce doses taken as pain becomes manageable     Medications Discontinued During This Encounter   Medication Reason    metFORMIN (GLUCOPHAGE) 1000 MG tablet     ALPRAZolam (XANAX) 0.5 MG tablet     ALPRAZolam (XANAX) 1 MG tablet REORDER    HYDROcodone-acetaminophen (NORCO) 7.5-325 MG per tablet REORDER     Patient Instructions   Patient given educational handouts and has had all questions answered. Patient voices understanding and agrees to plans along with risks and benefits of plan. Patient is instructed to continue prior meds,diet, and exercise plans as instructed.  Patient agrees to follow up as instructed and sooner if needed. Patient agrees to go to ER if condition becomes emergent. Return in about 1 month (around 4/22/2019). More than 50% of the time was spent counseling and coordinating care for a total time of greater than 40 min face to face.

## 2019-04-02 ENCOUNTER — TELEPHONE (OUTPATIENT)
Dept: FAMILY MEDICINE CLINIC | Age: 64
End: 2019-04-02

## 2019-04-03 ENCOUNTER — TELEPHONE (OUTPATIENT)
Dept: FAMILY MEDICINE CLINIC | Age: 64
End: 2019-04-03

## 2019-04-03 DIAGNOSIS — B37.31 VAGINAL YEAST INFECTION: Primary | ICD-10-CM

## 2019-04-03 RX ORDER — FLUCONAZOLE 150 MG/1
150 TABLET ORAL ONCE
Qty: 2 TABLET | Refills: 0 | Status: SHIPPED | OUTPATIENT
Start: 2019-04-03 | End: 2019-04-03

## 2019-04-03 NOTE — TELEPHONE ENCOUNTER
Patient is requesting diflucan for yeast infection caused by DM medication. She also wanted you to know that she cannot get in to see Dr. Tc Noland until May 9th.

## 2019-04-10 ENCOUNTER — TELEPHONE (OUTPATIENT)
Dept: FAMILY MEDICINE CLINIC | Age: 64
End: 2019-04-10

## 2019-04-10 NOTE — TELEPHONE ENCOUNTER
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 She was told to ask you if it was okay to discontinue 325 mg aspirin daily with recent stroke history.

## 2019-04-11 ENCOUNTER — TELEPHONE (OUTPATIENT)
Dept: FAMILY MEDICINE CLINIC | Age: 64
End: 2019-04-11

## 2019-04-11 DIAGNOSIS — E11.42 TYPE 2 DIABETES MELLITUS WITH DIABETIC POLYNEUROPATHY, WITH LONG-TERM CURRENT USE OF INSULIN (HCC): Primary | ICD-10-CM

## 2019-04-11 DIAGNOSIS — Z79.4 TYPE 2 DIABETES MELLITUS WITH DIABETIC POLYNEUROPATHY, WITH LONG-TERM CURRENT USE OF INSULIN (HCC): Primary | ICD-10-CM

## 2019-04-11 ASSESSMENT — ENCOUNTER SYMPTOMS
VOMITING: 0
ABDOMINAL PAIN: 0
NAUSEA: 0
SHORTNESS OF BREATH: 0
EYE PAIN: 0
EYE DISCHARGE: 0
COUGH: 0
CONSTIPATION: 0
BACK PAIN: 1
DIARRHEA: 0
RHINORRHEA: 0

## 2019-04-11 NOTE — TELEPHONE ENCOUNTER
Novolog is not covered by patient's insurance. Humalog quick pen is, if you would like to resubmit. Or you can have Noelle attempt a PA on the Novolog.

## 2019-04-12 NOTE — PATIENT INSTRUCTIONS
Patient Education        Learning About Diabetes Food Guidelines  Your Care Instructions    Meal planning is important to manage diabetes. It helps keep your blood sugar at a target level (which you set with your doctor). You don't have to eat special foods. You can eat what your family eats, including sweets once in a while. But you do have to pay attention to how often you eat and how much you eat of certain foods. You may want to work with a dietitian or a certified diabetes educator (CDE) to help you plan meals and snacks. A dietitian or CDE can also help you lose weight if that is one of your goals. What should you know about eating carbs? Managing the amount of carbohydrate (carbs) you eat is an important part of healthy meals when you have diabetes. Carbohydrate is found in many foods. · Learn which foods have carbs. And learn the amounts of carbs in different foods. ? Bread, cereal, pasta, and rice have about 15 grams of carbs in a serving. A serving is 1 slice of bread (1 ounce), ½ cup of cooked cereal, or 1/3 cup of cooked pasta or rice. ? Fruits have 15 grams of carbs in a serving. A serving is 1 small fresh fruit, such as an apple or orange; ½ of a banana; ½ cup of cooked or canned fruit; ½ cup of fruit juice; 1 cup of melon or raspberries; or 2 tablespoons of dried fruit. ? Milk and no-sugar-added yogurt have 15 grams of carbs in a serving. A serving is 1 cup of milk or 2/3 cup of no-sugar-added yogurt. ? Starchy vegetables have 15 grams of carbs in a serving. A serving is ½ cup of mashed potatoes or sweet potato; 1 cup winter squash; ½ of a small baked potato; ½ cup of cooked beans; or ½ cup cooked corn or green peas. · Learn how much carbs to eat each day and at each meal. A dietitian or CDE can teach you how to keep track of the amount of carbs you eat. This is called carbohydrate counting. · If you are not sure how to count carbohydrate grams, use the Plate Method to plan meals.  It is a good, quick way to make sure that you have a balanced meal. It also helps you spread carbs throughout the day. ? Divide your plate by types of foods. Put non-starchy vegetables on half the plate, meat or other protein food on one-quarter of the plate, and a grain or starchy vegetable in the final quarter of the plate. To this you can add a small piece of fruit and 1 cup of milk or yogurt, depending on how many carbs you are supposed to eat at a meal.  · Try to eat about the same amount of carbs at each meal. Do not \"save up\" your daily allowance of carbs to eat at one meal.  · Proteins have very little or no carbs per serving. Examples of proteins are beef, chicken, turkey, fish, eggs, tofu, cheese, cottage cheese, and peanut butter. A serving size of meat is 3 ounces, which is about the size of a deck of cards. Examples of meat substitute serving sizes (equal to 1 ounce of meat) are 1/4 cup of cottage cheese, 1 egg, 1 tablespoon of peanut butter, and ½ cup of tofu. How can you eat out and still eat healthy? · Learn to estimate the serving sizes of foods that have carbohydrate. If you measure food at home, it will be easier to estimate the amount in a serving of restaurant food. · If the meal you order has too much carbohydrate (such as potatoes, corn, or baked beans), ask to have a low-carbohydrate food instead. Ask for a salad or green vegetables. · If you use insulin, check your blood sugar before and after eating out to help you plan how much to eat in the future. · If you eat more carbohydrate at a meal than you had planned, take a walk or do other exercise. This will help lower your blood sugar. What else should you know? · Limit saturated fat, such as the fat from meat and dairy products. This is a healthy choice because people who have diabetes are at higher risk of heart disease. So choose lean cuts of meat and nonfat or low-fat dairy products.  Use olive or canola oil instead of butter or shortening when cooking. · Don't skip meals. Your blood sugar may drop too low if you skip meals and take insulin or certain medicines for diabetes. · Check with your doctor before you drink alcohol. Alcohol can cause your blood sugar to drop too low. Alcohol can also cause a bad reaction if you take certain diabetes medicines. Follow-up care is a key part of your treatment and safety. Be sure to make and go to all appointments, and call your doctor if you are having problems. It's also a good idea to know your test results and keep a list of the medicines you take. Where can you learn more? Go to https://[facility]. org and sign in to your [employer] account. Enter A588 in the [employer] box to learn more about \"Learning About Diabetes Food Guidelines. \"     If you do not have an account, please click on the \"Sign Up Now\" link. Current as of: July 25, 2018  Content Version: 11.9  © 1571-4924 [employer]. Care instructions adapted under license by [facility] ([facility]). If you have questions about a medical condition or this instruction, always ask your healthcare professional. [place] [address] any warranty or liability for your use of this information.

## 2019-04-22 ENCOUNTER — OFFICE VISIT (OUTPATIENT)
Dept: FAMILY MEDICINE CLINIC | Age: 64
End: 2019-04-22
Payer: MEDICARE

## 2019-04-22 VITALS
HEART RATE: 94 BPM | OXYGEN SATURATION: 97 % | BODY MASS INDEX: 29.41 KG/M2 | WEIGHT: 166 LBS | TEMPERATURE: 98.3 F | SYSTOLIC BLOOD PRESSURE: 140 MMHG | RESPIRATION RATE: 14 BRPM | DIASTOLIC BLOOD PRESSURE: 88 MMHG | HEIGHT: 63 IN

## 2019-04-22 DIAGNOSIS — G89.4 CHRONIC PAIN SYNDROME: Primary | ICD-10-CM

## 2019-04-22 DIAGNOSIS — G62.9 NEUROPATHY: ICD-10-CM

## 2019-04-22 DIAGNOSIS — F41.9 ANXIETY: ICD-10-CM

## 2019-04-22 DIAGNOSIS — D11.9 WARTHIN TUMOR: ICD-10-CM

## 2019-04-22 PROCEDURE — 99214 OFFICE O/P EST MOD 30 MIN: CPT | Performed by: FAMILY MEDICINE

## 2019-04-22 RX ORDER — GABAPENTIN 400 MG/1
800 CAPSULE ORAL 3 TIMES DAILY
Qty: 180 CAPSULE | Refills: 1 | Status: SHIPPED | OUTPATIENT
Start: 2019-04-22 | End: 2022-04-27

## 2019-04-22 RX ORDER — HYDROCODONE BITARTRATE AND ACETAMINOPHEN 7.5; 325 MG/1; MG/1
1 TABLET ORAL EVERY 8 HOURS PRN
Qty: 90 TABLET | Refills: 0 | Status: SHIPPED | OUTPATIENT
Start: 2019-04-22 | End: 2020-04-21

## 2019-04-22 ASSESSMENT — ENCOUNTER SYMPTOMS
BACK PAIN: 1
DIARRHEA: 0
RHINORRHEA: 0
NAUSEA: 0
EYE DISCHARGE: 0
COUGH: 0
CONSTIPATION: 0
EYE PAIN: 0
ABDOMINAL PAIN: 0
SHORTNESS OF BREATH: 0
VOMITING: 0

## 2019-04-22 NOTE — PROGRESS NOTES
Josie Saeed is a 66 y.o. female who presents today for   Chief Complaint   Patient presents with    1 Month Follow-Up    Wound Check     tumors on right side of neck    Peripheral Neuropathy    Hip Pain    Back Pain    Shoulder Pain       Chief Complaint: Back pain    HPI  Patient has a history of chronic back pain and is currently taking Norco. Previously we discussed the long-term planning of the prescription and she has been considering her options and has decided that she would like to involve pain management in her care for long-term management of her chronic pain issues. She states that the [facility] [place] does well for her pain without causing any problems. She states that the medicine allows her to do the things that she needs to do. She does have a history of anxiety and is currently taking Xanax. She denies any issues with the use of medicine and reports it is beneficial for anxiety symptoms. She does have a history of peripheral neuropathy and is taking Neurontin. She states that the Neurontin does well for her symptoms without causing any problems. She states that without the medicine she has significant issues with her leg pains/nerve pain. She did recently have 2 warthin tumors removed off her right neck by Dr. Ata Neley in [facility]. She reports that she is doing well but is here for wound check. She does have an appointment later this week with her specialist for evaluation and suture removal.    Review of Systems   Constitutional: Negative for activity change, appetite change, fatigue and fever. HENT: Negative for congestion and rhinorrhea. Eyes: Negative for pain and discharge. Respiratory: Negative for cough and shortness of breath. Cardiovascular: Negative for chest pain and palpitations. Gastrointestinal: Negative for abdominal pain, constipation, diarrhea, nausea and vomiting. Endocrine: Negative for cold intolerance and heat intolerance.    Genitourinary: Negative for dysuria and hematuria. Musculoskeletal: Positive for arthralgias and back pain. Negative for gait problem and neck pain. Skin: Negative for rash and wound. Neurological: Negative for syncope and weakness. Hematological: Negative for adenopathy. Does not bruise/bleed easily. Psychiatric/Behavioral: Negative for dysphoric mood. The patient is nervous/anxious. Past Medical History:   Diagnosis Date    Anxiety     Arthritis     Bilateral cataracts     Chronic kidney disease     [facility]     Diabetes mellitus [facility]     Diabetes mellitus ([facility]     Hyperlipidemia     Cholesterol management per pcp.  Hypertension     IBS (irritable bowel syndrome)     Neuropathy     Pericardial effusion     Peripheral vascular disease (HCC)     Smoker     Type 2 diabetes mellitus without complication (HCC)        Current Outpatient Medications   Medication Sig Dispense Refill    HYDROcodone-acetaminophen (NORCO) 7.5-325 MG per tablet Take 1 tablet by mouth every 8 hours as needed for Pain. 90 tablet 0    gabapentin (NEURONTIN) 400 MG capsule Take 2 capsules by mouth 3 times daily. 180 capsule 1    insulin lispro (HUMALOG KWIKPEN) 100 UNIT/ML pen Inject 40 Units into the skin 3 times daily (before meals) 5 pen 3    ALPRAZolam (XANAX) 1 MG tablet 1/2-1 tablet 3 times per day as need.  60 tablet 2    montelukast (SINGULAIR) 10 MG tablet Take 1 tablet by mouth nightly 30 tablet 0    promethazine (PHENERGAN) 25 MG tablet TAKE 1 TABLET BY MOUTH EVERY 6 HOURS AS NEEDED FOR NAUSEA AND VOMITING 45 tablet 1    atorvastatin (LIPITOR) 40 MG tablet TAKE 1 TABLET BY MOUTH 1 TIME DAILY 30 tablet 5    NOVOLOG FLEXPEN 100 UNIT/ML injection pen INJECT UNDER THE SKIN 40 UNITS THREE TIMES A DAY BEFORE MEALS 15 mL 3    TRESIBA FLEXTOUCH 200 UNIT/ML SOPN INJECT 100 UNITS SUBQ IN THE EVENING 18 mL 5    diltiazem (DILT-XR) 180 MG extended release capsule TAKE 1 CAPSULE BY MOUTH 1 TIME DAILY 30 capsule 5    furosemide (LASIX) 40 MG tablet TAKE 1 TABLET BY MOUTH TWO TIMES A DAY 60 tablet 5    omeprazole (PRILOSEC) 20 MG delayed release capsule Take 1 capsule by mouth daily 30 capsule 5    levothyroxine (SYNTHROID) 25 MCG tablet Take 1 tablet by mouth daily 90 tablet 1    RAYALDEE 30 MCG CPCR       esomeprazole (NEXIUM) 40 MG delayed release capsule       famotidine (PEPCID) 40 MG tablet Take 40 mg by mouth daily       aspirin (KSENIA ASPIRIN) 325 MG tablet Take 1 tablet by mouth daily 30 tablet 3    nicotine (NICODERM CQ) 14 MG/24HR Place 1 patch onto the skin every 24 hours 30 patch 3    escitalopram (LEXAPRO) 10 MG tablet TAKE 1 TABLET BY MOUTH 1 TIME DAILY 30 tablet 3    Diabetic Shoe MISC by Does not apply route I pair of shoes with 3 pair of heat molded inserts. 1 each 0    sucralfate (CARAFATE) 1 GM tablet Take 1 tablet by mouth 4 times daily 120 tablet 5    amitriptyline (ELAVIL) 10 MG tablet Take 1 tablet by mouth nightly 30 tablet 5    dicyclomine (BENTYL) 10 MG capsule Take 1 capsule by mouth 4 times daily (before meals and nightly) 120 capsule 5    Empagliflozin-Metformin HCl ER (SYNJARDY XR) 5-1000 MG TB24 Take 1 tablet by mouth daily 60 tablet 5    fluticasone-salmeterol (ADVAIR DISKUS) 250-50 MCG/DOSE AEPB Inhale 1 puff into the lungs every 12 hours 60 each 5    Insulin Degludec (TRESIBA FLEXTOUCH) 200 UNIT/ML SOPN Inject 100 Units into the skin nightly 5 pen 5    lisinopril (PRINIVIL;ZESTRIL) 10 MG tablet Take 2 tablets by mouth daily 30 tablet 5    tiotropium (SPIRIVA HANDIHALER) 18 MCG inhalation capsule Inhale 1 capsule into the lungs daily 30 capsule 5    naproxen (NAPROSYN) 500 MG tablet Take 1 tablet by mouth 2 times daily 20 tablet 0    promethazine-dextromethorphan (PROMETHAZINE-DM) 6.25-15 MG/5ML syrup Take 5 mLs by mouth 4 times daily as needed for Cough 180 mL 2    triamcinolone (KENALOG) 0.1 % cream Apply topically 2 times daily.  80 g 5    albuterol (ACCUNEB) 0.63 MG/3ML nebulizer solution Take 3 mLs by nebulization every 6 hours as needed for Wheezing 270 mL 5     No current facility-administered medications for this visit. Allergies   Allergen Reactions    Ceftin [Cefuroxime Axetil] Swelling    Codeine Nausea Only    Dye [Iodides] Swelling and Other (See Comments)     SOB    Iodinated Diagnostic Agents        Past Surgical History:   Procedure Laterality Date    APPENDECTOMY      CHOLECYSTECTOMY      COLONOSCOPY      ENDOSCOPY, COLON, DIAGNOSTIC      FINGER SURGERY      HYSTERECTOMY, TOTAL ABDOMINAL         Social History     Tobacco Use    Smoking status: Current Every Day Smoker     Packs/day: 1.00     Years: 40.00     Pack years: 40.00     Types: Cigarettes    Smokeless tobacco: Never Used    Tobacco comment: up to a pack a day   Substance Use Topics    Alcohol use: No    Drug use: No       Family History   Problem Relation Age of Onset    Cancer Mother     Cancer Father     Heart Failure Other     Heart Failure Maternal Grandfather        BP (!) 140/88 (Site: Left Upper Arm, Position: Sitting, Cuff Size: Large Adult)   Pulse 94   Temp 98.3 °F (36.8 °C) (Oral)   Resp 14   Ht 5' 3\" (1.6 m)   Wt 166 lb (75.3 kg)   SpO2 97%   BMI 29.41 kg/m²     Physical Exam   Constitutional: She is oriented to person, place, and time. She appears well-developed and well-nourished. No distress. HENT:   Head: Normocephalic and atraumatic. Right Ear: External ear normal.   Left Ear: External ear normal.   Nose: Nose normal.   Eyes: Conjunctivae and EOM are normal. Right eye exhibits no discharge. Left eye exhibits no discharge. No scleral icterus. Neck: Neck supple. No tracheal deviation present. Bandage from previous surgery in place. Surgical incision appears to be healing well with sutures in place. There is some surrounding erythema without signs of drainage and no appreciable warmth to the skin.  No signs of infection at this time.   Cardiovascular: Normal rate, regular rhythm, normal heart sounds and intact distal pulses. Exam reveals no gallop and no friction rub. Pulmonary/Chest: Effort normal and breath sounds normal. No respiratory distress. She has no wheezes. She has no rales. Abdominal: Soft. Bowel sounds are normal. She exhibits no distension. There is no tenderness. Musculoskeletal: She exhibits no edema (Bilateral lower extremities) or deformity (No gross deformities of upper or lower extremities). Neurological: She is alert and oriented to person, place, and time. No cranial nerve deficit. Skin: Skin is warm and dry. No rash noted. She is not diaphoretic. No erythema. Psychiatric: Her behavior is normal. Thought content normal.   Nursing note and vitals reviewed. Assessment:       ICD-10-CM    1. Chronic pain syndrome G89.4 External Referral To Pain Clinic     HYDROcodone-acetaminophen (NORCO) 7.5-325 MG per tablet    Discussed proper use of medication and stress importance of as needed use only. With patient doing well with her current medication if we do not run into any problems we will plan on continuing her current dose of Norco until she get established pain management which time we will defer to pain management for long-term management of her chronic pain issues. At this time the plan will be for patient to call in may for a refill if she does not have any problems and if she is not in need of a full month prescription to allow her to get the pain management she was instructed to provide that information to allow a smooth transition to pain management taking over of her chronic pain management. 2. Neuropathy G62.9 gabapentin (NEURONTIN) 400 MG capsule    Doing well with current dose of Neurontin. We'll continue this time continue to monitor and adjust as course dictates. 3. Anxiety F41.9 Doing well with current dose of Xanax.  Stress importance of working on decreasing her utilization to as needed use only. Previously discussed concerns with the combination of benzodiazepines and opioid pain medications. We'll continue to monitor and adjust as course dictates. 4. 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 Stress importance of maintaining follow-up with her surgeon for continued management. Plan:  Discussed proper use of medication. Discussed signs and symptoms requiring medical attention. All questions were answered and patient voiced understanding and agreement with plan as discussed. Orders Placed This Encounter   Procedures    External Referral To Pain Clinic     Referral Priority:   Routine     Referral Type:   Eval and Treat     Referral Reason:   Specialty Services Required     Referred to Provider:   Damian Sheldon DO     Requested Specialty:   Pain Management     Number of Visits Requested:   1     Orders Placed This Encounter   Medications    HYDROcodone-acetaminophen (NORCO) 7.5-325 MG per tablet     Sig: Take 1 tablet by mouth every 8 hours as needed for Pain. Dispense:  90 tablet     Refill:  0     Reduce doses taken as pain becomes manageable    gabapentin (NEURONTIN) 400 MG capsule     Sig: Take 2 capsules by mouth 3 times daily. Dispense:  180 capsule     Refill:  1     Medications Discontinued During This Encounter   Medication Reason    HYDROcodone-acetaminophen ([facility]) 7.5-325 MG per tablet REORDER    gabapentin (NEURONTIN) 400 MG capsule REORDER     Patient Instructions   Patient given educational handouts and has had all questions answered. Patient voices understanding and agrees to plans along with risks and benefits of plan. Patient is instructed to continue prior meds,diet, and exercise plans as instructed. Patient agrees to follow up as instructed and sooner if needed. Patient agrees to go to ER if condition becomes emergent.       Return in about 2 months (around 6/22/2019), or if symptoms worsen or

## 2019-04-22 NOTE — PATIENT INSTRUCTIONS
Patient Education        Learning About Diabetes Food Guidelines  Your Care Instructions    Meal planning is important to manage diabetes. It helps keep your blood sugar at a target level (which you set with your doctor). You don't have to eat special foods. You can eat what your family eats, including sweets once in a while. But you do have to pay attention to how often you eat and how much you eat of certain foods. You may want to work with a dietitian or a certified diabetes educator (CDE) to help you plan meals and snacks. A dietitian or CDE can also help you lose weight if that is one of your goals. What should you know about eating carbs? Managing the amount of carbohydrate (carbs) you eat is an important part of healthy meals when you have diabetes. Carbohydrate is found in many foods. · Learn which foods have carbs. And learn the amounts of carbs in different foods. ? Bread, cereal, pasta, and rice have about 15 grams of carbs in a serving. A serving is 1 slice of bread (1 ounce), ½ cup of cooked cereal, or 1/3 cup of cooked pasta or rice. ? Fruits have 15 grams of carbs in a serving. A serving is 1 small fresh fruit, such as an apple or orange; ½ of a banana; ½ cup of cooked or canned fruit; ½ cup of fruit juice; 1 cup of melon or raspberries; or 2 tablespoons of dried fruit. ? Milk and no-sugar-added yogurt have 15 grams of carbs in a serving. A serving is 1 cup of milk or 2/3 cup of no-sugar-added yogurt. ? Starchy vegetables have 15 grams of carbs in a serving. A serving is ½ cup of mashed potatoes or sweet potato; 1 cup winter squash; ½ of a small baked potato; ½ cup of cooked beans; or ½ cup cooked corn or green peas. · Learn how much carbs to eat each day and at each meal. A dietitian or CDE can teach you how to keep track of the amount of carbs you eat. This is called carbohydrate counting. · If you are not sure how to count carbohydrate grams, use the Plate Method to plan meals.  It is a good, quick way to make sure that you have a balanced meal. It also helps you spread carbs throughout the day. ? Divide your plate by types of foods. Put non-starchy vegetables on half the plate, meat or other protein food on one-quarter of the plate, and a grain or starchy vegetable in the final quarter of the plate. To this you can add a small piece of fruit and 1 cup of milk or yogurt, depending on how many carbs you are supposed to eat at a meal.  · Try to eat about the same amount of carbs at each meal. Do not \"save up\" your daily allowance of carbs to eat at one meal.  · Proteins have very little or no carbs per serving. Examples of proteins are beef, chicken, turkey, fish, eggs, tofu, cheese, cottage cheese, and peanut butter. A serving size of meat is 3 ounces, which is about the size of a deck of cards. Examples of meat substitute serving sizes (equal to 1 ounce of meat) are 1/4 cup of cottage cheese, 1 egg, 1 tablespoon of peanut butter, and ½ cup of tofu. How can you eat out and still eat healthy? · Learn to estimate the serving sizes of foods that have carbohydrate. If you measure food at home, it will be easier to estimate the amount in a serving of restaurant food. · If the meal you order has too much carbohydrate (such as potatoes, corn, or baked beans), ask to have a low-carbohydrate food instead. Ask for a salad or green vegetables. · If you use insulin, check your blood sugar before and after eating out to help you plan how much to eat in the future. · If you eat more carbohydrate at a meal than you had planned, take a walk or do other exercise. This will help lower your blood sugar. What else should you know? · Limit saturated fat, such as the fat from meat and dairy products. This is a healthy choice because people who have diabetes are at higher risk of heart disease. So choose lean cuts of meat and nonfat or low-fat dairy products.  Use olive or canola oil instead of butter or shortening when cooking. · Don't skip meals. Your blood sugar may drop too low if you skip meals and take insulin or certain medicines for diabetes. · Check with your doctor before you drink alcohol. Alcohol can cause your blood sugar to drop too low. Alcohol can also cause a bad reaction if you take certain diabetes medicines. Follow-up care is a key part of your treatment and safety. Be sure to make and go to all appointments, and call your doctor if you are having problems. It's also a good idea to know your test results and keep a list of the medicines you take. Where can you learn more? Go to https://Tweetflowpepiceweb.[employer]. org and sign in to your [facility] account. Enter K754 in the tribr box to learn more about \"Learning About Diabetes Food Guidelines. \"     If you do not have an account, please click on the \"Sign Up Now\" link. Current as of: July 25, 2018  Content Version: 11.9  © 4426-5236 [employer]. Care instructions adapted under license by [facility] ([facility]). If you have questions about a medical condition or this instruction, always ask your healthcare professional. [place] [address] any warranty or liability for your use of this information.

## 2019-05-28 ENCOUNTER — TRANSCRIBE ORDERS (OUTPATIENT)
Dept: ADMINISTRATIVE | Facility: HOSPITAL | Age: 64
End: 2019-05-28

## 2019-05-28 DIAGNOSIS — M54.16 LUMBAR RADICULOPATHY: Primary | ICD-10-CM

## 2019-05-30 ENCOUNTER — TELEPHONE (OUTPATIENT)
Dept: FAMILY MEDICINE CLINIC | Age: 64
End: 2019-05-30

## 2019-06-05 ENCOUNTER — APPOINTMENT (OUTPATIENT)
Dept: MRI IMAGING | Facility: HOSPITAL | Age: 64
End: 2019-06-05

## 2019-06-06 ENCOUNTER — TELEPHONE (OUTPATIENT)
Dept: FAMILY MEDICINE CLINIC | Age: 64
End: 2019-06-06

## 2019-06-06 NOTE — TELEPHONE ENCOUNTER
Patient had called requesting refills on xanax 5/29/19. Patient is completely out and pharmacy is telling her she is out of refills. I explained to patient that she was given refills on her prescription 3/25/19 and should have enough to last until her follow up. I told patient that I would call the pharmacy and see what I could find out. Pharmacist says that rx was filled 3/25/19, 4/12/19 and 4/30/19, every 18 days. I explained that the rx was for a 30 day supply but pharmacist said because it was not noted on the rx (30 day supply) they were letting her have it every 18 days. I have called patient and explained this to her and patient says that there's no way she would fill them like that. However, she just calls the pharmacy and tells them to send what is due (not just xanax) and they deliver to her. Also explained to patient that it was discussed at last office visit that she was to only use xanax on an as needed basis and she was to be working on decreasing her utilization of the medication. Patient does not follow up until 6/24/19.

## 2019-06-06 NOTE — TELEPHONE ENCOUNTER
If the prescriptions were filled then she should have the medicine at home unless she is taking it in a fashion that is other than as we discussed in office. If she does not have any remaining at home then I can send in medicine to allow her to wean off the medicine but it would be for that purpose to wean off the medicine with intentions of not prescribing it in the future. She has to be responsible for her medicines and if she can not then I can not confidently prescribe it and be comfortable with that.

## 2019-06-07 NOTE — TELEPHONE ENCOUNTER
Patient made aware. Patient now says that she has a few of her xanax left and will use them sparingly until her follow up appointment. Dr Baldo Jauregui made aware.

## 2019-06-11 ENCOUNTER — OFFICE VISIT (OUTPATIENT)
Dept: ENDOCRINOLOGY | Facility: CLINIC | Age: 64
End: 2019-06-11

## 2019-06-11 VITALS
SYSTOLIC BLOOD PRESSURE: 174 MMHG | WEIGHT: 168.2 LBS | BODY MASS INDEX: 29.8 KG/M2 | OXYGEN SATURATION: 98 % | HEART RATE: 107 BPM | DIASTOLIC BLOOD PRESSURE: 84 MMHG

## 2019-06-11 DIAGNOSIS — E11.42 DIABETIC POLYNEUROPATHY ASSOCIATED WITH TYPE 2 DIABETES MELLITUS (HCC): ICD-10-CM

## 2019-06-11 DIAGNOSIS — E78.2 MIXED DIABETIC HYPERLIPIDEMIA ASSOCIATED WITH TYPE 2 DIABETES MELLITUS (HCC): ICD-10-CM

## 2019-06-11 DIAGNOSIS — E11.59 HYPERTENSION ASSOCIATED WITH DIABETES (HCC): ICD-10-CM

## 2019-06-11 DIAGNOSIS — IMO0002 UNCONTROLLED TYPE 2 DIABETES MELLITUS, WITHOUT LONG-TERM CURRENT USE OF INSULIN: Primary | ICD-10-CM

## 2019-06-11 DIAGNOSIS — E11.65 TYPE 2 DIABETES MELLITUS WITH HYPERGLYCEMIA, WITH LONG-TERM CURRENT USE OF INSULIN (HCC): ICD-10-CM

## 2019-06-11 DIAGNOSIS — Z79.4 TYPE 2 DIABETES MELLITUS WITH HYPERGLYCEMIA, WITH LONG-TERM CURRENT USE OF INSULIN (HCC): ICD-10-CM

## 2019-06-11 DIAGNOSIS — I15.2 HYPERTENSION ASSOCIATED WITH DIABETES (HCC): ICD-10-CM

## 2019-06-11 DIAGNOSIS — E11.69 MIXED DIABETIC HYPERLIPIDEMIA ASSOCIATED WITH TYPE 2 DIABETES MELLITUS (HCC): ICD-10-CM

## 2019-06-11 PROCEDURE — 99214 OFFICE O/P EST MOD 30 MIN: CPT | Performed by: NURSE PRACTITIONER

## 2019-06-11 PROCEDURE — 95250 CONT GLUC MNTR PHYS/QHP EQP: CPT | Performed by: NURSE PRACTITIONER

## 2019-06-11 RX ORDER — FLUCONAZOLE 150 MG/1
150 TABLET ORAL ONCE
Qty: 1 TABLET | Refills: 5 | Status: SHIPPED | OUTPATIENT
Start: 2019-06-11 | End: 2019-06-11

## 2019-06-11 NOTE — PROGRESS NOTES
Subjective    Yolanda Jacobs is a 63 y.o. female. she is here today for follow-up.    History of Present Illness       Primary Care Provider     Job Palomo MD     Duration 10 years     Timing - Diabetes is Constant     Quality -  poorly controlled     Severity -  severe     Complications - peripheral neuropathy, CVA      Current symptoms/problems  paresthesia of the feet      Alleviating Factors: Compliance       Side Effects  none     Current diet  lower carb, stopped regular drinks        Current exercise none     Current monitoring regimen: home blood tests - 3 times daily    Component      Latest Ref Rng & Units 1/11/2019   Hemoglobin A1C      % 10.4           Home blood sugar records:     She was running 500 and higher     Has changed diet     States the highest has been 180   Has had some low sugars       This am was 160             Hypoglycemia -- nocturnal hypoglycemia       She has had two surgeries for cyst removal     During surgery she had a CVA         The following portions of the patient's history were reviewed and updated as appropriate:   Past Medical History:   Diagnosis Date   • COPD (chronic obstructive pulmonary disease) (CMS/HCC)    • Diabetes mellitus (CMS/[facility])    • HTN (hypertension)    • Hyperlipidemia      Past Surgical History:   Procedure Laterality Date   • APPENDECTOMY     • CHOLECYSTECTOMY     • HYSTERECTOMY     • KNEE SURGERY       Family History   Problem Relation Age of Onset   • Diabetes Mother    • Cancer Mother    • Lung cancer Father    • No Known Problems Sister    • Cancer Brother      OB History     No data available        Current Outpatient Medications   Medication Sig Dispense Refill   • albuterol (ACCUNEB) 0.63 MG/3ML nebulizer solution Take 1 ampule by nebulization every 6 hours as needed for Wheezing     • Alcohol Swabs (ALCOHOL WIPES) pads Use 4 x daily 120 each 11   • ALPRAZolam (XANAX) 1 MG tablet Take 1 mg by mouth 3 (Three) Times a Day As Needed for  Anxiety.     • amitriptyline (ELAVIL) 10 MG tablet Take 10 mg by mouth Every Night.     • aspirin 81 MG tablet Take 1 tablet by mouth Daily. 30 tablet 11   • atorvastatin (LIPITOR) 40 MG tablet Take 40 mg by mouth Daily.     • Blood Glucose Monitoring Suppl w/Device kit USE AS INDICATED, ANY MONITOR 1 each 1   • cyclobenzaprine (FLEXERIL) 10 MG tablet Take 10 mg by mouth 3 (Three) Times a Day As Needed for Muscle Spasms.     • diltiaZEM CD (CARDIZEM CD) 180 MG 24 hr capsule Take 180 mg by mouth Daily.     • Empagliflozin-Metformin HCl ER (SYNJARDY XR) 5-1000 MG tablet sustained-release 24 hour Take 2 tablet/day by mouth Daily With Breakfast. 60 tablet 11   • esomeprazole (nexIUM) 40 MG capsule Take 40 mg by mouth Every Morning Before Breakfast.     • fluconazole (DIFLUCAN) 150 MG tablet Take 1 tablet by mouth 1 (One) Time Per Week. 4 tablet 6   • fluticasone-salmeterol (ADVAIR) 250-50 MCG/DOSE DISKUS Inhale 1 puff 2 (Two) Times a Day.     • furosemide (LASIX) 40 MG tablet Take 40 mg by mouth Daily.     • gabapentin (NEURONTIN) 400 MG capsule 2 capsules  capsule 5   • Glucose Blood (BLOOD GLUCOSE TEST) strip Use 4 x daily, use any brand covered by insurance or same brand as before 120 each 11   • HYDROcodone-acetaminophen (NORCO) 7.5-325 MG per tablet Take 1 tablet by mouth Every 4 (Four) Hours As Needed for Moderate Pain (4-6).     • Insulin Degludec (TRESIBA FLEXTOUCH) 200 UNIT/ML solution pen-injector Inject 100 Units under the skin Every Night. Up to 150 units daily 8 pen 11   • Insulin Lispro (HUMALOG KWIKPEN) 200 UNIT/ML solution pen-injector Inject 40 Units under the skin 3 (Three) Times a Day With Meals. 6 pen 11   • Insulin Pen Needle (B-D UF III MINI PEN NEEDLES) 31G X 5 MM misc Use 4 times daily 120 each 11   • Lancet Devices (LANCING DEVICE) misc USE AS INDICATED TO CORRELATE WITH STRIPS AND METER 1 each 1   • Lancets 30G misc USE 4 X DAILY 120 each 11   • levothyroxine (SYNTHROID, LEVOTHROID) 25  MCG tablet Take 25 mcg by mouth Daily.     • lisinopril (PRINIVIL,ZESTRIL) 20 MG tablet Take 20 mg by mouth Daily.     • loratadine (CLARITIN) 10 MG tablet Take 10 mg by mouth Daily.     • sulfamethoxazole-trimethoprim (BACTRIM,SEPTRA) 400-80 MG tablet Take 1 tablet by mouth 2 (Two) Times a Day. 20 tablet 0   • TRULICITY 0.75 MG/0.5ML solution pen-injector INJECT 0.75 MG UNDER THE SKIN ON THE SAME DAY EACH WEEK. 2 mL 4   • fluconazole (DIFLUCAN) 150 MG tablet Take 1 tablet by mouth 1 (One) Time for 1 dose. 1 tablet 5   • insulin detemir (LEVEMIR) 100 UNIT/ML injection Inject 70 Units under the skin Every Morning.     • insulin detemir (LEVEMIR) 100 UNIT/ML injection Inject 86 Units under the skin Every Evening.     • varenicline (CHANTIX) 0.5 MG tablet Take 0.5 mg by mouth 2 (Two) Times a Day.       No current facility-administered medications for this visit.      Allergies   Allergen Reactions   • Ceftin [Cefuroxime Axetil]    • Iodinated Diagnostic Agents      Social History     Socioeconomic History   • Marital status: Legally      Spouse name: Not on file   • Number of children: Not on file   • Years of education: Not on file   • Highest education level: Not on file   Tobacco Use   • Smoking status: Current Every Day Smoker     Packs/day: 0.50   • Smokeless tobacco: Never Used   Substance and Sexual Activity   • Alcohol use: No   • Drug use: No   • Sexual activity: Defer       Review of Systems  Review of Systems   Constitutional: Negative for activity change, appetite change, diaphoresis and fatigue.   HENT: Negative for facial swelling, sneezing, sore throat, tinnitus, trouble swallowing and voice change.    Eyes: Negative for photophobia, pain, discharge, redness, itching and visual disturbance.   Respiratory: Negative for apnea, cough, choking, chest tightness and shortness of breath.    Cardiovascular: Negative for chest pain, palpitations and leg swelling.   Gastrointestinal: Negative for  abdominal distention, abdominal pain, constipation, diarrhea, nausea and vomiting.   Endocrine: Negative for cold intolerance, heat intolerance, polydipsia, polyphagia and polyuria.   Genitourinary: Negative for difficulty urinating, dysuria, frequency, hematuria and urgency.   Musculoskeletal: Negative for arthralgias, back pain, gait problem, joint swelling, myalgias, neck pain and neck stiffness.   Skin: Negative for color change, pallor, rash and wound.   Neurological: Negative for dizziness, tremors, weakness, light-headedness, numbness and headaches.   Hematological: Negative for adenopathy. Does not bruise/bleed easily.   Psychiatric/Behavioral: Negative for behavioral problems, confusion and sleep disturbance.        Objective    /84 (BP Location: Right arm, Patient Position: Sitting)   Pulse 107   Wt 76.3 kg (168 lb 3.2 oz)   SpO2 98%   BMI 29.80 kg/m²   Physical Exam   Constitutional: She is oriented to person, place, and time. She appears well-developed and well-nourished. No distress.   HENT:   Head: Normocephalic and atraumatic.   Right Ear: External ear normal.   Left Ear: External ear normal.   Nose: Nose normal.   Eyes: Conjunctivae and EOM are normal. Pupils are equal, round, and reactive to light.   Neck: Normal range of motion. Neck supple. No tracheal deviation present. No thyromegaly present.   Cardiovascular: Normal rate, regular rhythm and normal heart sounds.   No murmur heard.  Pulmonary/Chest: Effort normal and breath sounds normal. No respiratory distress. She has no wheezes.   Abdominal: Soft. Bowel sounds are normal. There is no tenderness. There is no rebound and no guarding.   Musculoskeletal: Normal range of motion. She exhibits no edema, tenderness or deformity.   Neurological: She is alert and oriented to person, place, and time. No cranial nerve deficit.   Skin: Skin is warm and dry. No rash noted.   Psychiatric: She has a normal mood and affect. Her behavior is normal.  Judgment and thought content normal.       Lab Review  Glucose (mg/dL)   Date Value   07/27/2017 360 (H)     Sodium (mmol/L)   Date Value   07/27/2017 134 (L)     Potassium (mmol/L)   Date Value   07/27/2017 4.1     Chloride (mmol/L)   Date Value   07/27/2017 101     CO2 (mmol/L)   Date Value   07/27/2017 23.0 (L)     BUN (mg/dL)   Date Value   07/27/2017 15     Creatinine (mg/dL)   Date Value   07/27/2017 0.88     Hemoglobin A1C (%)   Date Value   01/11/2019 10.4   10/12/2018 11.9 (H)     Triglycerides (mg/dL)   Date Value   10/12/2018 152 (H)     LDL Cholesterol  (mg/dL)   Date Value   10/12/2018 109       Assessment/Plan      1. Uncontrolled type 2 diabetes mellitus, without long-term current use of insulin (CMS/[facility])    2. Type 2 diabetes mellitus with hyperglycemia, with long-term current use of insulin (CMS/[facility])    3. Diabetic polyneuropathy associated with type 2 diabetes mellitus (CMS/[facility])    4. Hypertension associated with diabetes (CMS/[facility])    5. Mixed diabetic hyperlipidemia associated with type 2 diabetes mellitus (CMS/[facility])    .    Medications prescribed:  Outpatient Encounter Medications as of 6/11/2019   Medication Sig Dispense Refill   • albuterol (ACCUNEB) 0.63 MG/3ML nebulizer solution Take 1 ampule by nebulization every 6 hours as needed for Wheezing     • Alcohol Swabs (ALCOHOL WIPES) pads Use 4 x daily 120 each 11   • ALPRAZolam (XANAX) 1 MG tablet Take 1 mg by mouth 3 (Three) Times a Day As Needed for Anxiety.     • amitriptyline (ELAVIL) 10 MG tablet Take 10 mg by mouth Every Night.     • aspirin 81 MG tablet Take 1 tablet by mouth Daily. 30 tablet 11   • atorvastatin (LIPITOR) 40 MG tablet Take 40 mg by mouth Daily.     • Blood Glucose Monitoring Suppl w/Device kit USE AS INDICATED, ANY MONITOR 1 each 1   • cyclobenzaprine (FLEXERIL) 10 MG tablet Take 10 mg by mouth 3 (Three) Times a Day As Needed for Muscle Spasms.     • diltiaZEM CD (CARDIZEM CD) 180 MG 24 hr capsule Take 180 mg by mouth  Daily.     • Empagliflozin-Metformin HCl ER (SYNJARDY XR) 5-1000 MG tablet sustained-release 24 hour Take 2 tablet/day by mouth Daily With Breakfast. 60 tablet 11   • esomeprazole (nexIUM) 40 MG capsule Take 40 mg by mouth Every Morning Before Breakfast.     • fluconazole (DIFLUCAN) 150 MG tablet Take 1 tablet by mouth 1 (One) Time Per Week. 4 tablet 6   • fluticasone-salmeterol (ADVAIR) 250-50 MCG/DOSE DISKUS Inhale 1 puff 2 (Two) Times a Day.     • furosemide (LASIX) 40 MG tablet Take 40 mg by mouth Daily.     • gabapentin (NEURONTIN) 400 MG capsule 2 capsules  capsule 5   • Glucose Blood (BLOOD GLUCOSE TEST) strip Use 4 x daily, use any brand covered by insurance or same brand as before 120 each 11   • HYDROcodone-acetaminophen (NORCO) 7.5-325 MG per tablet Take 1 tablet by mouth Every 4 (Four) Hours As Needed for Moderate Pain (4-6).     • Insulin Degludec (TRESIBA FLEXTOUCH) 200 UNIT/ML solution pen-injector Inject 100 Units under the skin Every Night. Up to 150 units daily 8 pen 11   • Insulin Lispro (HUMALOG KWIKPEN) 200 UNIT/ML solution pen-injector Inject 40 Units under the skin 3 (Three) Times a Day With Meals. 6 pen 11   • Insulin Pen Needle (B-D UF III MINI PEN NEEDLES) 31G X 5 MM misc Use 4 times daily 120 each 11   • Lancet Devices (LANCING DEVICE) misc USE AS INDICATED TO CORRELATE WITH STRIPS AND METER 1 each 1   • Lancets 30G misc USE 4 X DAILY 120 each 11   • levothyroxine (SYNTHROID, LEVOTHROID) 25 MCG tablet Take 25 mcg by mouth Daily.     • lisinopril (PRINIVIL,ZESTRIL) 20 MG tablet Take 20 mg by mouth Daily.     • loratadine (CLARITIN) 10 MG tablet Take 10 mg by mouth Daily.     • sulfamethoxazole-trimethoprim (BACTRIM,SEPTRA) 400-80 MG tablet Take 1 tablet by mouth 2 (Two) Times a Day. 20 tablet 0   • TRULICITY 0.75 MG/0.5ML solution pen-injector INJECT 0.75 MG UNDER THE SKIN ON THE SAME DAY EACH WEEK. 2 mL 4   • fluconazole (DIFLUCAN) 150 MG tablet Take 1 tablet by mouth 1 (One) Time  for 1 dose. 1 tablet 5   • insulin detemir (LEVEMIR) 100 UNIT/ML injection Inject 70 Units under the skin Every Morning.     • insulin detemir (LEVEMIR) 100 UNIT/ML injection Inject 86 Units under the skin Every Evening.     • varenicline (CHANTIX) 0.5 MG tablet Take 0.5 mg by mouth 2 (Two) Times a Day.       No facility-administered encounter medications on file as of 6/11/2019.        Orders placed during this encounter include:  No orders of the defined types were placed in this encounter.    Glycemic Management      Component      Latest Ref Rng & Units 1/11/2019   Hemoglobin A1C      % 10.4             Metformin 1 gram once daily with lunch --stopped   Having frequent GMI - I will still add jardiance and combine with metformin       synjardy xr5/1000, 2 tabs w bkfast and add diflucan 150 mg weekly          trulicity 0.75 mg weekly - taking             Tresiba -- taking 80 units       apply 50 point rule           Humalog doing 4 units per 15 grams of CHO      + sliding scale      3 per 50          Uncontrolled diabetes  Hyperglycemia    Insertion of continuous glucose monitor to define patter    The continuous glucose monitor that was inserted is a brad glucose monitor    Return in 2 weeks            Lipid Management        On lipitor 40 mg qhs      Blood Pressure Management:             bp controlled on ace I regimen            Microvascular Complication Monitoring:       Eye Exam Evaluation, within 1 year      -----------     Last Microalbumin-Proteinuria Assessment     No results found for: MALBCRERALEONILAO     No results found for: UTPCR     -----------        Neuropathy, yes . On neurontin 300 mg tid, may increase up to 900 mg tid --change to 400 mg capsule two TID         Immunizations:             Preventive Care:       I advised Yolanda of the risks of continuing to use tobacco, and I provided her with tobacco cessation educational materials in the After Visit Summary.     During this visit, I spent less  than 3  minutes counseling the patient regarding tobacco cessation.             Weight Related:       Patient's Body mass index is 29.8 kg/m². BMI is above normal parameters. Recommendations include: educational material.             Diet interventions: moderate (500 kCal/d) deficit diet.        Bone Health               Lab Results   Component Value Date     CALCIUM 9.6 07/27/2017         Thyroid Health     No results found for: TSH                 Other Diabetes Related Aspects         No results found for: [id number]         4. Follow-up: Return in about 3 months (around 9/11/2019) for Recheck.

## 2019-06-12 ENCOUNTER — HOSPITAL ENCOUNTER (OUTPATIENT)
Dept: MRI IMAGING | Facility: HOSPITAL | Age: 64
Discharge: HOME | End: 2019-06-12
Admitting: PHYSICAL MEDICINE & REHABILITATION

## 2019-06-12 PROCEDURE — 72148 MRI LUMBAR SPINE W/O DYE: CPT

## 2019-06-24 ENCOUNTER — OFFICE VISIT (OUTPATIENT)
Dept: FAMILY MEDICINE CLINIC | Age: 64
End: 2019-06-24
Payer: MEDICARE

## 2019-06-24 VITALS
RESPIRATION RATE: 20 BRPM | BODY MASS INDEX: 29.41 KG/M2 | DIASTOLIC BLOOD PRESSURE: 88 MMHG | WEIGHT: 166 LBS | TEMPERATURE: 97.4 F | OXYGEN SATURATION: 98 % | HEART RATE: 122 BPM | SYSTOLIC BLOOD PRESSURE: 134 MMHG | HEIGHT: 63 IN

## 2019-06-24 DIAGNOSIS — E78.2 MIXED HYPERLIPIDEMIA: ICD-10-CM

## 2019-06-24 DIAGNOSIS — E11.65 TYPE 2 DIABETES MELLITUS WITH HYPERGLYCEMIA, WITH LONG-TERM CURRENT USE OF INSULIN (HCC): ICD-10-CM

## 2019-06-24 DIAGNOSIS — F41.9 ANXIETY: ICD-10-CM

## 2019-06-24 DIAGNOSIS — E03.9 ACQUIRED HYPOTHYROIDISM: ICD-10-CM

## 2019-06-24 DIAGNOSIS — I10 ESSENTIAL HYPERTENSION: ICD-10-CM

## 2019-06-24 DIAGNOSIS — L98.9 SKIN SORE: Primary | ICD-10-CM

## 2019-06-24 DIAGNOSIS — Z79.4 TYPE 2 DIABETES MELLITUS WITH HYPERGLYCEMIA, WITH LONG-TERM CURRENT USE OF INSULIN (HCC): ICD-10-CM

## 2019-06-24 PROCEDURE — 99214 OFFICE O/P EST MOD 30 MIN: CPT | Performed by: FAMILY MEDICINE

## 2019-06-24 RX ORDER — ALPRAZOLAM 1 MG/1
TABLET ORAL
Qty: 60 TABLET | Refills: 2 | Status: SHIPPED | OUTPATIENT
Start: 2019-06-24 | End: 2019-09-18 | Stop reason: SDUPTHER

## 2019-06-24 RX ORDER — MUPIROCIN CALCIUM 20 MG/G
CREAM TOPICAL
Qty: 1 TUBE | Refills: 1 | Status: SHIPPED | OUTPATIENT
Start: 2019-06-24 | End: 2019-07-24

## 2019-06-24 NOTE — PROGRESS NOTES
putting honey on the area which she feels like has been helpful. Review of Systems   Constitutional: Negative for activity change, appetite change, fatigue and fever. HENT: Negative for congestion and rhinorrhea. Eyes: Negative for pain and discharge. Respiratory: Negative for cough and shortness of breath. Cardiovascular: Negative for chest pain and palpitations. Gastrointestinal: Negative for abdominal pain, constipation, diarrhea, nausea and vomiting. Endocrine: Negative for cold intolerance and heat intolerance. Genitourinary: Negative for dysuria and hematuria. Musculoskeletal: Positive for arthralgias and back pain. Negative for gait problem and neck pain. Skin: Negative for rash and wound. Neurological: Negative for syncope and weakness. Hematological: Negative for adenopathy. Does not bruise/bleed easily. Psychiatric/Behavioral: Negative for dysphoric mood. The patient is nervous/anxious. Past Medical History:   Diagnosis Date    Anxiety     Arthritis     Bilateral cataracts     Chronic kidney disease     [facility]     Diabetes mellitus [facility]     Diabetes mellitus ([facility]     Hyperlipidemia     Cholesterol management per pcp.  Hypertension     IBS (irritable bowel syndrome)     Neuropathy     Pericardial effusion     Peripheral vascular disease (HCC)     Smoker     Type 2 diabetes mellitus without complication (HCC)        Current Outpatient Medications   Medication Sig Dispense Refill    ALPRAZolam (XANAX) 1 MG tablet 1/2-1 tablet 2 times per day as need. 60 tablet 2    mupirocin (BACTROBAN) 2 % cream Apply 3 times daily. 1 Tube 1    HYDROcodone-acetaminophen (NORCO) 7.5-325 MG per tablet Take 1 tablet by mouth every 8 hours as needed for Pain. 90 tablet 0    gabapentin (NEURONTIN) 400 MG capsule Take 2 capsules by mouth 3 times daily.  180 capsule 1    insulin lispro (HUMALOG KWIKPEN) 100 UNIT/ML pen Inject 40 Units into the skin 3 times daily (before meals) 5 pen 3    montelukast (SINGULAIR) 10 MG tablet Take 1 tablet by mouth nightly 30 tablet 0    promethazine (PHENERGAN) 25 MG tablet TAKE 1 TABLET BY MOUTH EVERY 6 HOURS AS NEEDED FOR NAUSEA AND VOMITING 45 tablet 1    atorvastatin (LIPITOR) 40 MG tablet TAKE 1 TABLET BY MOUTH 1 TIME DAILY 30 tablet 5    NOVOLOG FLEXPEN 100 UNIT/ML injection pen INJECT UNDER THE SKIN 40 UNITS THREE TIMES A DAY BEFORE MEALS 15 mL 3    TRESIBA FLEXTOUCH 200 UNIT/ML SOPN INJECT 100 UNITS SUBQ IN THE EVENING 18 mL 5    diltiazem (DILT-XR) 180 MG extended release capsule TAKE 1 CAPSULE BY MOUTH 1 TIME DAILY 30 capsule 5    furosemide (LASIX) 40 MG tablet TAKE 1 TABLET BY MOUTH TWO TIMES A DAY 60 tablet 5    omeprazole (PRILOSEC) 20 MG delayed release capsule Take 1 capsule by mouth daily 30 capsule 5    levothyroxine (SYNTHROID) 25 MCG tablet Take 1 tablet by mouth daily 90 tablet 1    RAYALDEE 30 MCG CPCR       esomeprazole (NEXIUM) 40 MG delayed release capsule       famotidine (PEPCID) 40 MG tablet Take 40 mg by mouth daily       aspirin (KSENIA ASPIRIN) 325 MG tablet Take 1 tablet by mouth daily 30 tablet 3    nicotine (NICODERM CQ) 14 MG/24HR Place 1 patch onto the skin every 24 hours 30 patch 3    escitalopram (LEXAPRO) 10 MG tablet TAKE 1 TABLET BY MOUTH 1 TIME DAILY 30 tablet 3    Diabetic Shoe MISC by Does not apply route I pair of shoes with 3 pair of heat molded inserts.  1 each 0    sucralfate (CARAFATE) 1 GM tablet Take 1 tablet by mouth 4 times daily 120 tablet 5    amitriptyline (ELAVIL) 10 MG tablet Take 1 tablet by mouth nightly 30 tablet 5    dicyclomine (BENTYL) 10 MG capsule Take 1 capsule by mouth 4 times daily (before meals and nightly) 120 capsule 5    Empagliflozin-Metformin HCl ER (SYNJARDY XR) 5-1000 MG TB24 Take 1 tablet by mouth daily 60 tablet 5    fluticasone-salmeterol (ADVAIR DISKUS) 250-50 MCG/DOSE AEPB Inhale 1 puff into the lungs every current dose of Xanax. Stressed importance of as needed use only. We will continue to monitor and adjust course dictates. 3. Type 2 diabetes mellitus with hyperglycemia, with long-term current use of insulin (HCC) E11.65 Hemoglobin A1C    Discussed importance of DM diet and benefits of exercise. Discussed proper use of medication. Stressed proper foot care and monitoring. Discussed the importance of yearly eye exams. Z79.4    4. Essential hypertension I10 Will continue current medications and continue to monitor and adjust as indicated. 5. Mixed hyperlipidemia E78.2  tolerating Lipitor. Will continue at this time continue monitor. 6. Acquired hypothyroidism E03.9 TSH without Reflex     T4, Free    Doing well with current dose of Synthroid. Will continue at this time continue to monitor and adjust course dictates. Plan:  Discussed diagnosis, expected course, and proper use of medication, including OTC medications if prescription is too expensive or insurance does not cover. Discussed signs and symptoms requiring medical attention. All questions were answered and patient voiced understanding and agreement with plan as discussed. Orders Placed This Encounter   Procedures    Hemoglobin A1C     Standing Status:   Future     Standing Expiration Date:   2020    TSH without Reflex     Standing Status:   Future     Standing Expiration Date:   2020    T4, Free     Standing Status:   Future     Standing Expiration Date:   2020     Orders Placed This Encounter   Medications    ALPRAZolam (XANAX) 1 MG tablet     Si/2-1 tablet 2 times per day as need. Dispense:  60 tablet     Refill:  2    mupirocin (BACTROBAN) 2 % cream     Sig: Apply 3 times daily.      Dispense:  1 Tube     Refill:  1     Medications Discontinued During This Encounter   Medication Reason    ALPRAZolam Alfmarie Solomon) 1 MG tablet REORDER     Patient Instructions   Patient given educational handouts and has had all questions answered. Patient voices understanding and agrees to plans along with risks and benefits of plan. Patient is instructed to continue prior meds,diet, and exercise plans as instructed. Patient agrees to follow up as instructed and sooner if needed. Patient agrees to go to ER if condition becomes emergent. Return in about 3 months (around 9/24/2019), or if symptoms worsen or fail to improve.

## 2019-07-12 RX ORDER — DULAGLUTIDE 0.75 MG/.5ML
INJECTION, SOLUTION SUBCUTANEOUS
Qty: 2 ML | Refills: 4 | Status: SHIPPED | OUTPATIENT
Start: 2019-07-12 | End: 2019-12-20

## 2019-07-12 ASSESSMENT — ENCOUNTER SYMPTOMS
NAUSEA: 0
SHORTNESS OF BREATH: 0
EYE DISCHARGE: 0
EYE PAIN: 0
RHINORRHEA: 0
CONSTIPATION: 0
BACK PAIN: 1
VOMITING: 0
ABDOMINAL PAIN: 0
DIARRHEA: 0
COUGH: 0

## 2019-07-15 DIAGNOSIS — Z79.4 TYPE 2 DIABETES MELLITUS WITH HYPERGLYCEMIA, WITH LONG-TERM CURRENT USE OF INSULIN (HCC): Primary | ICD-10-CM

## 2019-07-15 DIAGNOSIS — E11.65 TYPE 2 DIABETES MELLITUS WITH HYPERGLYCEMIA, WITH LONG-TERM CURRENT USE OF INSULIN (HCC): Primary | ICD-10-CM

## 2019-07-15 DIAGNOSIS — K21.9 GASTROESOPHAGEAL REFLUX DISEASE WITHOUT ESOPHAGITIS: ICD-10-CM

## 2019-07-15 RX ORDER — ESOMEPRAZOLE MAGNESIUM 40 MG/1
40 CAPSULE, DELAYED RELEASE ORAL
Qty: 30 CAPSULE | Refills: 5 | Status: SHIPPED | OUTPATIENT
Start: 2019-07-15 | End: 2019-12-23

## 2019-07-15 RX ORDER — GLUCOSAMINE HCL/CHONDROITIN SU 500-400 MG
CAPSULE ORAL
Qty: 100 STRIP | Refills: 5 | Status: SHIPPED | OUTPATIENT
Start: 2019-07-15 | End: 2022-01-18 | Stop reason: SDUPTHER

## 2019-07-15 RX ORDER — LANCETS 30 GAUGE
EACH MISCELLANEOUS
Qty: 100 EACH | Refills: 3 | Status: SHIPPED | OUTPATIENT
Start: 2019-07-15 | End: 2022-01-18 | Stop reason: SDUPTHER

## 2019-07-15 RX ORDER — PEN NEEDLE, DIABETIC 31 G X1/4"
1 NEEDLE, DISPOSABLE MISCELLANEOUS DAILY
Qty: 100 EACH | Refills: 3 | Status: SHIPPED | OUTPATIENT
Start: 2019-07-15 | End: 2022-01-18 | Stop reason: SDUPTHER

## 2019-07-22 DIAGNOSIS — E11.42 TYPE 2 DIABETES MELLITUS WITH DIABETIC POLYNEUROPATHY, WITH LONG-TERM CURRENT USE OF INSULIN (HCC): ICD-10-CM

## 2019-07-22 DIAGNOSIS — Z79.4 TYPE 2 DIABETES MELLITUS WITH DIABETIC POLYNEUROPATHY, WITH LONG-TERM CURRENT USE OF INSULIN (HCC): ICD-10-CM

## 2019-07-22 RX ORDER — INSULIN LISPRO 100 [IU]/ML
INJECTION, SOLUTION INTRAVENOUS; SUBCUTANEOUS
Qty: 15 ML | Refills: 3 | Status: SHIPPED | OUTPATIENT
Start: 2019-07-22 | End: 2019-11-20 | Stop reason: SDUPTHER

## 2019-08-19 ENCOUNTER — TELEPHONE (OUTPATIENT)
Dept: FAMILY MEDICINE CLINIC | Age: 64
End: 2019-08-19

## 2019-08-19 DIAGNOSIS — Z01.00 DIABETIC EYE EXAM (HCC): Primary | ICD-10-CM

## 2019-08-19 DIAGNOSIS — E11.9 DIABETIC EYE EXAM (HCC): Primary | ICD-10-CM

## 2019-09-18 ENCOUNTER — OFFICE VISIT (OUTPATIENT)
Dept: FAMILY MEDICINE CLINIC | Age: 64
End: 2019-09-18
Payer: MEDICARE

## 2019-09-18 VITALS
OXYGEN SATURATION: 98 % | RESPIRATION RATE: 20 BRPM | WEIGHT: 163 LBS | BODY MASS INDEX: 28.88 KG/M2 | TEMPERATURE: 97.8 F | DIASTOLIC BLOOD PRESSURE: 90 MMHG | SYSTOLIC BLOOD PRESSURE: 148 MMHG | HEIGHT: 63 IN | HEART RATE: 111 BPM

## 2019-09-18 DIAGNOSIS — Z51.81 ENCOUNTER FOR THERAPEUTIC DRUG LEVEL MONITORING: ICD-10-CM

## 2019-09-18 DIAGNOSIS — E03.9 ACQUIRED HYPOTHYROIDISM: ICD-10-CM

## 2019-09-18 DIAGNOSIS — E11.65 TYPE 2 DIABETES MELLITUS WITH HYPERGLYCEMIA, WITH LONG-TERM CURRENT USE OF INSULIN (HCC): ICD-10-CM

## 2019-09-18 DIAGNOSIS — F41.9 ANXIETY: ICD-10-CM

## 2019-09-18 DIAGNOSIS — F33.41 RECURRENT MAJOR DEPRESSIVE DISORDER, IN PARTIAL REMISSION (HCC): Primary | ICD-10-CM

## 2019-09-18 DIAGNOSIS — M79.605 PAIN OF LEFT LOWER EXTREMITY: ICD-10-CM

## 2019-09-18 DIAGNOSIS — Z79.4 TYPE 2 DIABETES MELLITUS WITH HYPERGLYCEMIA, WITH LONG-TERM CURRENT USE OF INSULIN (HCC): ICD-10-CM

## 2019-09-18 DIAGNOSIS — E78.2 MIXED HYPERLIPIDEMIA: ICD-10-CM

## 2019-09-18 LAB
AMPHETAMINE SCREEN, URINE: NORMAL
BARBITURATE SCREEN, URINE: NORMAL
BENZODIAZEPINE SCREEN, URINE: NORMAL
BUPRENORPHINE URINE: NORMAL
COCAINE METABOLITE SCREEN URINE: NORMAL
GABAPENTIN SCREEN, URINE: NORMAL
HBA1C MFR BLD: 10 % (ref 4–6)
MDMA URINE: NORMAL
METHADONE SCREEN, URINE: NORMAL
METHAMPHETAMINE, URINE: NORMAL
OPIATE SCREEN URINE: NORMAL
OXYCODONE SCREEN URINE: NORMAL
PHENCYCLIDINE SCREEN URINE: NORMAL
PROPOXYPHENE SCREEN, URINE: NORMAL
T4 FREE: 1 NG/DL (ref 0.9–1.7)
THC SCREEN, URINE: NORMAL
TRICYCLIC ANTIDEPRESSANTS, UR: NORMAL
TSH SERPL DL<=0.05 MIU/L-ACNC: 2.58 UIU/ML (ref 0.27–4.2)

## 2019-09-18 PROCEDURE — 80305 DRUG TEST PRSMV DIR OPT OBS: CPT | Performed by: FAMILY MEDICINE

## 2019-09-18 PROCEDURE — 99214 OFFICE O/P EST MOD 30 MIN: CPT | Performed by: FAMILY MEDICINE

## 2019-09-18 RX ORDER — ESCITALOPRAM OXALATE 20 MG/1
20 TABLET ORAL DAILY
Qty: 30 TABLET | Refills: 2 | Status: SHIPPED | OUTPATIENT
Start: 2019-09-18 | End: 2019-11-20 | Stop reason: SDUPTHER

## 2019-09-18 RX ORDER — ALPRAZOLAM 1 MG/1
TABLET ORAL
Qty: 60 TABLET | Refills: 2 | Status: SHIPPED | OUTPATIENT
Start: 2019-09-18 | End: 2019-12-16 | Stop reason: SDUPTHER

## 2019-09-18 NOTE — LETTER
MEDICATION AGREEMENT     Delmi Xiongtaras  [birth year]      For certain conditions, multiple classes of medications may be used to help better manage your symptoms, and to improve your ability to function at home, work and in social settings. However, these medications do have risks, which will be discussed with you, including addiction and dependency. The following prescribed medications need frequent monitoring and will require you to partner and assist in your healthcare. Medication  Dose, instructions and quantity as indicated on current prescription bottle Diagnosis/Reason(s) for Taking Category   Xanax 1 mg 1/2 to 1 q8 prn #60 2 refills   Anxiety                             Benefits and goals of Controlled Substance Medications: There are two potential goals for your treatment: (1) decreased pain and suffering (2) improved daily life functions. There are many possible treatments for your chronic condition(s), and, in addition to controlled substance medications, we will try alternatives such as physical therapy, yoga, massage, home daily exercise, meditation, relaxation techniques, injections, chiropractic manipulations, surgery, cognitive therapy, hypnosis and many medications that are not habit-forming. Use of controlled substance medications may be helpful, but they are unlikely to resolve all of your symptoms or restore all function. Risks of Controlled Substance Medications:    Opioid pain medications: These medications can lead to problems such as addiction/dependence, sedation, lightheadedness/dizziness, memory issues, falls, constipation, nausea, or vomiting. They may also impair the ability to drive or operate machinery. Additionally, these medications may lower testosterone levels, leading to loss of bone strength, stamina and sex drive.   They may cause problems with breathing, sleep apnea and reduced coughing, which are especially dangerous for patients with lung

## 2019-09-18 NOTE — PROGRESS NOTES
of breath. Cardiovascular: Negative for chest pain and palpitations. Gastrointestinal: Negative for abdominal pain, constipation, diarrhea, nausea and vomiting. Endocrine: Negative for cold intolerance and heat intolerance. Genitourinary: Negative for dysuria and hematuria. Musculoskeletal: Positive for arthralgias and back pain. Negative for gait problem and neck pain. Skin: Negative for rash and wound. Neurological: Negative for syncope and weakness. Hematological: Negative for adenopathy. Does not bruise/bleed easily. Psychiatric/Behavioral: Negative for dysphoric mood. The patient is nervous/anxious. Past Medical History:   Diagnosis Date    Anxiety     Arthritis     Bilateral cataracts     Chronic kidney disease     [facility]     Diabetes mellitus ([facility]     Diabetes mellitus ([facility] [facility]     Hyperlipidemia     Cholesterol management per pcp.  Hypertension     IBS (irritable bowel syndrome)     Neuropathy     Pericardial effusion     Peripheral vascular disease (HCC)     Smoker     Type 2 diabetes mellitus without complication (HCC)        Current Outpatient Medications   Medication Sig Dispense Refill    ALPRAZolam (XANAX) 1 MG tablet 1/2-1 tablet 2 times per day as need. 60 tablet 2    escitalopram (LEXAPRO) 20 MG tablet Take 1 tablet by mouth daily 30 tablet 2    HUMALOG KWIKPEN 100 UNIT/ML pen INJECT UNDER THE SKIN 40 UNITS THREE TIMES A DAY BEFORE MEALS. 15 mL 3    esomeprazole (NEXIUM) 40 MG delayed release capsule Take 1 capsule by mouth every morning (before breakfast) 30 capsule 5    Lancets MISC Test up to 3 times daily. 100 each 3    Insulin Pen Needle (PEN NEEDLES) 31G X 6 MM MISC 1 each by Does not apply route daily 100 each 3    blood glucose monitor strips Test up to 3 times a day & as needed for symptoms of irregular blood glucose.  100 strip 5    HYDROcodone-acetaminophen (NORCO) 7.5-325 MG per tablet Take 1 (TRESIBA FLEXTOUCH) 200 UNIT/ML SOPN Inject 100 Units into the skin nightly 5 pen 5    lisinopril (PRINIVIL;ZESTRIL) 10 MG tablet Take 2 tablets by mouth daily 30 tablet 5    tiotropium (SPIRIVA HANDIHALER) 18 MCG inhalation capsule Inhale 1 capsule into the lungs daily 30 capsule 5    naproxen (NAPROSYN) 500 MG tablet Take 1 tablet by mouth 2 times daily 20 tablet 0    promethazine-dextromethorphan (PROMETHAZINE-DM) 6.25-15 MG/5ML syrup Take 5 mLs by mouth 4 times daily as needed for Cough 180 mL 2    triamcinolone (KENALOG) 0.1 % cream Apply topically 2 times daily. 80 g 5    albuterol (ACCUNEB) 0.63 MG/3ML nebulizer solution Take 3 mLs by nebulization every 6 hours as needed for Wheezing 270 mL 5     No current facility-administered medications for this visit. Allergies   Allergen Reactions    Ceftin [Cefuroxime Axetil] Swelling    Codeine Nausea Only    Dye [Iodides] Swelling and Other (See Comments)     SOB    Iodinated Diagnostic Agents        Past Surgical History:   Procedure Laterality Date    APPENDECTOMY      CHOLECYSTECTOMY      COLONOSCOPY      ENDOSCOPY, COLON, DIAGNOSTIC      FINGER SURGERY      HYSTERECTOMY, TOTAL ABDOMINAL         Social History     Tobacco Use    Smoking status: Current Every Day Smoker     Packs/day: 1.00     Years: 40.00     Pack years: 40.00     Types: Cigarettes    Smokeless tobacco: Never Used    Tobacco comment: up to a pack a day   Substance Use Topics    Alcohol use: No    Drug use: No       Family History   Problem Relation Age of Onset    Cancer Mother     Cancer Father     Heart Failure Other     Heart Failure Maternal Grandfather        BP (!) 148/90 (Site: Right Upper Arm, Position: Sitting, Cuff Size: Medium Adult)   Pulse 111   Temp 97.8 °F (36.6 °C) (Oral)   Resp 20   Ht 5' 3\" (1.6 m)   Wt 163 lb (73.9 kg)   SpO2 98%   BMI 28.87 kg/m²     Physical Exam   Constitutional: She is oriented to person, place, and time.  She appears

## 2019-09-29 ASSESSMENT — ENCOUNTER SYMPTOMS
BACK PAIN: 1
RHINORRHEA: 0
ABDOMINAL PAIN: 0
SHORTNESS OF BREATH: 0
COUGH: 0
EYE PAIN: 0
CONSTIPATION: 0
DIARRHEA: 0
NAUSEA: 0
VOMITING: 0
EYE DISCHARGE: 0

## 2019-10-01 ENCOUNTER — OFFICE VISIT (OUTPATIENT)
Dept: ENDOCRINOLOGY | Facility: CLINIC | Age: 64
End: 2019-10-01

## 2019-10-01 VITALS
BODY MASS INDEX: 29.5 KG/M2 | OXYGEN SATURATION: 99 % | WEIGHT: 166.5 LBS | DIASTOLIC BLOOD PRESSURE: 88 MMHG | SYSTOLIC BLOOD PRESSURE: 126 MMHG | HEIGHT: 63 IN | HEART RATE: 105 BPM

## 2019-10-01 DIAGNOSIS — E11.59 HYPERTENSION ASSOCIATED WITH DIABETES (HCC): ICD-10-CM

## 2019-10-01 DIAGNOSIS — E55.9 VITAMIN D DEFICIENCY: ICD-10-CM

## 2019-10-01 DIAGNOSIS — E11.42 DIABETIC POLYNEUROPATHY ASSOCIATED WITH TYPE 2 DIABETES MELLITUS (HCC): ICD-10-CM

## 2019-10-01 DIAGNOSIS — E11.69 MIXED DIABETIC HYPERLIPIDEMIA ASSOCIATED WITH TYPE 2 DIABETES MELLITUS (HCC): ICD-10-CM

## 2019-10-01 DIAGNOSIS — E78.2 MIXED DIABETIC HYPERLIPIDEMIA ASSOCIATED WITH TYPE 2 DIABETES MELLITUS (HCC): ICD-10-CM

## 2019-10-01 DIAGNOSIS — IMO0002 UNCONTROLLED TYPE 2 DIABETES MELLITUS, WITHOUT LONG-TERM CURRENT USE OF INSULIN: Primary | ICD-10-CM

## 2019-10-01 DIAGNOSIS — Z72.0 TOBACCO USE: ICD-10-CM

## 2019-10-01 DIAGNOSIS — I15.2 HYPERTENSION ASSOCIATED WITH DIABETES (HCC): ICD-10-CM

## 2019-10-01 PROCEDURE — 99214 OFFICE O/P EST MOD 30 MIN: CPT | Performed by: INTERNAL MEDICINE

## 2019-10-01 RX ORDER — METFORMIN HYDROCHLORIDE 500 MG/1
TABLET, EXTENDED RELEASE ORAL
Qty: 120 TABLET | Refills: 11 | Status: SHIPPED | OUTPATIENT
Start: 2019-10-01 | End: 2021-03-08

## 2019-10-01 RX ORDER — CALCIPOTRIENE 50 UG/G
CREAM TOPICAL 2 TIMES DAILY
COMMUNITY

## 2019-10-01 NOTE — PROGRESS NOTES
Subjective    Yolanda Jacobs is a 64 y.o. female. she is here today for follow-up.    Diabetes   Pertinent negatives for hypoglycemia include no confusion, dizziness, headaches, pallor or tremors. Pertinent negatives for diabetes include no chest pain, no fatigue, no polydipsia, no polyphagia, no polyuria and no weakness.          Primary Care Provider     Job Palomo MD     Duration 10 years     Timing - Diabetes is Constant     Quality -  poorly controlled     Severity -  severe     Complications - peripheral neuropathy, CVA      Current symptoms/problems  paresthesia of the feet      Alleviating Factors: Compliance       Side Effects  none     Current diet  lower carb, stopped regular drinks        Current exercise none     Current monitoring regimen: home blood tests - 3 times daily    Component      Latest Ref Rng & Units 1/11/2019   Hemoglobin A1C      % 10.4           Home blood sugar records:   , checking 4 x daily         Hypoglycemia -- nocturnal hypoglycemia       She has had two surgeries for cyst removal     During surgery she had a CVA         The following portions of the patient's history were reviewed and updated as appropriate:   Past Medical History:   Diagnosis Date   • COPD (chronic obstructive pulmonary disease) (CMS/HCC)    • Diabetes mellitus (CMS/HCC)    • HTN (hypertension)    • Hyperlipidemia      Past Surgical History:   Procedure Laterality Date   • APPENDECTOMY     • CHOLECYSTECTOMY     • HYSTERECTOMY     • KNEE SURGERY       Family History   Problem Relation Age of Onset   • Diabetes Mother    • Cancer Mother    • Lung cancer Father    • No Known Problems Sister    • Cancer Brother      OB History     No data available        Current Outpatient Medications   Medication Sig Dispense Refill   • albuterol (ACCUNEB) 0.63 MG/3ML nebulizer solution Take 1 ampule by nebulization every 6 hours as needed for Wheezing     • Alcohol Swabs (ALCOHOL WIPES) pads Use 4 x daily 120  each 11   • ALPRAZolam (XANAX) 1 MG tablet Take 1 mg by mouth 3 (Three) Times a Day As Needed for Anxiety.     • amitriptyline (ELAVIL) 10 MG tablet Take 10 mg by mouth Every Night.     • aspirin 81 MG tablet Take 1 tablet by mouth Daily. 30 tablet 11   • atorvastatin (LIPITOR) 40 MG tablet Take 40 mg by mouth Daily.     • Baclofen 2 % cream Apply  topically 3 (Three) Times a Day.     • Blood Glucose Monitoring Suppl w/Device kit USE AS INDICATED, ANY MONITOR 1 each 1   • calcipotriene (DOVONEX) 0.005 % cream Apply  topically to the appropriate area as directed 2 (Two) Times a Day.     • cyclobenzaprine (FLEXERIL) 10 MG tablet Take 10 mg by mouth 3 (Three) Times a Day As Needed for Muscle Spasms.     • diltiaZEM CD (CARDIZEM CD) 180 MG 24 hr capsule Take 180 mg by mouth Daily.     • Empagliflozin-Metformin HCl ER (SYNJARDY XR) 5-1000 MG tablet sustained-release 24 hour Take 2 tablet/day by mouth Daily With Breakfast. 60 tablet 11   • esomeprazole (nexIUM) 40 MG capsule Take 40 mg by mouth Every Morning Before Breakfast.     • fluconazole (DIFLUCAN) 150 MG tablet Take 1 tablet by mouth 1 (One) Time Per Week. 4 tablet 6   • fluticasone-salmeterol (ADVAIR) 250-50 MCG/DOSE DISKUS Inhale 1 puff 2 (Two) Times a Day.     • furosemide (LASIX) 40 MG tablet Take 40 mg by mouth Daily.     • gabapentin (NEURONTIN) 400 MG capsule 2 capsules TID (Patient taking differently: 400 mg 4 (Four) Times a Day. 2 capsules TID) 180 capsule 5   • Gabapentin 10 % cream Apply 6 % topically 3 (Three) Times a Day.     • Glucose Blood (BLOOD GLUCOSE TEST) strip Use 4 x daily, use any brand covered by insurance or same brand as before 120 each 11   • HYDROcodone-acetaminophen (NORCO) 7.5-325 MG per tablet Take 1 tablet by mouth Every 4 (Four) Hours As Needed for Moderate Pain (4-6).     • Insulin Degludec (TRESIBA FLEXTOUCH) 200 UNIT/ML solution pen-injector Inject 100 Units under the skin Every Night. Up to 150 units daily 8 pen 11   • insulin  detemir (LEVEMIR) 100 UNIT/ML injection Inject 70 Units under the skin Every Morning.     • insulin detemir (LEVEMIR) 100 UNIT/ML injection Inject 86 Units under the skin Every Evening.     • Insulin Lispro (HUMALOG KWIKPEN) 200 UNIT/ML solution pen-injector Inject 40 Units under the skin 3 (Three) Times a Day With Meals. 6 pen 11   • Insulin Pen Needle (B-D UF III MINI PEN NEEDLES) 31G X 5 MM misc Use 4 times daily 120 each 11   • Lancet Devices (LANCING DEVICE) misc USE AS INDICATED TO CORRELATE WITH STRIPS AND METER 1 each 1   • Lancets 30G misc USE 4 X DAILY 120 each 11   • levothyroxine (SYNTHROID, LEVOTHROID) 25 MCG tablet Take 25 mcg by mouth Daily.     • Lidocaine HCl 2.5 % gel Apply  topically 3 (Three) Times a Day.     • lisinopril (PRINIVIL,ZESTRIL) 20 MG tablet Take 20 mg by mouth Daily.     • loratadine (CLARITIN) 10 MG tablet Take 10 mg by mouth Daily.     • sulfamethoxazole-trimethoprim (BACTRIM,SEPTRA) 400-80 MG tablet Take 1 tablet by mouth 2 (Two) Times a Day. 20 tablet 0   • TRULICITY 0.75 MG/0.5ML solution pen-injector INJECT 0.75 MG UNDER THE SKIN ON THE SAME DAY EACH WEEK. 2 mL 4   • varenicline (CHANTIX) 0.5 MG tablet Take 0.5 mg by mouth 2 (Two) Times a Day.       No current facility-administered medications for this visit.      Allergies   Allergen Reactions   • Ceftin [Cefuroxime Axetil]    • Iodinated Diagnostic Agents      Social History     Socioeconomic History   • Marital status:      Spouse name: Not on file   • Number of children: Not on file   • Years of education: Not on file   • Highest education level: Not on file   Tobacco Use   • Smoking status: Current Every Day Smoker     Packs/day: 0.50   • Smokeless tobacco: Never Used   Substance and Sexual Activity   • Alcohol use: No   • Drug use: No   • Sexual activity: Defer       Review of Systems  Review of Systems   Constitutional: Negative for activity change, appetite change, diaphoresis and fatigue.   HENT: Negative for  "facial swelling, sneezing, sore throat, tinnitus, trouble swallowing and voice change.    Eyes: Negative for photophobia, pain, discharge, redness, itching and visual disturbance.   Respiratory: Negative for apnea, cough, choking, chest tightness and shortness of breath.    Cardiovascular: Negative for chest pain, palpitations and leg swelling.   Gastrointestinal: Negative for abdominal distention, abdominal pain, constipation, diarrhea, nausea and vomiting.   Endocrine: Negative for cold intolerance, heat intolerance, polydipsia, polyphagia and polyuria.   Genitourinary: Negative for difficulty urinating, dysuria, frequency, hematuria and urgency.   Musculoskeletal: Negative for arthralgias, back pain, gait problem, joint swelling, myalgias, neck pain and neck stiffness.   Skin: Negative for color change, pallor, rash and wound.   Neurological: Negative for dizziness, tremors, weakness, light-headedness, numbness and headaches.   Hematological: Negative for adenopathy. Does not bruise/bleed easily.   Psychiatric/Behavioral: Negative for behavioral problems, confusion and sleep disturbance.        Objective    /88   Pulse 105   Ht 160 cm (63\")   Wt 75.5 kg (166 lb 8 oz)   SpO2 99%   BMI 29.49 kg/m²   Physical Exam   Constitutional: She is oriented to person, place, and time. She appears well-developed and well-nourished. No distress.   HENT:   Head: Normocephalic and atraumatic.   Right Ear: External ear normal.   Left Ear: External ear normal.   Nose: Nose normal.   Eyes: Conjunctivae and EOM are normal. Pupils are equal, round, and reactive to light.   Neck: Normal range of motion. Neck supple. No tracheal deviation present. No thyromegaly present.   Cardiovascular: Normal rate, regular rhythm and normal heart sounds.   No murmur heard.  Pulmonary/Chest: Effort normal and breath sounds normal. No respiratory distress. She has no wheezes.   Abdominal: Soft. Bowel sounds are normal. There is no " tenderness. There is no rebound and no guarding.   Musculoskeletal: Normal range of motion. She exhibits no edema, tenderness or deformity.   Neurological: She is alert and oriented to person, place, and time. No cranial nerve deficit.   Skin: Skin is warm and dry. No rash noted.   Psychiatric: She has a normal mood and affect. Her behavior is normal. Judgment and thought content normal.       Lab Review  Glucose (mg/dL)   Date Value   07/27/2017 360 (H)     Sodium (mmol/L)   Date Value   07/27/2017 134 (L)     Potassium (mmol/L)   Date Value   07/27/2017 4.1     Chloride (mmol/L)   Date Value   07/27/2017 101     CO2 (mmol/L)   Date Value   07/27/2017 23.0 (L)     BUN (mg/dL)   Date Value   07/27/2017 15     Creatinine (mg/dL)   Date Value   07/27/2017 0.88     Hemoglobin A1C (%)   Date Value   09/18/2019 10.0 (H)   01/11/2019 10.4   10/12/2018 11.9 (H)     Triglycerides (mg/dL)   Date Value   10/12/2018 152 (H)     LDL Cholesterol  (mg/dL)   Date Value   10/12/2018 109       Assessment/Plan      1. Uncontrolled type 2 diabetes mellitus, without long-term current use of insulin (CMS/[facility])    2. Diabetic polyneuropathy associated with type 2 diabetes mellitus (CMS/[facility])    3. Hypertension associated with diabetes (CMS/[facility])    4. Mixed diabetic hyperlipidemia associated with type 2 diabetes mellitus (CMS/[facility])    5. Vitamin D deficiency     6. Tobacco use    .    Medications prescribed:  Outpatient Encounter Medications as of 10/1/2019   Medication Sig Dispense Refill   • albuterol (ACCUNEB) 0.63 MG/3ML nebulizer solution Take 1 ampule by nebulization every 6 hours as needed for Wheezing     • Alcohol Swabs (ALCOHOL WIPES) pads Use 4 x daily 120 each 11   • ALPRAZolam (XANAX) 1 MG tablet Take 1 mg by mouth 3 (Three) Times a Day As Needed for Anxiety.     • amitriptyline (ELAVIL) 10 MG tablet Take 10 mg by mouth Every Night.     • aspirin 81 MG tablet Take 1 tablet by mouth Daily. 30 tablet 11   • atorvastatin (LIPITOR)  40 MG tablet Take 40 mg by mouth Daily.     • Baclofen 2 % cream Apply  topically 3 (Three) Times a Day.     • Blood Glucose Monitoring Suppl w/Device kit USE AS INDICATED, ANY MONITOR 1 each 1   • calcipotriene (DOVONEX) 0.005 % cream Apply  topically to the appropriate area as directed 2 (Two) Times a Day.     • cyclobenzaprine (FLEXERIL) 10 MG tablet Take 10 mg by mouth 3 (Three) Times a Day As Needed for Muscle Spasms.     • diltiaZEM CD (CARDIZEM CD) 180 MG 24 hr capsule Take 180 mg by mouth Daily.     • Empagliflozin-Metformin HCl ER (SYNJARDY XR) 5-1000 MG tablet sustained-release 24 hour Take 2 tablet/day by mouth Daily With Breakfast. 60 tablet 11   • esomeprazole (nexIUM) 40 MG capsule Take 40 mg by mouth Every Morning Before Breakfast.     • fluconazole (DIFLUCAN) 150 MG tablet Take 1 tablet by mouth 1 (One) Time Per Week. 4 tablet 6   • fluticasone-salmeterol (ADVAIR) 250-50 MCG/DOSE DISKUS Inhale 1 puff 2 (Two) Times a Day.     • furosemide (LASIX) 40 MG tablet Take 40 mg by mouth Daily.     • gabapentin (NEURONTIN) 400 MG capsule 2 capsules TID (Patient taking differently: 400 mg 4 (Four) Times a Day. 2 capsules TID) 180 capsule 5   • Gabapentin 10 % cream Apply 6 % topically 3 (Three) Times a Day.     • Glucose Blood (BLOOD GLUCOSE TEST) strip Use 4 x daily, use any brand covered by insurance or same brand as before 120 each 11   • HYDROcodone-acetaminophen (NORCO) 7.5-325 MG per tablet Take 1 tablet by mouth Every 4 (Four) Hours As Needed for Moderate Pain (4-6).     • Insulin Degludec (TRESIBA FLEXTOUCH) 200 UNIT/ML solution pen-injector Inject 100 Units under the skin Every Night. Up to 150 units daily 8 pen 11   • insulin detemir (LEVEMIR) 100 UNIT/ML injection Inject 70 Units under the skin Every Morning.     • insulin detemir (LEVEMIR) 100 UNIT/ML injection Inject 86 Units under the skin Every Evening.     • Insulin Lispro (HUMALOG KWIKPEN) 200 UNIT/ML solution pen-injector Inject 40 Units under  the skin 3 (Three) Times a Day With Meals. 6 pen 11   • Insulin Pen Needle (B-D UF III MINI PEN NEEDLES) 31G X 5 MM misc Use 4 times daily 120 each 11   • Lancet Devices (LANCING DEVICE) misc USE AS INDICATED TO CORRELATE WITH STRIPS AND METER 1 each 1   • Lancets 30G misc USE 4 X DAILY 120 each 11   • levothyroxine (SYNTHROID, LEVOTHROID) 25 MCG tablet Take 25 mcg by mouth Daily.     • Lidocaine HCl 2.5 % gel Apply  topically 3 (Three) Times a Day.     • lisinopril (PRINIVIL,ZESTRIL) 20 MG tablet Take 20 mg by mouth Daily.     • loratadine (CLARITIN) 10 MG tablet Take 10 mg by mouth Daily.     • sulfamethoxazole-trimethoprim (BACTRIM,SEPTRA) 400-80 MG tablet Take 1 tablet by mouth 2 (Two) Times a Day. 20 tablet 0   • TRULICITY 0.75 MG/0.5ML solution pen-injector INJECT 0.75 MG UNDER THE SKIN ON THE SAME DAY EACH WEEK. 2 mL 4   • varenicline (CHANTIX) 0.5 MG tablet Take 0.5 mg by mouth 2 (Two) Times a Day.       No facility-administered encounter medications on file as of 10/1/2019.        Orders placed during this encounter include:  No orders of the defined types were placed in this encounter.    Glycemic Management    Lab Results   Component Value Date    HGBA1C 10.0 (H) 09/18/2019    HGBA1C 10.4 01/11/2019    HGBA1C 11.9 (H) 10/12/2018     Lab Results   Component Value Date    CREATININE 0.88 07/27/2017               Metformin 1 gram once daily with lunch --stopped   Having frequent GMI - I will still add jardiance and combine with metformin       synjardy xr5/1000, 2 tabs w bkfast and add diflucan 150 mg weekly          trulicity 0.75 mg weekly - taking             Tresiba -- taking 100 units - decrease to 70              Humalog , doing 20 , increase to 30       Approve for  Insulin pump and or Continuous Glucose Sensor     #1  Patient has diabetes mellitus, insulin-dependent.    #2 She performs blood glucose testing at least times daily and blood glucose log was brought to office with variability from  .    #3  She is requiring  Basal insulin  and Prandial Insulin for a total of at least  4 injections per day and has been doing this for at least 6 months     #4 Patient tests blood sugars at least 4 times daily and makes frequent self-adjustments and patient is injecting insulin at least 4 times daily. She has been doing this for more than 6 months . She tests frequently due to hypoglycemia and hyperglycemia.     #5 I have personally seen patient within the past 6 months    #6 We plan on seeing her every 2-3 months for continuous adjustment of her diabetes regimen     #7 patient has hypoglycemia with episodes of unawareness.    #8 patient has day-to-day variation in her mealtime which confounds the degree of insulin dosing with multiple daily injections.    #9 patient has completed diabetes education program with us.    #10 she has demonstrated the ability to self monitor her glucose.        #11 Patient is motivated in improving  diabetes control                Lipid Management        On lipitor 40 mg qhs      Blood Pressure Management:             bp controlled on ace I regimen            Microvascular Complication Monitoring:       Eye Exam Evaluation, within 1 year      -----------     Last Microalbumin-Proteinuria Assessment     No results found for: MALBCRERATIO     No results found for: UTPCR     -----------        Neuropathy, yes . On neurontin 300 mg tid, may increase up to 900 mg tid --change to 400 mg capsule two TID         Immunizations:             Preventive Care:       I advised Yolanda of the risks of continuing to use tobacco, and I provided her with tobacco cessation educational materials in the After Visit Summary.     During this visit, I spent less than 3  minutes counseling the patient regarding tobacco cessation.             Weight Related:       Patient's Body mass index is 29.49 kg/m². BMI is above normal parameters. Recommendations include: educational material.             Diet  interventions: moderate (500 kCal/d) deficit diet.        Bone Health               Lab Results   Component Value Date     CALCIUM 9.6 07/27/2017         Thyroid Health     No results found for: TSH                 Other Diabetes Related Aspects         No results found for: [id number]         4. Follow-up: No Follow-up on file.

## 2019-10-02 ENCOUNTER — TELEPHONE (OUTPATIENT)
Dept: FAMILY MEDICINE CLINIC | Facility: CLINIC | Age: 64
End: 2019-10-02

## 2019-10-02 NOTE — TELEPHONE ENCOUNTER
Patient stated mark fell out. Instructed patient to call [facility] for replacement. We are in process of submitting for personal mark and omnipod.

## 2019-10-03 ENCOUNTER — TELEPHONE (OUTPATIENT)
Dept: ENDOCRINOLOGY | Facility: CLINIC | Age: 64
End: 2019-10-03

## 2019-10-17 ENCOUNTER — OFFICE VISIT (OUTPATIENT)
Dept: FAMILY MEDICINE CLINIC | Age: 64
End: 2019-10-17
Payer: MEDICARE

## 2019-10-17 VITALS
HEART RATE: 98 BPM | SYSTOLIC BLOOD PRESSURE: 152 MMHG | BODY MASS INDEX: 29.59 KG/M2 | HEIGHT: 63 IN | TEMPERATURE: 96 F | OXYGEN SATURATION: 96 % | DIASTOLIC BLOOD PRESSURE: 86 MMHG | WEIGHT: 167 LBS | RESPIRATION RATE: 16 BRPM

## 2019-10-17 DIAGNOSIS — Z23 NEEDS FLU SHOT: ICD-10-CM

## 2019-10-17 DIAGNOSIS — Z12.31 ENCOUNTER FOR SCREENING MAMMOGRAM FOR BREAST CANCER: ICD-10-CM

## 2019-10-17 DIAGNOSIS — Z79.899 MEDICATION MANAGEMENT: ICD-10-CM

## 2019-10-17 DIAGNOSIS — S61.401D: Primary | ICD-10-CM

## 2019-10-17 DIAGNOSIS — I10 ESSENTIAL HYPERTENSION: ICD-10-CM

## 2019-10-17 DIAGNOSIS — H54.3 VISION LOSS, BILATERAL: ICD-10-CM

## 2019-10-17 DIAGNOSIS — Z12.11 SCREENING FOR MALIGNANT NEOPLASM OF COLON: ICD-10-CM

## 2019-10-17 DIAGNOSIS — E78.2 MIXED HYPERLIPIDEMIA: ICD-10-CM

## 2019-10-17 DIAGNOSIS — F41.9 ANXIETY: ICD-10-CM

## 2019-10-17 LAB
AMPHETAMINE SCREEN, URINE: NORMAL
BARBITURATE SCREEN, URINE: NORMAL
BENZODIAZEPINE SCREEN, URINE: NORMAL
BUPRENORPHINE URINE: NORMAL
COCAINE METABOLITE SCREEN URINE: NORMAL
GABAPENTIN SCREEN, URINE: NORMAL
MDMA URINE: NORMAL
METHADONE SCREEN, URINE: NORMAL
METHAMPHETAMINE, URINE: NORMAL
OPIATE SCREEN URINE: NORMAL
OXYCODONE SCREEN URINE: NORMAL
PHENCYCLIDINE SCREEN URINE: NORMAL
PROPOXYPHENE SCREEN, URINE: NORMAL
THC SCREEN, URINE: NORMAL
TRICYCLIC ANTIDEPRESSANTS, UR: NORMAL

## 2019-10-17 PROCEDURE — G0008 ADMIN INFLUENZA VIRUS VAC: HCPCS | Performed by: FAMILY MEDICINE

## 2019-10-17 PROCEDURE — 99214 OFFICE O/P EST MOD 30 MIN: CPT | Performed by: FAMILY MEDICINE

## 2019-10-17 PROCEDURE — 80305 DRUG TEST PRSMV DIR OPT OBS: CPT | Performed by: FAMILY MEDICINE

## 2019-10-17 PROCEDURE — 90686 IIV4 VACC NO PRSV 0.5 ML IM: CPT | Performed by: FAMILY MEDICINE

## 2019-10-17 RX ORDER — LISINOPRIL 40 MG/1
40 TABLET ORAL DAILY
Qty: 30 TABLET | Refills: 2 | Status: SHIPPED | OUTPATIENT
Start: 2019-10-17 | End: 2019-12-23

## 2019-10-17 RX ORDER — MUPIROCIN CALCIUM 20 MG/G
CREAM TOPICAL
Qty: 1 TUBE | Refills: 1 | Status: SHIPPED | OUTPATIENT
Start: 2019-10-17 | End: 2019-11-16

## 2019-10-22 ENCOUNTER — TELEPHONE (OUTPATIENT)
Dept: FAMILY MEDICINE CLINIC | Facility: CLINIC | Age: 64
End: 2019-10-22

## 2019-10-22 NOTE — TELEPHONE ENCOUNTER
Patient said she can't remember if she needs to still take the tresiba, sinces the changes at her last appt. Confused on the orders  [phone]  Thank You

## 2019-10-22 NOTE — TELEPHONE ENCOUNTER
Continue Tresiba 70 units  Humalog 30 un with meals  And continue trulicity    Call [place] regarding brad sensor order. Number provided.

## 2019-10-22 NOTE — TELEPHONE ENCOUNTER
Patient said she can't remember if she needs to still take the tresiba, sinces the changes at her last appt. Confused on the orders  [phone]  Thank You        Nedra conley

## 2019-10-23 ENCOUNTER — TELEPHONE (OUTPATIENT)
Dept: ENDOCRINOLOGY | Facility: CLINIC | Age: 64
End: 2019-10-23

## 2019-10-24 ASSESSMENT — ENCOUNTER SYMPTOMS
BACK PAIN: 1
COUGH: 0
CONSTIPATION: 0
ABDOMINAL PAIN: 0
EYE DISCHARGE: 0
NAUSEA: 0
DIARRHEA: 0
VOMITING: 0
RHINORRHEA: 0
EYE PAIN: 0
SHORTNESS OF BREATH: 0

## 2019-11-12 ENCOUNTER — TELEPHONE (OUTPATIENT)
Dept: FAMILY MEDICINE CLINIC | Facility: CLINIC | Age: 64
End: 2019-11-12

## 2019-11-12 NOTE — TELEPHONE ENCOUNTER
Pt left a voicemail saying that she needs a call back concerning a form that needs to be faxed in for medical necessity for her pump. Please call, thanks!

## 2019-11-13 ENCOUNTER — TELEPHONE (OUTPATIENT)
Dept: ENDOCRINOLOGY | Facility: CLINIC | Age: 64
End: 2019-11-13

## 2019-11-13 NOTE — TELEPHONE ENCOUNTER
Pt stated medicare will pay but not medicaid. Stated [facility] stated it would be $3,000/month since Medicaid doesn't want to cover. She stated they need a CMN or PA or something like that stating medical necessity for pump use. Will ask Roopa Redmond, if there is a certain form to be completed.

## 2019-11-20 ENCOUNTER — TELEPHONE (OUTPATIENT)
Dept: FAMILY MEDICINE CLINIC | Age: 64
End: 2019-11-20

## 2019-11-20 RX ORDER — FUROSEMIDE 40 MG/1
TABLET ORAL
Qty: 60 TABLET | Refills: 5 | Status: SHIPPED | OUTPATIENT
Start: 2019-11-20 | End: 2020-06-17

## 2019-11-27 ENCOUNTER — TELEPHONE (OUTPATIENT)
Dept: FAMILY MEDICINE CLINIC | Facility: CLINIC | Age: 64
End: 2019-11-27

## 2019-12-13 ENCOUNTER — TELEPHONE (OUTPATIENT)
Dept: FAMILY MEDICINE CLINIC | Facility: CLINIC | Age: 64
End: 2019-12-13

## 2019-12-13 ENCOUNTER — TELEPHONE (OUTPATIENT)
Dept: ENDOCRINOLOGY | Facility: CLINIC | Age: 64
End: 2019-12-13

## 2019-12-13 NOTE — TELEPHONE ENCOUNTER
Pt needs to sharmaine for the 14 day Mark can you give her the number again .   [phone]  Thank You

## 2019-12-16 ENCOUNTER — OFFICE VISIT (OUTPATIENT)
Dept: FAMILY MEDICINE CLINIC | Age: 64
End: 2019-12-16
Payer: MEDICARE

## 2019-12-16 VITALS
HEIGHT: 63 IN | HEART RATE: 94 BPM | DIASTOLIC BLOOD PRESSURE: 84 MMHG | WEIGHT: 163 LBS | RESPIRATION RATE: 16 BRPM | SYSTOLIC BLOOD PRESSURE: 130 MMHG | BODY MASS INDEX: 28.88 KG/M2 | TEMPERATURE: 98.5 F

## 2019-12-16 DIAGNOSIS — E11.65 TYPE 2 DIABETES MELLITUS WITH HYPERGLYCEMIA, WITH LONG-TERM CURRENT USE OF INSULIN (HCC): ICD-10-CM

## 2019-12-16 DIAGNOSIS — Z79.4 TYPE 2 DIABETES MELLITUS WITH HYPERGLYCEMIA, WITH LONG-TERM CURRENT USE OF INSULIN (HCC): ICD-10-CM

## 2019-12-16 DIAGNOSIS — M54.50 LOW BACK PAIN, UNSPECIFIED BACK PAIN LATERALITY, UNSPECIFIED CHRONICITY, UNSPECIFIED WHETHER SCIATICA PRESENT: ICD-10-CM

## 2019-12-16 DIAGNOSIS — N30.01 ACUTE CYSTITIS WITH HEMATURIA: ICD-10-CM

## 2019-12-16 DIAGNOSIS — F41.9 ANXIETY: Primary | ICD-10-CM

## 2019-12-16 DIAGNOSIS — E03.9 ACQUIRED HYPOTHYROIDISM: ICD-10-CM

## 2019-12-16 LAB
BACTERIA: ABNORMAL /HPF
BILIRUBIN URINE: NEGATIVE
BLOOD, URINE: ABNORMAL
CLARITY: ABNORMAL
COLOR: YELLOW
EPITHELIAL CELLS, UA: 2 /HPF (ref 0–5)
GLUCOSE URINE: NEGATIVE MG/DL
HBA1C MFR BLD: 8.6 %
HYALINE CASTS: 1 /HPF (ref 0–8)
KETONES, URINE: NEGATIVE MG/DL
LEUKOCYTE ESTERASE, URINE: NEGATIVE
NITRITE, URINE: NEGATIVE
PH UA: 6 (ref 5–8)
PROTEIN UA: >=300 MG/DL
RBC UA: 2 /HPF (ref 0–4)
SPECIFIC GRAVITY UA: 1.02 (ref 1–1.03)
URINE REFLEX TO CULTURE: YES
URINE TYPE: ABNORMAL
UROBILINOGEN, URINE: 0.2 E.U./DL
WBC UA: 14 /HPF (ref 0–5)

## 2019-12-16 PROCEDURE — 83036 HEMOGLOBIN GLYCOSYLATED A1C: CPT | Performed by: FAMILY MEDICINE

## 2019-12-16 PROCEDURE — 99214 OFFICE O/P EST MOD 30 MIN: CPT | Performed by: FAMILY MEDICINE

## 2019-12-16 RX ORDER — NITROFURANTOIN 25; 75 MG/1; MG/1
100 CAPSULE ORAL 2 TIMES DAILY
Qty: 10 CAPSULE | Refills: 0 | Status: SHIPPED | OUTPATIENT
Start: 2019-12-16 | End: 2019-12-21

## 2019-12-16 RX ORDER — LEVOTHYROXINE SODIUM 0.03 MG/1
25 TABLET ORAL DAILY
Qty: 90 TABLET | Refills: 1 | Status: CANCELLED | OUTPATIENT
Start: 2019-12-16

## 2019-12-16 RX ORDER — ALPRAZOLAM 1 MG/1
TABLET ORAL
Qty: 60 TABLET | Refills: 2 | Status: SHIPPED | OUTPATIENT
Start: 2019-12-16 | End: 2020-03-17

## 2019-12-16 RX ORDER — LEVOTHYROXINE SODIUM 0.03 MG/1
25 TABLET ORAL DAILY
Qty: 90 TABLET | Refills: 1 | Status: SHIPPED | OUTPATIENT
Start: 2019-12-16 | End: 2020-06-17

## 2019-12-16 RX ORDER — ALPRAZOLAM 1 MG/1
TABLET ORAL
Qty: 60 TABLET | Refills: 2 | Status: CANCELLED | OUTPATIENT
Start: 2019-12-16 | End: 2020-03-17

## 2019-12-16 ASSESSMENT — ENCOUNTER SYMPTOMS
RHINORRHEA: 0
CONSTIPATION: 0
SHORTNESS OF BREATH: 0
EYE DISCHARGE: 0
ABDOMINAL PAIN: 0
DIARRHEA: 0
VOMITING: 0
BACK PAIN: 1
NAUSEA: 0
EYE PAIN: 0
COUGH: 0

## 2019-12-18 ENCOUNTER — TELEPHONE (OUTPATIENT)
Dept: FAMILY MEDICINE CLINIC | Facility: CLINIC | Age: 64
End: 2019-12-18

## 2019-12-18 NOTE — TELEPHONE ENCOUNTER
Pt needs a call back with the number for Omnipod. She has missplaced the other one, said to please call after 11. Thanks!

## 2019-12-19 LAB
ORGANISM: ABNORMAL
URINE CULTURE, ROUTINE: ABNORMAL
URINE CULTURE, ROUTINE: ABNORMAL

## 2019-12-20 DIAGNOSIS — K21.9 GASTROESOPHAGEAL REFLUX DISEASE WITHOUT ESOPHAGITIS: ICD-10-CM

## 2019-12-20 DIAGNOSIS — I10 ESSENTIAL HYPERTENSION: ICD-10-CM

## 2019-12-20 RX ORDER — DULAGLUTIDE 0.75 MG/.5ML
INJECTION, SOLUTION SUBCUTANEOUS
Qty: 2 ML | Refills: 4 | Status: SHIPPED | OUTPATIENT
Start: 2019-12-20 | End: 2020-06-17

## 2019-12-23 RX ORDER — LISINOPRIL 40 MG/1
TABLET ORAL
Qty: 30 TABLET | Refills: 5 | Status: SHIPPED | OUTPATIENT
Start: 2019-12-23 | End: 2020-06-17

## 2019-12-23 RX ORDER — ESOMEPRAZOLE MAGNESIUM 40 MG/1
CAPSULE, DELAYED RELEASE ORAL
Qty: 30 CAPSULE | Refills: 5 | Status: SHIPPED | OUTPATIENT
Start: 2019-12-23 | End: 2020-06-17

## 2020-01-20 RX ORDER — INSULIN LISPRO 100 [IU]/ML
INJECTION, SOLUTION INTRAVENOUS; SUBCUTANEOUS
Qty: 15 ML | Refills: 3 | Status: SHIPPED | OUTPATIENT
Start: 2020-01-20 | End: 2020-06-17

## 2020-03-17 ENCOUNTER — OFFICE VISIT (OUTPATIENT)
Dept: FAMILY MEDICINE CLINIC | Age: 65
End: 2020-03-17
Payer: MEDICARE

## 2020-03-17 VITALS
OXYGEN SATURATION: 98 % | SYSTOLIC BLOOD PRESSURE: 128 MMHG | RESPIRATION RATE: 16 BRPM | WEIGHT: 167 LBS | TEMPERATURE: 97.7 F | HEART RATE: 76 BPM | BODY MASS INDEX: 29.59 KG/M2 | DIASTOLIC BLOOD PRESSURE: 84 MMHG | HEIGHT: 63 IN

## 2020-03-17 PROCEDURE — 99214 OFFICE O/P EST MOD 30 MIN: CPT | Performed by: FAMILY MEDICINE

## 2020-03-17 RX ORDER — ALBUTEROL SULFATE 90 UG/1
2 AEROSOL, METERED RESPIRATORY (INHALATION) EVERY 6 HOURS PRN
Qty: 1 INHALER | Refills: 5 | Status: SHIPPED | OUTPATIENT
Start: 2020-03-17 | End: 2020-08-18

## 2020-03-17 RX ORDER — ALPRAZOLAM 1 MG/1
TABLET ORAL
Qty: 60 TABLET | Refills: 1 | Status: SHIPPED | OUTPATIENT
Start: 2020-04-17 | End: 2020-05-17

## 2020-03-17 RX ORDER — ALPRAZOLAM 1 MG/1
TABLET ORAL
Qty: 60 TABLET | Refills: 0 | Status: SHIPPED | OUTPATIENT
Start: 2020-03-17 | End: 2020-03-17 | Stop reason: SDUPTHER

## 2020-03-17 RX ORDER — CETIRIZINE HYDROCHLORIDE 10 MG/1
10 TABLET ORAL DAILY
Qty: 30 TABLET | Refills: 2 | Status: SHIPPED | OUTPATIENT
Start: 2020-03-17 | End: 2020-05-19

## 2020-03-17 RX ORDER — TIOTROPIUM BROMIDE 18 UG/1
18 CAPSULE ORAL; RESPIRATORY (INHALATION) DAILY
Qty: 30 CAPSULE | Refills: 5 | Status: SHIPPED | OUTPATIENT
Start: 2020-03-17 | End: 2020-08-18

## 2020-03-17 ASSESSMENT — ENCOUNTER SYMPTOMS
COUGH: 1
ABDOMINAL PAIN: 0
VOMITING: 0
SHORTNESS OF BREATH: 0
EYE PAIN: 0
EYE DISCHARGE: 0
DIARRHEA: 0
NAUSEA: 0
BACK PAIN: 1
CONSTIPATION: 0
RHINORRHEA: 1

## 2020-03-17 ASSESSMENT — PATIENT HEALTH QUESTIONNAIRE - PHQ9
1. LITTLE INTEREST OR PLEASURE IN DOING THINGS: 0
SUM OF ALL RESPONSES TO PHQ QUESTIONS 1-9: 1
2. FEELING DOWN, DEPRESSED OR HOPELESS: 1
SUM OF ALL RESPONSES TO PHQ9 QUESTIONS 1 & 2: 1
SUM OF ALL RESPONSES TO PHQ QUESTIONS 1-9: 1

## 2020-03-17 NOTE — PROGRESS NOTES
Clayton Jones is a 60 y.o. female who presents today for   Chief Complaint   Patient presents with    3 Month Follow-Up    Anxiety    Cough    Diabetes       Chief Complaint: Follow-up    HPI  Patient presents for follow-up on her anxiety and reports that she is doing well with her as needed use of Xanax. It was discussed with her that I have already sent in a month prescription in anticipation of trying to reschedule her appointment due to the COVID 19 threats and current governmental implementations in efforts to reduce or prevent spread. She states that she called back but the personally talked to do nothing about while she was called originally resulting in her presentation anyway. She states that she cannot have any problems with her anxiety and that her medicine seems to be doing well. She does have a history of type 2 diabetes and was previously noted to have improvement in her hemoglobin A1c though still uncontrolled. She has been doing well with her diet is made a lot of dietary changes that she feels are going to be beneficial for her diabetes moving forward. She denies any new changes or problems with her diabetes or any issues with her medication. She has a history of arterial hypertension has been doing well with her current medication. She denies any issues with use of medicine. She denies any symptoms from abnormal blood pressures. She does attempt avoid excess salt intake. She also has hyperlipidemia is tolerating Lipitor without any new myalgias or GI upsets. She is attempting to watch her diet and try to stay physically active. She does have a history of COPD and reports that she is doing well with her current inhalers. She has had a little bit of cough recently but otherwise denies any significant problems other than some drainage associated with the cough. Review of Systems   Constitutional: Negative for activity change, appetite change, fatigue and fever.    HENT: Positive for congestion and rhinorrhea. Eyes: Negative for pain and discharge. Respiratory: Positive for cough. Negative for shortness of breath. Cardiovascular: Negative for chest pain and palpitations. Gastrointestinal: Negative for abdominal pain, constipation, diarrhea, nausea and vomiting. Endocrine: Negative for cold intolerance and heat intolerance. Genitourinary: Negative for dysuria, frequency, hematuria and urgency. Musculoskeletal: Positive for arthralgias and back pain. Negative for gait problem and neck pain. Skin: Negative for rash and wound. Neurological: Negative for syncope and weakness. Hematological: Negative for adenopathy. Does not bruise/bleed easily. Psychiatric/Behavioral: Negative for dysphoric mood. The patient is nervous/anxious. Past Medical History:   Diagnosis Date    Anxiety     Arthritis     Bilateral cataracts     Chronic kidney disease     [facility]     Diabetes mellitus [facility]     Diabetes mellitus ([facility]     Hyperlipidemia     Cholesterol management per pcp.      Hypertension     IBS (irritable bowel syndrome)     Neuropathy     Pericardial effusion     Peripheral vascular disease (HCC)     Smoker     Type 2 diabetes mellitus without complication (HCC)        Current Outpatient Medications   Medication Sig Dispense Refill    [START ON 4/17/2020] ALPRAZolam (XANAX) 1 MG tablet TAKE 1/2 TO 1 TABLET BY MOUTH TWO TIMES A DAY AS NEEDED 60 tablet 1    tiotropium (SPIRIVA HANDIHALER) 18 MCG inhalation capsule Inhale 1 capsule into the lungs daily 30 capsule 5    albuterol sulfate HFA (PROVENTIL HFA) 108 (90 Base) MCG/ACT inhaler Inhale 2 puffs into the lungs every 6 hours as needed for Wheezing 1 Inhaler 5    cetirizine (ZYRTEC) 10 MG tablet Take 1 tablet by mouth daily 30 tablet 2    escitalopram (LEXAPRO) 20 MG tablet TAKE 1 TABLET BY MOUTH 1 TIME DAILY 30 tablet 2    HUMALOG KWIKPEN 100 UNIT/ML SOPN INJECT UNDER THE SKIN 40 UNITS THREE TIMES A DAY BEFORE MEALS. 15 mL 3    esomeprazole (NEXIUM) 40 MG delayed release capsule TAKE 1 CAPSULE BY MOUTH IN THE MORNING BEFORE BREAKFAST 30 capsule 5    lisinopril (PRINIVIL;ZESTRIL) 40 MG tablet TAKE 1 TABLET BY MOUTH EVERY DAY 30 tablet 5    levothyroxine (SYNTHROID) 25 MCG tablet Take 1 tablet by mouth daily 90 tablet 1    furosemide (LASIX) 40 MG tablet TAKE 1 TABLET BY MOUTH TWO TIMES A DAY 60 tablet 5    Lancets MISC Test up to 3 times daily. 100 each 3    Insulin Pen Needle (PEN NEEDLES) 31G X 6 MM MISC 1 each by Does not apply route daily 100 each 3    blood glucose monitor strips Test up to 3 times a day & as needed for symptoms of irregular blood glucose. 100 strip 5    HYDROcodone-acetaminophen (NORCO) 7.5-325 MG per tablet Take 1 tablet by mouth every 8 hours as needed for Pain. 90 tablet 0    gabapentin (NEURONTIN) 400 MG capsule Take 2 capsules by mouth 3 times daily.  180 capsule 1    montelukast (SINGULAIR) 10 MG tablet Take 1 tablet by mouth nightly 30 tablet 0    promethazine (PHENERGAN) 25 MG tablet TAKE 1 TABLET BY MOUTH EVERY 6 HOURS AS NEEDED FOR NAUSEA AND VOMITING 45 tablet 1    atorvastatin (LIPITOR) 40 MG tablet TAKE 1 TABLET BY MOUTH 1 TIME DAILY 30 tablet 5    NOVOLOG FLEXPEN 100 UNIT/ML injection pen INJECT UNDER THE SKIN 40 UNITS THREE TIMES A DAY BEFORE MEALS 15 mL 3    TRESIBA FLEXTOUCH 200 UNIT/ML SOPN INJECT 100 UNITS SUBQ IN THE EVENING 18 mL 5    diltiazem (DILT-XR) 180 MG extended release capsule TAKE 1 CAPSULE BY MOUTH 1 TIME DAILY 30 capsule 5    omeprazole (PRILOSEC) 20 MG delayed release capsule Take 1 capsule by mouth daily 30 capsule 5    RAYALDEE 30 MCG CPCR       famotidine (PEPCID) 40 MG tablet Take 40 mg by mouth daily       aspirin (KSENIA ASPIRIN) 325 MG tablet Take 1 tablet by mouth daily 30 tablet 3    nicotine (NICODERM CQ) 14 MG/24HR Place 1 patch onto the skin every 24 hours 30 patch 3    Thought Content: Thought content normal.         Assessment:       ICD-10-CM    1. Type 2 diabetes mellitus with hyperglycemia, with long-term current use of insulin ([facility]) E11.65 Hemoglobin A1C    Z79.4 Discussed importance of DM diet and benefits of exercise. Discussed proper use of medication. Stressed proper foot care and monitoring. Discussed the importance of yearly eye exams. 2. Anxiety F41.9 ALPRAZolam (XANAX) 1 MG tablet    Doing well with current dose of Xanax. Will continue continue to monitor. Stressed importance of as needed use only. 3. Chronic obstructive pulmonary disease, unspecified COPD type [facility] J44.9 tiotropium (SPIRIVA HANDIHALER) 18 MCG inhalation capsule     albuterol sulfate HFA (PROVENTIL HFA) 108 (90 Base) MCG/ACT inhaler    Doing well with current inhalers. Will continue continue to monitor. 4. Posterior rhinorrhea J34.89 cetirizine (ZYRTEC) 10 MG tablet     5. Essential hypertension I10  controlled with current medications. Will continue continue to monitor. 6. Mixed hyperlipidemia E78.2  tolerating Lipitor. Will continue and adjust course dictates. Plan:  Discussed proper use of medication. Discussed signs and symptoms requiring medical attention. All questions were answered and patient voiced understanding and agreement with plan as discussed.     Orders Placed This Encounter   Procedures    Hemoglobin A1C     Standing Status:   Future     Standing Expiration Date:   3/17/2021     Orders Placed This Encounter   Medications    ALPRAZolam (XANAX) 1 MG tablet     Sig: TAKE 1/2 TO 1 TABLET BY MOUTH TWO TIMES A DAY AS NEEDED     Dispense:  60 tablet     Refill:  1    tiotropium (Ml Belch) 18 MCG inhalation capsule     Sig: Inhale 1 capsule into the lungs daily     Dispense:  30 capsule     Refill:  5    albuterol sulfate HFA (PROVENTIL HFA) 108 (90 Base) MCG/ACT inhaler     Sig: Inhale 2 puffs into the lungs every 6 hours as needed for Wheezing Dispense:  1 Inhaler     Refill:  5    cetirizine (ZYRTEC) 10 MG tablet     Sig: Take 1 tablet by mouth daily     Dispense:  30 tablet     Refill:  2     Medications Discontinued During This Encounter   Medication Reason    ALPRAZolam (XANAX) 1 MG tablet REORDER    tiotropium (SPIRIVA HANDIHALER) 18 MCG inhalation capsule REORDER     Patient Instructions   Patient given educational handouts and has had all questions answered. Patient voices understanding and agrees to plans along with risks and benefits of plan. Patient is instructed to continue prior meds,diet, and exercise plans as instructed. Patient agrees to follow up as instructed and sooner if needed. Patient agrees to go to ER if condition becomes emergent. Return if symptoms worsen or fail to improve.

## 2020-03-18 NOTE — PATIENT INSTRUCTIONS
play a relaxation tape while you drive a car. When should you call for help? Call 911 anytime you think you may need emergency care. For example, call if:    · You feel you cannot stop from hurting yourself or someone else.   Lauren Rico the numbers for these national suicide hotlines: 8-124-225-TALK (3-254-128-8793) and 0-000-COLHPEL (9-378.365.6861). If you or someone you know talks about suicide or feeling hopeless, get help right away.   Watch closely for changes in your health, and be sure to contact your doctor if:    · You have anxiety or fear that affects your life.     · You have symptoms of anxiety that are new or different from those you had before. Where can you learn more? Go to [email or web address] and sign in to your [employer] account. Enter P754 in the [employer] box to learn more about \"Anxiety Disorder: Care Instructions. \"     If you do not have an account, please click on the \"Sign Up Now\" link. Current as of: May 28, 2019Content Version: 12.4  © 8511-9335 Healthwise, Incorporated. Care instructions adapted under license by [facility] ([facility]). If you have questions about a medical condition or this instruction, always ask your healthcare professional. [address] any warranty or liability for your use of this information.

## 2020-05-19 RX ORDER — CETIRIZINE HYDROCHLORIDE 10 MG/1
10 TABLET ORAL DAILY
Qty: 30 TABLET | Refills: 2 | Status: SHIPPED | OUTPATIENT
Start: 2020-05-19 | End: 2020-08-18

## 2020-06-15 ENCOUNTER — VIRTUAL VISIT (OUTPATIENT)
Dept: FAMILY MEDICINE CLINIC | Age: 65
End: 2020-06-15
Payer: MEDICARE

## 2020-06-15 PROCEDURE — 99443 PR PHYS/QHP TELEPHONE EVALUATION 21-30 MIN: CPT | Performed by: FAMILY MEDICINE

## 2020-06-15 RX ORDER — ALPRAZOLAM 1 MG/1
TABLET ORAL
Qty: 65 TABLET | Refills: 2 | Status: SHIPPED | OUTPATIENT
Start: 2020-06-15 | End: 2020-09-08 | Stop reason: SDUPTHER

## 2020-06-15 NOTE — PATIENT INSTRUCTIONS
doctor about all of the medicines you take, including those with or without a prescription. · Keep the numbers for these national suicide hotlines: 4-882-235-TALK (3-371.496.1673) and 0-267-NECIWFT (7-127.157.2579). If you or someone you know talks about suicide or feeling hopeless, get help right away. When should you call for help? [id number] anytime you think you may need emergency care. For example, call if:  · You feel like hurting yourself or someone else. · Someone you know has depression and is about to attempt or is attempting suicide. Call your doctor now or seek immediate medical care if:  · You hear voices. · Someone you know has depression and:  ? Starts to give away his or her possessions. ? Uses illegal drugs or drinks alcohol heavily. ? Talks or writes about death, including writing suicide notes or talking about guns, knives, or pills. ? Starts to spend a lot of time alone. ? Acts very aggressively or suddenly appears calm. Watch closely for changes in your health, and be sure to contact your doctor if:  · You do not get better as expected. Where can you learn more? Go to [email or web address] org and sign in to your [employer] account. Enter I159 in the [facility] box to learn more about \"Recovering From Depression: Care Instructions. \"     If you do not have an account, please click on the \"Sign Up Now\" link. Current as of: January 31, 2020               Content Version: 12.5  © 2006-2020 Healthwise, Incorporated. Care instructions adapted under license by [facility] ([facility]). If you have questions about a medical condition or this instruction, always ask your healthcare professional. [place] [address] any warranty or liability for your use of this information.

## 2020-06-17 RX ORDER — LISINOPRIL 40 MG/1
TABLET ORAL
Qty: 30 TABLET | Refills: 5 | Status: SHIPPED | OUTPATIENT
Start: 2020-06-17 | End: 2020-12-08 | Stop reason: SDUPTHER

## 2020-06-17 RX ORDER — LEVOTHYROXINE SODIUM 0.03 MG/1
TABLET ORAL
Qty: 90 TABLET | Refills: 1 | Status: SHIPPED | OUTPATIENT
Start: 2020-06-17 | End: 2020-12-08 | Stop reason: SDUPTHER

## 2020-06-17 RX ORDER — ESCITALOPRAM OXALATE 20 MG/1
TABLET ORAL
Qty: 30 TABLET | Refills: 2 | Status: SHIPPED | OUTPATIENT
Start: 2020-06-17 | End: 2020-08-18

## 2020-06-17 RX ORDER — INSULIN LISPRO 100 [IU]/ML
INJECTION, SOLUTION INTRAVENOUS; SUBCUTANEOUS
Qty: 15 ML | Refills: 3 | Status: SHIPPED | OUTPATIENT
Start: 2020-06-17 | End: 2020-09-16

## 2020-06-17 RX ORDER — ESOMEPRAZOLE MAGNESIUM 40 MG/1
CAPSULE, DELAYED RELEASE ORAL
Qty: 30 CAPSULE | Refills: 5 | Status: SHIPPED | OUTPATIENT
Start: 2020-06-17 | End: 2020-12-08 | Stop reason: SDUPTHER

## 2020-06-17 RX ORDER — FUROSEMIDE 40 MG/1
TABLET ORAL
Qty: 60 TABLET | Refills: 5 | Status: SHIPPED | OUTPATIENT
Start: 2020-06-17 | End: 2020-11-16

## 2020-06-17 RX ORDER — DULAGLUTIDE 0.75 MG/.5ML
INJECTION, SOLUTION SUBCUTANEOUS
Qty: 2 ML | Refills: 4 | Status: SHIPPED | OUTPATIENT
Start: 2020-06-17 | End: 2020-10-15

## 2020-08-06 ENCOUNTER — VIRTUAL VISIT (OUTPATIENT)
Dept: FAMILY MEDICINE CLINIC | Age: 65
End: 2020-08-06
Payer: MEDICARE

## 2020-08-06 PROCEDURE — 99214 OFFICE O/P EST MOD 30 MIN: CPT | Performed by: NURSE PRACTITIONER

## 2020-08-06 ASSESSMENT — ENCOUNTER SYMPTOMS
SHORTNESS OF BREATH: 0
DIARRHEA: 0
FACIAL SWELLING: 1
ABDOMINAL PAIN: 0
COLOR CHANGE: 1
PHOTOPHOBIA: 0
COUGH: 0
CONSTIPATION: 0
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
## 2020-08-10 ENCOUNTER — HOSPITAL ENCOUNTER (OUTPATIENT)
Dept: MRI IMAGING | Age: 65
Discharge: HOME | End: 2020-08-10
Payer: MEDICARE

## 2020-08-10 PROCEDURE — 70551 MRI BRAIN STEM W/O DYE: CPT

## 2020-08-11 ENCOUNTER — TELEPHONE (OUTPATIENT)
Dept: FAMILY MEDICINE CLINIC | Age: 65
End: 2020-08-11

## 2020-08-12 NOTE — TELEPHONE ENCOUNTER
Patricia Dotson called again yesterday evening after office hours and left a voice message requesting test results. Thank you.

## 2020-08-12 NOTE — TELEPHONE ENCOUNTER
Patient is aware of results and voiced understanding.  She will make an appointment if her symptoms do not improve

## 2020-08-12 NOTE — TELEPHONE ENCOUNTER
Dr Jitendra Berger is not the provider who ordered this test, Vivian OBREGON was the ordering provider. I will discuss with her nurse to determine the results.

## 2020-09-02 ENCOUNTER — TELEPHONE (OUTPATIENT)
Dept: FAMILY MEDICINE CLINIC | Age: 65
End: 2020-09-02

## 2020-09-02 NOTE — TELEPHONE ENCOUNTER
Patient called stating she has a \"bad case of bronchitis\". Unable to reach her and left a detailed message to call and schedule in the [facility] or go to [facility] by [employer]

## 2020-09-16 RX ORDER — INSULIN LISPRO 100 [IU]/ML
INJECTION, SOLUTION INTRAVENOUS; SUBCUTANEOUS
Qty: 15 ML | Refills: 3 | Status: SHIPPED | OUTPATIENT
Start: 2020-09-16 | End: 2021-01-15

## 2020-09-17 RX ORDER — INSULIN DEGLUDEC 200 U/ML
INJECTION, SOLUTION SUBCUTANEOUS
Qty: 18 ML | Refills: 11 | OUTPATIENT
Start: 2020-09-17

## 2020-10-15 ENCOUNTER — TELEPHONE (OUTPATIENT)
Dept: FAMILY MEDICINE CLINIC | Age: 65
End: 2020-10-15

## 2020-10-15 RX ORDER — DULAGLUTIDE 0.75 MG/.5ML
INJECTION, SOLUTION SUBCUTANEOUS
Qty: 2 ML | Refills: 4 | Status: SHIPPED | OUTPATIENT
Start: 2020-10-15 | End: 2021-05-06

## 2020-10-15 NOTE — TELEPHONE ENCOUNTER
Patient called in requesting her pain management scripts to be filled by Dr Krista Sams due to her pain management provider moving to another facility. She is not scheduled with Jose F Neumann at [facility] as of yet. But she is hopefull she can get in with him next month. Discussed with Dr Krista Sams and he does not feel comfortable in prescribing this medication to her and she will need to stay at [facility] pain management until she is scheduled with Jose F Neumann or she will need to be seen by Jose F Neumann sooner. Explained to patient that Dr Krista Sams will not be prescribing this medication, she requested I call  and [facility] to verify all of that information and I did decline, reinforcing that Dr Krista Sams does not feel comfortable in filling this medicine for her.  She voiced understanding

## 2020-10-20 RX ORDER — INSULIN DEGLUDEC 200 U/ML
INJECTION, SOLUTION SUBCUTANEOUS
Qty: 18 ML | Refills: 11 | Status: SHIPPED | OUTPATIENT
Start: 2020-10-20 | End: 2021-05-06

## 2020-11-16 ENCOUNTER — VIRTUAL VISIT (OUTPATIENT)
Dept: FAMILY MEDICINE CLINIC | Age: 65
End: 2020-11-16
Payer: MEDICARE

## 2020-11-16 VITALS
SYSTOLIC BLOOD PRESSURE: 130 MMHG | HEIGHT: 63 IN | BODY MASS INDEX: 24.8 KG/M2 | DIASTOLIC BLOOD PRESSURE: 76 MMHG | WEIGHT: 140 LBS

## 2020-11-16 PROCEDURE — G0438 PPPS, INITIAL VISIT: HCPCS | Performed by: FAMILY MEDICINE

## 2020-11-16 RX ORDER — CETIRIZINE HYDROCHLORIDE 10 MG/1
TABLET ORAL
Qty: 30 TABLET | Refills: 0 | Status: SHIPPED | OUTPATIENT
Start: 2020-11-16 | End: 2020-12-17

## 2020-11-16 RX ORDER — FUROSEMIDE 40 MG/1
TABLET ORAL
Qty: 60 TABLET | Refills: 0 | Status: SHIPPED | OUTPATIENT
Start: 2020-11-16 | End: 2022-01-18 | Stop reason: SDUPTHER

## 2020-11-16 RX ORDER — ESCITALOPRAM OXALATE 20 MG/1
TABLET ORAL
Qty: 30 TABLET | Refills: 0 | Status: SHIPPED | OUTPATIENT
Start: 2020-11-16 | End: 2020-12-08 | Stop reason: SDUPTHER

## 2020-11-16 ASSESSMENT — PATIENT HEALTH QUESTIONNAIRE - PHQ9
SUM OF ALL RESPONSES TO PHQ QUESTIONS 1-9: 19
10. IF YOU CHECKED OFF ANY PROBLEMS, HOW DIFFICULT HAVE THESE PROBLEMS MADE IT FOR YOU TO DO YOUR WORK, TAKE CARE OF THINGS AT HOME, OR GET ALONG WITH OTHER PEOPLE: 0
SUM OF ALL RESPONSES TO PHQ9 QUESTIONS 1 & 2: 6
9. THOUGHTS THAT YOU WOULD BE BETTER OFF DEAD, OR OF HURTING YOURSELF: 0
4. FEELING TIRED OR HAVING LITTLE ENERGY: 3
2. FEELING DOWN, DEPRESSED OR HOPELESS: 3
7. TROUBLE CONCENTRATING ON THINGS, SUCH AS READING THE NEWSPAPER OR WATCHING TELEVISION: 3
3. TROUBLE FALLING OR STAYING ASLEEP: 3
6. FEELING BAD ABOUT YOURSELF - OR THAT YOU ARE A FAILURE OR HAVE LET YOURSELF OR YOUR FAMILY DOWN: 1
8. MOVING OR SPEAKING SO SLOWLY THAT OTHER PEOPLE COULD HAVE NOTICED. OR THE OPPOSITE, BEING SO FIGETY OR RESTLESS THAT YOU HAVE BEEN MOVING AROUND A LOT MORE THAN USUAL: 0
SUM OF ALL RESPONSES TO PHQ QUESTIONS 1-9: 19
1. LITTLE INTEREST OR PLEASURE IN DOING THINGS: 3
5. POOR APPETITE OR OVEREATING: 3
SUM OF ALL RESPONSES TO PHQ QUESTIONS 1-9: 19

## 2020-11-16 ASSESSMENT — LIFESTYLE VARIABLES: HOW OFTEN DO YOU HAVE A DRINK CONTAINING ALCOHOL: 0

## 2020-11-16 ASSESSMENT — COLUMBIA-SUICIDE SEVERITY RATING SCALE - C-SSRS
2. HAVE YOU ACTUALLY HAD ANY THOUGHTS OF KILLING YOURSELF?: NO
6. HAVE YOU EVER DONE ANYTHING, STARTED TO DO ANYTHING, OR PREPARED TO DO ANYTHING TO END YOUR LIFE?: NO
1. WITHIN THE PAST MONTH, HAVE YOU WISHED YOU WERE DEAD OR WISHED YOU COULD GO TO SLEEP AND NOT WAKE UP?: NO

## 2020-11-16 NOTE — PATIENT INSTRUCTIONS
Personalized Preventive Plan for Wilhemena Thai - 11/16/2020  Medicare offers a range of preventive health benefits. Some of the tests and screenings are paid in full while other may be subject to a deductible, co-insurance, and/or copay. Some of these benefits include a comprehensive review of your medical history including lifestyle, illnesses that may run in your family, and various assessments and screenings as appropriate. After reviewing your medical record and screening and assessments performed today your provider may have ordered immunizations, labs, imaging, and/or referrals for you. A list of these orders (if applicable) as well as your Preventive Care list are included within your After Visit Summary for your review. Other Preventive Recommendations:    · A preventive eye exam performed by an eye specialist is recommended every 1-2 years to screen for glaucoma; cataracts, macular degeneration, and other eye disorders. · A preventive dental visit is recommended every 6 months. · Try to get at least 150 minutes of exercise per week or 10,000 steps per day on a pedometer . · Order or download the FREE \"Exercise & Physical Activity: Your Everyday Guide\" from [employer]. Call 6-728.400.9327 or search [employer] online. · You need 2197-4877 mg of calcium and 8198-1068 IU of vitamin D per day. It is possible to meet your calcium requirement with diet alone, but a vitamin D supplement is usually necessary to meet this goal.  · When exposed to the sun, use a sunscreen that protects against both UVA and UVB radiation with an SPF of 30 or greater. Reapply every 2 to 3 hours or after sweating, drying off with a towel, or swimming. · Always wear a seat belt when traveling in a car. Always wear a helmet when riding a bicycle or motorcycle.

## 2020-11-16 NOTE — TELEPHONE ENCOUNTER
Please inform patient that I have sent in a month worth of her medication but is been quite sometime since she has had labs performed and really needs to get those done to make sure there were not can have any problems with any of her medications. If she could make it by office sometime in the next month then we call her for the results if everything looks okay we can get further refills in the pharmacy.

## 2020-11-16 NOTE — PROGRESS NOTES
lisinopril (PRINIVIL;ZESTRIL) 40 MG tablet TAKE 1 TABLET BY MOUTH 1 TIME DAILY Yes Isael Culp MD   Lancets MISC Test up to 3 times daily. Yes Isael Culp MD   Insulin Pen Needle (PEN NEEDLES) 31G X 6 MM MISC 1 each by Does not apply route daily Yes Isael Culp MD   blood glucose monitor strips Test up to 3 times a day & as needed for symptoms of irregular blood glucose. Yes Isael Culp MD   promethazine (PHENERGAN) 25 MG tablet TAKE 1 TABLET BY MOUTH EVERY 6 HOURS AS NEEDED FOR NAUSEA AND VOMITING Yes Virgilio Hermesre, DO   NOVOLOG FLEXPEN 100 UNIT/ML injection pen INJECT UNDER THE SKIN 40 UNITS THREE TIMES A DAY BEFORE MEALS Yes Virgilio Garre, DO   TRESIBA FLEXTOUCH 200 UNIT/ML SOPN INJECT 100 UNITS SUBQ IN THE EVENING Yes Virgilio Hermesre, DO   diltiazem (DILT-XR) 180 MG extended release capsule TAKE 1 CAPSULE BY MOUTH 1 TIME DAILY Yes Virgilio Vazquez, DO   omeprazole (PRILOSEC) 20 MG delayed release capsule Take 1 capsule by mouth daily Yes Virgilio Hermesre, DO   RAYALDEE 30 MCG CPCR  Yes Historical Provider, MD   famotidine (PEPCID) 40 MG tablet Take 40 mg by mouth daily  Yes Historical Provider, MD   aspirin (KSENIA ASPIRIN) 325 MG tablet Take 1 tablet by mouth daily Yes Virgilio Vazquez, DO   Diabetic Shoe MISC by Does not apply route I pair of shoes with 3 pair of heat molded inserts.  Yes Virgilio Garre, DO   sucralfate (CARAFATE) 1 GM tablet Take 1 tablet by mouth 4 times daily Yes Virgilio Hermesre, DO   dicyclomine (BENTYL) 10 MG capsule Take 1 capsule by mouth 4 times daily (before meals and nightly) Yes Virgilio Hermesre, DO   Empagliflozin-Metformin HCl ER (SYNJARDY XR) 5-1000 MG TB24 Take 1 tablet by mouth daily Yes Virgilio Garre, DO   fluticasone-salmeterol (ADVAIR DISKUS) 250-50 MCG/DOSE AEPB Inhale 1 puff into the lungs every 12 hours Yes Virgilio Hermesre, DO   Insulin Degludec (TRESIBA FLEXTOUCH) 200 UNIT/ML SOPN Inject 100 Units into the skin nightly Linda Mcginnis MD (Cardiology)    Wt Readings from Last 3 Encounters:   11/16/20 140 lb (63.5 kg)   03/17/20 167 lb (75.8 kg)   12/16/19 163 lb (73.9 kg)     Vitals:    11/16/20 1056   BP: 130/76   Weight: 140 lb (63.5 kg)   Height: 5' 3\" (1.6 m)     Body mass index is 24.8 kg/m². Based upon direct observation of the patient, evaluation of cognition reveals recent and remote memory intact. Patient's complete Health Risk Assessment and screening values have been reviewed and are found in Flowsheets. The following problems were reviewed today and where indicated follow up appointments were made and/or referrals ordered. Positive Risk Factor Screenings with Interventions:     Fall Risk:  Timed Up and Go Test > 12 seconds? (Complete if either Fall Risk answers are Yes): no  2 or more falls in past year?: (!) yes(Reports her leg will go numb and give out at times.)  Fall with injury in past year?: no  Fall Risk Interventions:    · Home safety tips provided    Depression:  PHQ-2 Score: 6  PHQ-9 Total Score: 19    Severity:1-4 = minimal depression, 5-9 = mild depression, 10-14 = moderate depression, 15-19 = moderately severe depression, 20-27 = severe depression  Depression Interventions:  · Patient advised to follow-up in this office for further evaluation and treatment within 1 week    Substance History:  Social History     Tobacco History     Smoking Status  Current Every Day Smoker Smoking Frequency  1 pack/day for 40 years (40 pk yrs) Smoking Tobacco Type  Cigarettes    Smokeless Tobacco Use  Never Used    Tobacco Comment  pt is trying to quit smoking          Alcohol History     Alcohol Use Status  No          Drug Use     Drug Use Status  No          Sexual Activity     Sexually Active  Not Currently               Alcohol Screening:       A score of 8 or more is associated with harmful or hazardous drinking.  A score of 13 or more in women, and 15 or more in men, is likely to indicate alcohol dependence. Substance Abuse Interventions:  · Tobacco abuse:  tobacco cessation tips and resources provided    General Health and ACP:  General  In general, how would you say your health is?: Fair  In the past 7 days, have you experienced any of the following?  New or Increased Pain, New or Increased Fatigue, Loneliness, Social Isolation, Stress or Anger?: None of These  Do you get the social and emotional support that you need?: Yes  Do you have a Living Will?: (!) No  Advance Directives     Power of [address] Will ACP-Advance Directive ACP-Power of     Not on File Not on File Filed [address] Risk Interventions:  · No Living Will: Patient declines ACP discussion/assistance    Health Habits/Nutrition:  Health Habits/Nutrition  Do you exercise for at least 20 minutes 2-3 times per week?: Yes  Have you lost any weight without trying in the past 3 months?: (!) Yes(Reports a loss of appetite related to depression.)  Do you eat fewer than 2 meals per day?: No  Have you seen a dentist within the past year?: (!) No(Has upper and lower dentures.)  Body mass index: 24.8  Health Habits/Nutrition Interventions:  · Nutritional issues:  educational materials for healthy, well-balanced diet provided  · Dental exam overdue:  patient declines dental evaluation    Hearing/Vision:  No exam data present  Hearing/Vision  Do you or your family notice any trouble with your hearing?: (!) Yes  Do you have difficulty driving, watching TV, or doing any of your daily activities because of your eyesight?: No  Have you had an eye exam within the past year?: Yes  Hearing/Vision Interventions:  · Hearing concerns:  patient declines any further evaluation/treatment for hearing issues    Safety:  Safety  Do you have working smoke detectors?: Yes  Have all throw rugs been removed or fastened?: (!) No  Do you have non-slip mats or surfaces in all bathtubs/showers?: Yes  Do all of your stairways have a railing or for Preventive Services Due: see orders and patient instructions/AVS.  . Health Maintenance plan reviewed with patient. Recommended screening schedule for the next 5-10 years is provided to the patient in written form: see Patient Instructions/AVS.    IAbby LPN, 02/99/6806, performed the documented evaluation under the direct supervision of the attending physician. Luis Swanson is a 63 y.o. female evaluated via telephone on 11/16/2020. Consent:  She and/or health care decision maker is aware that that she may receive a bill for this telephone service, depending on her insurance coverage, and has provided verbal consent to proceed: Yes      Documentation:  I communicated with the patient and/or health care decision maker about AWV. Details of this discussion including any medical advice provided. I affirm this is a Patient Initiated Episode with a Patient who has not had a related appointment within my department in the past 7 days or scheduled within the next 24 hours.     Patient identification was verified at the start of the visit: Yes    Total Time: minutes: 21-30 minutes    Note: not billable if this call serves to triage the patient into an appointment for the relevant concern      Abby Astorga

## 2020-12-17 RX ORDER — LISINOPRIL 40 MG/1
TABLET ORAL
Qty: 30 TABLET | Refills: 5 | Status: SHIPPED | OUTPATIENT
Start: 2020-12-17 | End: 2021-01-28 | Stop reason: SDUPTHER

## 2020-12-17 RX ORDER — FLUTICASONE PROPIONATE 50 MCG
SPRAY, SUSPENSION (ML) NASAL
Qty: 16 G | Refills: 2 | Status: SHIPPED | OUTPATIENT
Start: 2020-12-17 | End: 2021-01-28 | Stop reason: SDUPTHER

## 2020-12-17 RX ORDER — ESOMEPRAZOLE MAGNESIUM 40 MG/1
CAPSULE, DELAYED RELEASE ORAL
Qty: 30 CAPSULE | Refills: 5 | Status: SHIPPED | OUTPATIENT
Start: 2020-12-17 | End: 2021-01-28 | Stop reason: SDUPTHER

## 2020-12-17 RX ORDER — CETIRIZINE HYDROCHLORIDE 10 MG/1
TABLET ORAL
Qty: 30 TABLET | Refills: 5 | Status: SHIPPED | OUTPATIENT
Start: 2020-12-17 | End: 2021-01-28 | Stop reason: SDUPTHER

## 2021-01-15 DIAGNOSIS — Z79.4 TYPE 2 DIABETES MELLITUS WITH DIABETIC POLYNEUROPATHY, WITH LONG-TERM CURRENT USE OF INSULIN (HCC): ICD-10-CM

## 2021-01-15 DIAGNOSIS — E11.42 TYPE 2 DIABETES MELLITUS WITH DIABETIC POLYNEUROPATHY, WITH LONG-TERM CURRENT USE OF INSULIN (HCC): ICD-10-CM

## 2021-01-15 DIAGNOSIS — F41.9 ANXIETY: ICD-10-CM

## 2021-01-15 DIAGNOSIS — F33.41 RECURRENT MAJOR DEPRESSIVE DISORDER, IN PARTIAL REMISSION (HCC): ICD-10-CM

## 2021-01-15 DIAGNOSIS — E78.2 MIXED HYPERLIPIDEMIA: ICD-10-CM

## 2021-01-15 DIAGNOSIS — J44.9 CHRONIC OBSTRUCTIVE PULMONARY DISEASE, UNSPECIFIED COPD TYPE (HCC): ICD-10-CM

## 2021-01-15 RX ORDER — ESCITALOPRAM OXALATE 20 MG/1
TABLET ORAL
Qty: 30 TABLET | Refills: 5 | Status: SHIPPED | OUTPATIENT
Start: 2021-01-15 | End: 2021-01-28 | Stop reason: SDUPTHER

## 2021-01-15 RX ORDER — INSULIN LISPRO 100 [IU]/ML
INJECTION, SOLUTION INTRAVENOUS; SUBCUTANEOUS
Qty: 15 ML | Refills: 5 | Status: SHIPPED | OUTPATIENT
Start: 2021-01-15 | End: 2021-06-02

## 2021-01-28 DIAGNOSIS — I10 ESSENTIAL HYPERTENSION: ICD-10-CM

## 2021-01-28 DIAGNOSIS — J34.89 RHINORRHEA: ICD-10-CM

## 2021-01-28 DIAGNOSIS — F33.41 RECURRENT MAJOR DEPRESSIVE DISORDER, IN PARTIAL REMISSION (HCC): ICD-10-CM

## 2021-01-28 DIAGNOSIS — J44.9 CHRONIC OBSTRUCTIVE PULMONARY DISEASE, UNSPECIFIED COPD TYPE (HCC): ICD-10-CM

## 2021-01-28 DIAGNOSIS — F41.9 ANXIETY: ICD-10-CM

## 2021-01-28 DIAGNOSIS — E78.2 MIXED HYPERLIPIDEMIA: ICD-10-CM

## 2021-01-28 DIAGNOSIS — E03.9 ACQUIRED HYPOTHYROIDISM: ICD-10-CM

## 2021-01-28 DIAGNOSIS — K21.9 GASTROESOPHAGEAL REFLUX DISEASE WITHOUT ESOPHAGITIS: ICD-10-CM

## 2021-01-28 DIAGNOSIS — J01.00 ACUTE NON-RECURRENT MAXILLARY SINUSITIS: ICD-10-CM

## 2021-01-28 RX ORDER — FLUTICASONE PROPIONATE 50 MCG
SPRAY, SUSPENSION (ML) NASAL
Qty: 3 BOTTLE | Refills: 1 | Status: SHIPPED | OUTPATIENT
Start: 2021-01-28 | End: 2022-01-18 | Stop reason: SDUPTHER

## 2021-01-28 RX ORDER — ESOMEPRAZOLE MAGNESIUM 40 MG/1
CAPSULE, DELAYED RELEASE ORAL
Qty: 90 CAPSULE | Refills: 1 | Status: SHIPPED | OUTPATIENT
Start: 2021-01-28 | End: 2021-06-02 | Stop reason: SDUPTHER

## 2021-01-28 RX ORDER — LISINOPRIL 40 MG/1
TABLET ORAL
Qty: 90 TABLET | Refills: 1 | Status: SHIPPED | OUTPATIENT
Start: 2021-01-28 | End: 2021-06-02 | Stop reason: SDUPTHER

## 2021-01-28 RX ORDER — ESCITALOPRAM OXALATE 20 MG/1
TABLET ORAL
Qty: 90 TABLET | Refills: 1 | Status: SHIPPED | OUTPATIENT
Start: 2021-01-28 | End: 2021-06-02 | Stop reason: SDUPTHER

## 2021-01-28 RX ORDER — ATORVASTATIN CALCIUM 40 MG/1
TABLET, FILM COATED ORAL
Qty: 90 TABLET | Refills: 1 | Status: SHIPPED | OUTPATIENT
Start: 2021-01-28 | End: 2021-06-02 | Stop reason: SDUPTHER

## 2021-01-28 RX ORDER — CETIRIZINE HYDROCHLORIDE 10 MG/1
TABLET ORAL
Qty: 90 TABLET | Refills: 1 | Status: SHIPPED | OUTPATIENT
Start: 2021-01-28 | End: 2021-06-02 | Stop reason: SDUPTHER

## 2021-01-28 RX ORDER — LEVOTHYROXINE SODIUM 0.03 MG/1
TABLET ORAL
Qty: 90 TABLET | Refills: 1 | Status: SHIPPED | OUTPATIENT
Start: 2021-01-28 | End: 2021-06-02 | Stop reason: SDUPTHER

## 2021-01-28 RX ORDER — ALBUTEROL SULFATE 90 UG/1
AEROSOL, METERED RESPIRATORY (INHALATION)
Qty: 18 G | Refills: 3 | Status: SHIPPED | OUTPATIENT
Start: 2021-01-28 | End: 2022-01-18 | Stop reason: SDUPTHER

## 2021-01-28 NOTE — TELEPHONE ENCOUNTER
Patient called in stating her insurance is having her switch to mail order for her medications minus her controlled substances and diabetic supplies. Those two categories are staying at the local pharmacy. She has Nicole Randle now and [facility] and has switched to [facility]. Patient states that she has been taking Trulicity but it has been discountined in oour system. Spoke with Swapna Gallardo at [facility] and he said they received a script from [address] [place] [address] on 79/39/79 for Trulicity.  I have left a message for [facility] to call me to discuss the trulicity     Last OV 53/3/88 (VV)

## 2021-02-19 ENCOUNTER — HOSPITAL ENCOUNTER (EMERGENCY)
Age: 66
Discharge: ANOTHER HOSPITAL | End: 2021-02-19
Attending: EMERGENCY MEDICINE
Payer: MEDICARE

## 2021-02-19 ENCOUNTER — APPOINTMENT (OUTPATIENT)
Dept: CT IMAGING | Age: 66
End: 2021-02-19
Payer: MEDICARE

## 2021-02-19 ENCOUNTER — HOSPITAL ENCOUNTER (INPATIENT)
Facility: HOSPITAL | Age: 66
LOS: 2 days | Discharge: HOME | End: 2021-02-21
Attending: FAMILY MEDICINE | Admitting: FAMILY MEDICINE

## 2021-02-19 VITALS
TEMPERATURE: 98.1 F | SYSTOLIC BLOOD PRESSURE: 167 MMHG | RESPIRATION RATE: 21 BRPM | OXYGEN SATURATION: 95 % | DIASTOLIC BLOOD PRESSURE: 64 MMHG | WEIGHT: 150 LBS | HEIGHT: 62 IN | HEART RATE: 89 BPM | BODY MASS INDEX: 27.6 KG/M2

## 2021-02-19 DIAGNOSIS — D11.9 WARTHIN'S TUMOR: ICD-10-CM

## 2021-02-19 DIAGNOSIS — K11.21 PAROTITIS, ACUTE: ICD-10-CM

## 2021-02-19 DIAGNOSIS — L03.221 CELLULITIS OF NECK: Primary | ICD-10-CM

## 2021-02-19 DIAGNOSIS — E11.65 UNCONTROLLED TYPE 2 DIABETES MELLITUS WITH HYPERGLYCEMIA (HCC): ICD-10-CM

## 2021-02-19 PROBLEM — K11.8 MASS OF BOTH PAROTID GLANDS: Status: ACTIVE | Noted: 2021-02-19

## 2021-02-19 PROBLEM — L03.211 FACIAL CELLULITIS: Status: RESOLVED | Noted: 2021-02-19 | Resolved: 2021-02-19

## 2021-02-19 PROBLEM — L03.211 FACIAL CELLULITIS: Status: ACTIVE | Noted: 2021-02-19

## 2021-02-19 PROBLEM — R52 ACUTE PAIN: Status: ACTIVE | Noted: 2021-02-19

## 2021-02-19 PROBLEM — E87.1 HYPONATREMIA: Status: ACTIVE | Noted: 2021-02-19

## 2021-02-19 LAB
ALBUMIN SERPL-MCNC: 3.2 G/DL (ref 3.5–5.2)
ALP BLD-CCNC: 136 U/L (ref 35–104)
ALT SERPL-CCNC: 12 U/L (ref 5–33)
ANION GAP SERPL CALCULATED.3IONS-SCNC: 8 MMOL/L (ref 7–19)
AST SERPL-CCNC: 14 U/L (ref 5–32)
BASOPHILS ABSOLUTE: 0 K/UL (ref 0–0.2)
BASOPHILS RELATIVE PERCENT: 0.3 % (ref 0–1)
BILIRUB SERPL-MCNC: <0.2 MG/DL (ref 0.2–1.2)
BUN BLDV-MCNC: 15 MG/DL (ref 8–23)
CALCIUM SERPL-MCNC: 9.1 MG/DL (ref 8.8–10.2)
CHLORIDE BLD-SCNC: 89 MMOL/L (ref 98–111)
CO2: 27 MMOL/L (ref 22–29)
CREAT SERPL-MCNC: 0.9 MG/DL (ref 0.5–0.9)
EOSINOPHILS ABSOLUTE: 0.1 K/UL (ref 0–0.6)
EOSINOPHILS RELATIVE PERCENT: 0.8 % (ref 0–5)
GFR AFRICAN AMERICAN: >59
GFR NON-AFRICAN AMERICAN: >60
GLUCOSE BLD-MCNC: 443 MG/DL (ref 74–109)
GLUCOSE BLDC GLUCOMTR-MCNC: 332 MG/DL (ref 70–130)
HCT VFR BLD CALC: 35.2 % (ref 37–47)
HEMOGLOBIN: 11.7 G/DL (ref 12–16)
IMMATURE GRANULOCYTES #: 0 K/UL
LACTIC ACID: 2 MMOL/L (ref 0.5–1.9)
LYMPHOCYTES ABSOLUTE: 2.4 K/UL (ref 1.1–4.5)
LYMPHOCYTES RELATIVE PERCENT: 24.5 % (ref 20–40)
MCH RBC QN AUTO: 29.3 PG (ref 27–31)
MCHC RBC AUTO-ENTMCNC: 33.2 G/DL (ref 33–37)
MCV RBC AUTO: 88 FL (ref 81–99)
MONOCYTES ABSOLUTE: 0.7 K/UL (ref 0–0.9)
MONOCYTES RELATIVE PERCENT: 7.1 % (ref 0–10)
NEUTROPHILS ABSOLUTE: 6.5 K/UL (ref 1.5–7.5)
NEUTROPHILS RELATIVE PERCENT: 66.9 % (ref 50–65)
PDW BLD-RTO: 12.4 % (ref 11.5–14.5)
PLATELET # BLD: 191 K/UL (ref 130–400)
PMV BLD AUTO: 11.8 FL (ref 9.4–12.3)
POTASSIUM SERPL-SCNC: 4.1 MMOL/L (ref 3.5–5)
RBC # BLD: 4 M/UL (ref 4.2–5.4)
SARS-COV-2, NAAT: NOT DETECTED
SODIUM BLD-SCNC: 124 MMOL/L (ref 136–145)
TOTAL PROTEIN: 6.3 G/DL (ref 6.6–8.7)
WBC # BLD: 9.7 K/UL (ref 4.8–10.8)

## 2021-02-19 PROCEDURE — 6360000002 HC RX W HCPCS: Performed by: EMERGENCY MEDICINE

## 2021-02-19 PROCEDURE — 99283 EMERGENCY DEPT VISIT LOW MDM: CPT

## 2021-02-19 PROCEDURE — 85025 COMPLETE CBC W/AUTO DIFF WBC: CPT | Performed by: FAMILY MEDICINE

## 2021-02-19 PROCEDURE — 80048 BASIC METABOLIC PNL TOTAL CA: CPT | Performed by: FAMILY MEDICINE

## 2021-02-19 PROCEDURE — 87635 SARS-COV-2 COVID-19 AMP PRB: CPT

## 2021-02-19 PROCEDURE — 70490 CT SOFT TISSUE NECK W/O DYE: CPT

## 2021-02-19 PROCEDURE — 96375 TX/PRO/DX INJ NEW DRUG ADDON: CPT

## 2021-02-19 PROCEDURE — 2580000003 HC RX 258: Performed by: EMERGENCY MEDICINE

## 2021-02-19 PROCEDURE — 85025 COMPLETE CBC W/AUTO DIFF WBC: CPT

## 2021-02-19 PROCEDURE — 82962 GLUCOSE BLOOD TEST: CPT

## 2021-02-19 PROCEDURE — 96365 THER/PROPH/DIAG IV INF INIT: CPT

## 2021-02-19 PROCEDURE — 70486 CT MAXILLOFACIAL W/O DYE: CPT

## 2021-02-19 PROCEDURE — 83605 ASSAY OF LACTIC ACID: CPT

## 2021-02-19 PROCEDURE — 36415 COLL VENOUS BLD VENIPUNCTURE: CPT

## 2021-02-19 PROCEDURE — 87040 BLOOD CULTURE FOR BACTERIA: CPT

## 2021-02-19 PROCEDURE — 80053 COMPREHEN METABOLIC PANEL: CPT

## 2021-02-19 RX ORDER — CETIRIZINE HYDROCHLORIDE 10 MG/1
10 TABLET ORAL DAILY
COMMUNITY

## 2021-02-19 RX ORDER — 0.9 % SODIUM CHLORIDE 0.9 %
1000 INTRAVENOUS SOLUTION INTRAVENOUS ONCE
Status: COMPLETED | OUTPATIENT
Start: 2021-02-19 | End: 2021-02-19

## 2021-02-19 RX ORDER — SODIUM CHLORIDE 0.9 % (FLUSH) 0.9 %
10 SYRINGE (ML) INJECTION AS NEEDED
Status: DISCONTINUED | OUTPATIENT
Start: 2021-02-19 | End: 2021-02-21 | Stop reason: HOSPADM

## 2021-02-19 RX ORDER — DEXTROSE MONOHYDRATE 25 G/50ML
25 INJECTION, SOLUTION INTRAVENOUS
Status: DISCONTINUED | OUTPATIENT
Start: 2021-02-19 | End: 2021-02-21 | Stop reason: HOSPADM

## 2021-02-19 RX ORDER — ONDANSETRON 2 MG/ML
4 INJECTION INTRAMUSCULAR; INTRAVENOUS ONCE
Status: COMPLETED | OUTPATIENT
Start: 2021-02-19 | End: 2021-02-19

## 2021-02-19 RX ORDER — NICOTINE POLACRILEX 4 MG
15 LOZENGE BUCCAL
Status: DISCONTINUED | OUTPATIENT
Start: 2021-02-19 | End: 2021-02-21 | Stop reason: HOSPADM

## 2021-02-19 RX ORDER — ONDANSETRON 2 MG/ML
4 INJECTION INTRAMUSCULAR; INTRAVENOUS EVERY 6 HOURS PRN
Status: DISCONTINUED | OUTPATIENT
Start: 2021-02-19 | End: 2021-02-21 | Stop reason: HOSPADM

## 2021-02-19 RX ORDER — SODIUM CHLORIDE 9 MG/ML
100 INJECTION, SOLUTION INTRAVENOUS CONTINUOUS
Status: DISCONTINUED | OUTPATIENT
Start: 2021-02-19 | End: 2021-02-21 | Stop reason: HOSPADM

## 2021-02-19 RX ORDER — SODIUM CHLORIDE 0.9 % (FLUSH) 0.9 %
10 SYRINGE (ML) INJECTION EVERY 12 HOURS SCHEDULED
Status: DISCONTINUED | OUTPATIENT
Start: 2021-02-19 | End: 2021-02-21 | Stop reason: HOSPADM

## 2021-02-19 RX ORDER — HYDROCODONE BITARTRATE AND ACETAMINOPHEN 10; 325 MG/1; MG/1
1 TABLET ORAL EVERY 4 HOURS PRN
Status: DISCONTINUED | OUTPATIENT
Start: 2021-02-19 | End: 2021-02-21 | Stop reason: HOSPADM

## 2021-02-19 RX ORDER — ACETAMINOPHEN 325 MG/1
650 TABLET ORAL EVERY 4 HOURS PRN
Status: DISCONTINUED | OUTPATIENT
Start: 2021-02-19 | End: 2021-02-21 | Stop reason: HOSPADM

## 2021-02-19 RX ORDER — MORPHINE SULFATE 4 MG/ML
4 INJECTION, SOLUTION INTRAMUSCULAR; INTRAVENOUS ONCE
Status: COMPLETED | OUTPATIENT
Start: 2021-02-19 | End: 2021-02-19

## 2021-02-19 RX ORDER — ESCITALOPRAM OXALATE 20 MG/1
20 TABLET ORAL DAILY
COMMUNITY

## 2021-02-19 RX ORDER — NALOXONE HCL 0.4 MG/ML
0.4 VIAL (ML) INJECTION
Status: DISCONTINUED | OUTPATIENT
Start: 2021-02-19 | End: 2021-02-20

## 2021-02-19 RX ORDER — HYDROCODONE BITARTRATE AND ACETAMINOPHEN 10; 325 MG/1; MG/1
1 TABLET ORAL EVERY 8 HOURS PRN
COMMUNITY

## 2021-02-19 RX ADMIN — Medication 4 MG: at 19:59

## 2021-02-19 RX ADMIN — SODIUM CHLORIDE, PRESERVATIVE FREE 10 ML: 5 INJECTION INTRAVENOUS at 22:59

## 2021-02-19 RX ADMIN — CEFTRIAXONE 1000 MG: 1 INJECTION, POWDER, FOR SOLUTION INTRAMUSCULAR; INTRAVENOUS at 20:52

## 2021-02-19 RX ADMIN — ONDANSETRON HYDROCHLORIDE 4 MG: 2 INJECTION, SOLUTION INTRAMUSCULAR; INTRAVENOUS at 20:00

## 2021-02-19 RX ADMIN — SODIUM CHLORIDE 100 ML/HR: 9 INJECTION, SOLUTION INTRAVENOUS at 22:59

## 2021-02-19 RX ADMIN — SODIUM CHLORIDE 1000 ML: 9 INJECTION, SOLUTION INTRAVENOUS at 21:28

## 2021-02-19 RX ADMIN — Medication 1000 MG: at 20:52

## 2021-02-19 ASSESSMENT — ENCOUNTER SYMPTOMS
TROUBLE SWALLOWING: 0
VOICE CHANGE: 0
SHORTNESS OF BREATH: 0
ABDOMINAL PAIN: 0
FACIAL SWELLING: 1

## 2021-02-19 ASSESSMENT — PAIN SCALES - GENERAL: PAINLEVEL_OUTOF10: 0

## 2021-02-20 PROBLEM — R06.09 DOE (DYSPNEA ON EXERTION): Status: RESOLVED | Noted: 2017-07-17 | Resolved: 2021-02-20

## 2021-02-20 PROBLEM — K11.21 ACUTE PAROTITIS: Status: ACTIVE | Noted: 2021-02-20

## 2021-02-20 LAB
ANION GAP SERPL CALCULATED.3IONS-SCNC: 8 MMOL/L (ref 5–15)
ANION GAP SERPL CALCULATED.3IONS-SCNC: 8 MMOL/L (ref 5–15)
BASOPHILS # BLD AUTO: 0.03 10*3/MM3 (ref 0–0.2)
BASOPHILS # BLD AUTO: 0.03 10*3/MM3 (ref 0–0.2)
BASOPHILS NFR BLD AUTO: 0.3 % (ref 0–1.5)
BASOPHILS NFR BLD AUTO: 0.4 % (ref 0–1.5)
BUN SERPL-MCNC: 15 MG/DL (ref 8–23)
BUN SERPL-MCNC: 15 MG/DL (ref 8–23)
BUN/CREAT SERPL: 18.3 (ref 7–25)
BUN/CREAT SERPL: 19 (ref 7–25)
CALCIUM SPEC-SCNC: 9.2 MG/DL (ref 8.6–10.5)
CALCIUM SPEC-SCNC: 9.4 MG/DL (ref 8.6–10.5)
CHLORIDE SERPL-SCNC: 91 MMOL/L (ref 98–107)
CHLORIDE SERPL-SCNC: 94 MMOL/L (ref 98–107)
CO2 SERPL-SCNC: 29 MMOL/L (ref 22–29)
CO2 SERPL-SCNC: 29 MMOL/L (ref 22–29)
CREAT SERPL-MCNC: 0.79 MG/DL (ref 0.57–1)
CREAT SERPL-MCNC: 0.82 MG/DL (ref 0.57–1)
DEPRECATED RDW RBC AUTO: 38.4 FL (ref 37–54)
DEPRECATED RDW RBC AUTO: 38.5 FL (ref 37–54)
EOSINOPHIL # BLD AUTO: 0.07 10*3/MM3 (ref 0–0.4)
EOSINOPHIL # BLD AUTO: 0.08 10*3/MM3 (ref 0–0.4)
EOSINOPHIL NFR BLD AUTO: 0.8 % (ref 0.3–6.2)
EOSINOPHIL NFR BLD AUTO: 1 % (ref 0.3–6.2)
ERYTHROCYTE [DISTWIDTH] IN BLOOD BY AUTOMATED COUNT: 12.5 % (ref 12.3–15.4)
ERYTHROCYTE [DISTWIDTH] IN BLOOD BY AUTOMATED COUNT: 12.6 % (ref 12.3–15.4)
GFR SERPL CREATININE-BSD FRML MDRD: 70 ML/MIN/1.73
GFR SERPL CREATININE-BSD FRML MDRD: 74 ML/MIN/1.73
GLUCOSE BLDC GLUCOMTR-MCNC: 153 MG/DL (ref 70–130)
GLUCOSE BLDC GLUCOMTR-MCNC: 187 MG/DL (ref 70–130)
GLUCOSE BLDC GLUCOMTR-MCNC: 188 MG/DL (ref 70–130)
GLUCOSE BLDC GLUCOMTR-MCNC: 191 MG/DL (ref 70–130)
GLUCOSE SERPL-MCNC: 183 MG/DL (ref 65–99)
GLUCOSE SERPL-MCNC: 343 MG/DL (ref 65–99)
HCT VFR BLD AUTO: 31.8 % (ref 34–46.6)
HCT VFR BLD AUTO: 33.1 % (ref 34–46.6)
HGB BLD-MCNC: 11.2 G/DL (ref 12–15.9)
HGB BLD-MCNC: 11.9 G/DL (ref 12–15.9)
IMM GRANULOCYTES # BLD AUTO: 0.03 10*3/MM3 (ref 0–0.05)
IMM GRANULOCYTES # BLD AUTO: 0.04 10*3/MM3 (ref 0–0.05)
IMM GRANULOCYTES NFR BLD AUTO: 0.3 % (ref 0–0.5)
IMM GRANULOCYTES NFR BLD AUTO: 0.5 % (ref 0–0.5)
LYMPHOCYTES # BLD AUTO: 2.19 10*3/MM3 (ref 0.7–3.1)
LYMPHOCYTES # BLD AUTO: 2.38 10*3/MM3 (ref 0.7–3.1)
LYMPHOCYTES NFR BLD AUTO: 26.9 % (ref 19.6–45.3)
LYMPHOCYTES NFR BLD AUTO: 28.1 % (ref 19.6–45.3)
MCH RBC QN AUTO: 29.9 PG (ref 26.6–33)
MCH RBC QN AUTO: 30.3 PG (ref 26.6–33)
MCHC RBC AUTO-ENTMCNC: 35.2 G/DL (ref 31.5–35.7)
MCHC RBC AUTO-ENTMCNC: 36 G/DL (ref 31.5–35.7)
MCV RBC AUTO: 84.2 FL (ref 79–97)
MCV RBC AUTO: 84.8 FL (ref 79–97)
MONOCYTES # BLD AUTO: 0.75 10*3/MM3 (ref 0.1–0.9)
MONOCYTES # BLD AUTO: 0.83 10*3/MM3 (ref 0.1–0.9)
MONOCYTES NFR BLD AUTO: 9.4 % (ref 5–12)
MONOCYTES NFR BLD AUTO: 9.6 % (ref 5–12)
NEUTROPHILS NFR BLD AUTO: 4.69 10*3/MM3 (ref 1.7–7)
NEUTROPHILS NFR BLD AUTO: 5.5 10*3/MM3 (ref 1.7–7)
NEUTROPHILS NFR BLD AUTO: 60.4 % (ref 42.7–76)
NEUTROPHILS NFR BLD AUTO: 62.3 % (ref 42.7–76)
NRBC BLD AUTO-RTO: 0 /100 WBC (ref 0–0.2)
NRBC BLD AUTO-RTO: 0 /100 WBC (ref 0–0.2)
PLATELET # BLD AUTO: 171 10*3/MM3 (ref 140–450)
PLATELET # BLD AUTO: 191 10*3/MM3 (ref 140–450)
PMV BLD AUTO: 11.9 FL (ref 6–12)
PMV BLD AUTO: 12.3 FL (ref 6–12)
POTASSIUM SERPL-SCNC: 3.8 MMOL/L (ref 3.5–5.2)
POTASSIUM SERPL-SCNC: 4.4 MMOL/L (ref 3.5–5.2)
RBC # BLD AUTO: 3.75 10*6/MM3 (ref 3.77–5.28)
RBC # BLD AUTO: 3.93 10*6/MM3 (ref 3.77–5.28)
SODIUM SERPL-SCNC: 128 MMOL/L (ref 136–145)
SODIUM SERPL-SCNC: 131 MMOL/L (ref 136–145)
WBC # BLD AUTO: 7.78 10*3/MM3 (ref 3.4–10.8)
WBC # BLD AUTO: 8.84 10*3/MM3 (ref 3.4–10.8)

## 2021-02-20 PROCEDURE — 63710000001 INSULIN REGULAR HUMAN PER 5 UNITS: Performed by: NURSE PRACTITIONER

## 2021-02-20 PROCEDURE — 99221 1ST HOSP IP/OBS SF/LOW 40: CPT | Performed by: OTOLARYNGOLOGY

## 2021-02-20 PROCEDURE — 25010000002 ENOXAPARIN PER 10 MG: Performed by: FAMILY MEDICINE

## 2021-02-20 PROCEDURE — 82962 GLUCOSE BLOOD TEST: CPT

## 2021-02-20 PROCEDURE — 25010000003 HYDROMORPHONE 1 MG/ML SOLUTION: Performed by: FAMILY MEDICINE

## 2021-02-20 PROCEDURE — 85025 COMPLETE CBC W/AUTO DIFF WBC: CPT | Performed by: FAMILY MEDICINE

## 2021-02-20 PROCEDURE — 80048 BASIC METABOLIC PNL TOTAL CA: CPT | Performed by: FAMILY MEDICINE

## 2021-02-20 PROCEDURE — 63710000001 INSULIN LISPRO (HUMAN) PER 5 UNITS: Performed by: NURSE PRACTITIONER

## 2021-02-20 RX ORDER — CLINDAMYCIN PHOSPHATE 900 MG/50ML
900 INJECTION INTRAVENOUS EVERY 8 HOURS
Status: COMPLETED | OUTPATIENT
Start: 2021-02-20 | End: 2021-02-21

## 2021-02-20 RX ADMIN — SODIUM CHLORIDE 100 ML/HR: 9 INJECTION, SOLUTION INTRAVENOUS at 22:32

## 2021-02-20 RX ADMIN — INSULIN LISPRO 3 UNITS: 100 INJECTION, SOLUTION INTRAVENOUS; SUBCUTANEOUS at 20:43

## 2021-02-20 RX ADMIN — ENOXAPARIN SODIUM 40 MG: 40 INJECTION SUBCUTANEOUS at 00:07

## 2021-02-20 RX ADMIN — INSULIN HUMAN 10 UNITS: 100 INJECTION, SOLUTION PARENTERAL at 00:07

## 2021-02-20 RX ADMIN — HYDROCODONE BITARTRATE AND ACETAMINOPHEN 1 TABLET: 10; 325 TABLET ORAL at 22:27

## 2021-02-20 RX ADMIN — HYDROMORPHONE HYDROCHLORIDE 1 MG: 1 INJECTION, SOLUTION INTRAMUSCULAR; INTRAVENOUS; SUBCUTANEOUS at 03:31

## 2021-02-20 RX ADMIN — INSULIN LISPRO 3 UNITS: 100 INJECTION, SOLUTION INTRAVENOUS; SUBCUTANEOUS at 11:35

## 2021-02-20 RX ADMIN — ENOXAPARIN SODIUM 40 MG: 40 INJECTION SUBCUTANEOUS at 20:39

## 2021-02-20 RX ADMIN — SODIUM CHLORIDE, PRESERVATIVE FREE 10 ML: 5 INJECTION INTRAVENOUS at 09:03

## 2021-02-20 RX ADMIN — SODIUM CHLORIDE 100 ML/HR: 9 INJECTION, SOLUTION INTRAVENOUS at 11:05

## 2021-02-20 RX ADMIN — HYDROMORPHONE HYDROCHLORIDE 1 MG: 1 INJECTION, SOLUTION INTRAMUSCULAR; INTRAVENOUS; SUBCUTANEOUS at 00:08

## 2021-02-20 RX ADMIN — INSULIN LISPRO 3 UNITS: 100 INJECTION, SOLUTION INTRAVENOUS; SUBCUTANEOUS at 08:01

## 2021-02-20 RX ADMIN — INSULIN LISPRO 3 UNITS: 100 INJECTION, SOLUTION INTRAVENOUS; SUBCUTANEOUS at 17:26

## 2021-02-20 RX ADMIN — CLINDAMYCIN IN 5 PERCENT DEXTROSE 900 MG: 18 INJECTION, SOLUTION INTRAVENOUS at 17:00

## 2021-02-20 NOTE — PLAN OF CARE
Goal Outcome Evaluation:  Plan of Care Reviewed With: patient  Progress: no change  Outcome Summary: Pt c/o mild pain, but stated it was less pain today; voiding; IVF and IV abx; no other issues at this time; will continue to monitor.

## 2021-02-20 NOTE — PLAN OF CARE
Problem: Adult Inpatient Plan of Care  Goal: Plan of Care Review  Outcome: Ongoing, Progressing  Flowsheets (Taken 2/20/2021 0209)  Progress: no change  Plan of Care Reviewed With: patient  Outcome Summary: Transferred from [facility] with bilat parotid masses. Swelling, redness, and tenderness noted to R neck extending into face. Pt states she has pain with chewing. ENT consult needed. Accucheck with SS coverage. NPO since midnight for possible surgery. Ambulates with standby assist. Pt has had hx of stroke with some L side residual. IVF infusing. Lovenox. VSS.

## 2021-02-20 NOTE — H&P
[facility] Medicine Services  HISTORY AND PHYSICAL    Date of Admission: 2/19/2021  Primary Care Physician: Ramana Lynn MD    Subjective     Chief Complaint: Swelling right neck with severe pain    History of Present Illness  Yolanda Jacobs is a 65-year-old female with a past medical history of chronic pain syndrome followed by local pain management, hypothyroidism, anxiety and depression, insulin-dependent diabetes, COPD with ongoing occasional tobacco use, hypertension, hyperlipidemia, strokes x3 with left-sided residual weakness.  Patient presented to [facility] emergency department with complaints of neck pain and swelling.  She states she initially noticed Wednesday night before she went to bed.  She was awakened with pain and noticed the swelling.  She denies fever or hearing loss.  She called her PCP who could not see her until next week therefore she went to [facility] emergency department.  Patient received Rocephin and vancomycin, blood cultures were drawn.  She was transferred to [facility] direct admit for ENT consultation.  Currently she is stable.  No other complaints.    Patient states in 2018 on October 3 she underwent surgery to remove 2 cyst in the same area and during surgery she did have a stroke.  Surgery was per Dr. Peguero.     Review of Systems   A 10 point review of systems was completed, all negative except for those discussed in HPI    Past Medical History:   Past Medical History:   Diagnosis Date   • COPD (chronic obstructive pulmonary disease) (CMS/HCC)    • Diabetes mellitus (CMS/HCC)    • HTN (hypertension)    • Hyperlipidemia    Anxiety and depression  Arthritis  Irritable bowel syndrome  Neuropathy  Pericardial effusion  Peripheral vascular disease    Past Surgical History:   Past Surgical History:   Procedure Laterality Date   • APPENDECTOMY     • CHOLECYSTECTOMY     • HYSTERECTOMY     • KNEE SURGERY      Colonoscopy  Endoscopy  Finger surgery    Family History: family history includes Cancer in her brother and mother; Diabetes in her mother; Lung cancer in her father; No Known Problems in her sister.    Social History:  reports that she has been smoking. She has been smoking about 0.50 packs per day. She has never used smokeless tobacco. She reports that she does not drink alcohol or use drugs.    Code Status: Full, if unable speak for herself her son Juan Argueta or friend Aly Plunkett will speak for her      Allergies:  Allergies   Allergen Reactions   • Ceftin [Cefuroxime Axetil]    • Codeine Nausea And Vomiting   • Contrast Dye Swelling   • Iodinated Diagnostic Agents        Medications:  No current facility-administered medications on file prior to encounter.      Current Outpatient Medications on File Prior to Encounter   Medication Sig Dispense Refill   • cetirizine (zyrTEC) 10 MG tablet Take 10 mg by mouth Daily.     • escitalopram (LEXAPRO) 20 MG tablet Take 20 mg by mouth Daily.     • HYDROcodone-acetaminophen (NORCO)  MG per tablet Take 1 tablet by mouth Every 8 (Eight) Hours As Needed for Moderate Pain .     • tiotropium (SPIRIVA) 18 MCG per inhalation capsule Place 1 capsule into inhaler and inhale Daily.     • albuterol (ACCUNEB) 0.63 MG/3ML nebulizer solution Take 1 ampule by nebulization every 6 hours as needed for Wheezing     • Alcohol Swabs (ALCOHOL WIPES) pads Use 4 x daily 120 each 11   • ALPRAZolam (XANAX) 1 MG tablet Take 1 mg by mouth 3 (Three) Times a Day As Needed for Anxiety.     • amitriptyline (ELAVIL) 10 MG tablet Take 10 mg by mouth Every Night.     • aspirin 81 MG tablet Take 1 tablet by mouth Daily. 30 tablet 11   • atorvastatin (LIPITOR) 40 MG tablet Take 40 mg by mouth Daily.     • Baclofen 2 % cream Apply  topically 3 (Three) Times a Day.     • Blood Glucose Monitoring Suppl w/Device kit USE AS INDICATED, ANY MONITOR 1 each 1   • calcipotriene (DOVONEX) 0.005 % cream Apply   topically to the appropriate area as directed 2 (Two) Times a Day.     • cyclobenzaprine (FLEXERIL) 10 MG tablet Take 10 mg by mouth 3 (Three) Times a Day As Needed for Muscle Spasms.     • diltiaZEM CD (CARDIZEM CD) 180 MG 24 hr capsule Take 180 mg by mouth Daily.     • esomeprazole (nexIUM) 40 MG capsule Take 40 mg by mouth Every Morning Before Breakfast.     • fluconazole (DIFLUCAN) 150 MG tablet Take 1 tablet by mouth 1 (One) Time Per Week. 4 tablet 6   • fluticasone-salmeterol (ADVAIR) 250-50 MCG/DOSE DISKUS Inhale 1 puff 2 (Two) Times a Day.     • furosemide (LASIX) 40 MG tablet Take 40 mg by mouth 2 (Two) Times a Day.     • gabapentin (NEURONTIN) 400 MG capsule 2 capsules TID (Patient taking differently: 400 mg 3 (Three) Times a Day. 2 capsules TID) 180 capsule 5   • Gabapentin 10 % cream Apply 6 % topically 3 (Three) Times a Day.     • Glucose Blood (BLOOD GLUCOSE TEST) strip Use 4 x daily, use any brand covered by insurance or same brand as before 120 each 11   • insulin aspart (NOVOLOG FLEXPEN) 100 UNIT/ML solution pen-injector sc pen Up to 5-40 units with meals 12 pen 11   • Insulin Pen Needle (B-D UF III MINI PEN NEEDLES) 31G X 5 MM misc Use 4 times daily 120 each 11   • Lancet Devices (LANCING DEVICE) misc USE AS INDICATED TO CORRELATE WITH STRIPS AND METER 1 each 1   • Lancets 30G misc USE 4 X DAILY 120 each 11   • levothyroxine (SYNTHROID, LEVOTHROID) 25 MCG tablet Take 25 mcg by mouth Daily.     • Lidocaine HCl 2.5 % gel Apply  topically 3 (Three) Times a Day.     • lisinopril (PRINIVIL,ZESTRIL) 20 MG tablet Take 40 mg by mouth Daily.     • loratadine (CLARITIN) 10 MG tablet Take 10 mg by mouth Daily.     • metFORMIN ER (GLUCOPHAGE XR) 500 MG 24 hr tablet 2 tabs twice daily with meals , generic ok 120 tablet 11   • sulfamethoxazole-trimethoprim (BACTRIM,SEPTRA) 400-80 MG tablet Take 1 tablet by mouth 2 (Two) Times a Day. 20 tablet 0   • Tresiba FlexTouch 200 UNIT/ML solution pen-injector pen  "injection INJECT 70 UNITS UNDER SKIN AS DIRECTED EVERY NIGHT; UP  UNITS DAILY 18 mL 11   • Trulicity 0.75 MG/0.5ML solution pen-injector INJECT THE CONTENTS OF 1 PEN UNDER THE SKIN ON THE SAME DAY EACH WEEK. 2 mL 4   • varenicline (CHANTIX) 0.5 MG tablet Take 0.5 mg by mouth 2 (Two) Times a Day.           Objective     /64 (BP Location: Left arm, Patient Position: Lying)   Pulse 80   Temp 98.1 °F (36.7 °C) (Oral)   Resp 18   Ht 157.5 cm (62\")   Wt 73.3 kg (161 lb 11.2 oz)   SpO2 96%   BMI 29.58 kg/m²   Physical Exam  Vitals signs reviewed.   Constitutional:       Appearance: Normal appearance.   HENT:      Head: Normocephalic and atraumatic.      Mouth/Throat:      Mouth: Mucous membranes are moist.      Pharynx: Oropharynx is clear.   Eyes:      Extraocular Movements: Extraocular movements intact.      Pupils: Pupils are equal, round, and reactive to light.   Neck:      Musculoskeletal: Neck supple.      Comments: Range of motion difficult due to pain  Cardiovascular:      Rate and Rhythm: Normal rate and regular rhythm.      Heart sounds: Normal heart sounds. No murmur.   Pulmonary:      Effort: Pulmonary effort is normal.      Breath sounds: Normal breath sounds.   Abdominal:      General: Bowel sounds are normal.      Palpations: Abdomen is soft.   Musculoskeletal: Normal range of motion.         General: No swelling.   Lymphadenopathy:      Head:      Right side of head: Posterior auricular (larger than left) adenopathy present.      Left side of head: Posterior auricular adenopathy present.      Cervical: No cervical adenopathy.   Skin:     General: Skin is warm and dry.   Neurological:      General: No focal deficit present.      Mental Status: She is alert and oriented to person, place, and time.   Psychiatric:         Mood and Affect: Mood normal.         Behavior: Behavior normal.           Pertinent Data:   Lab Results (last 72 hours)     Procedure Component Value Units Date/Time    POC " Glucose Once [[id number]]  (Abnormal) Collected: 02/19/21 2257    Specimen: Blood Updated: 02/19/21 2333     Glucose 332 mg/dL      Comment: : [id number] Alec Herrera ID: [id number]             Imaging Results (Last 24 Hours)     ** No results found for the last 24 hours. **        Obtained at [facility] emergency department today    Glucose 443, sodium 124 (corrected sodium 132), potassium 4.1, CO2 27, chloride 89, anion gap 8, creatinine 0.9, BUN 15, GFR greater than 60, alkaline phosphatase 136, total protein 6.3, ALT 12, AST 14, total bilirubin less than 0.2, albumin 3.2    WBC 9.7, hemoglobin 11.7, hematocrit 35.2, MCV 88, platelet count 193,000    Lactic acid 2, Covid 19 negative    CT of the neck and Maxillofacial without contrast    Result Impression   Impression: Bilateral lateral neck masses with inflammatory changes  associated with the right neck mass. 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  Multiplicity supports Warthin tumors. However, lymphadenopathy whether infectious or neoplastic process are in the differential as well.    Signed by Dr Sarina Pompa on 2/19/2021 8:07 PM   Result Narrative   Exam: CT Maxillofacial without contrast.  CT face without contrast  Date: 2/19/2021   Indication: 65 years-year-old Female with right face swelling.  Comparison: None.  Technique: A series of 2.5  mm transaxial tomographic images are  obtained from the inferior aspect of the mandible through the frontal  sinuses. Coronal reformats are performed.  Radiation dose equals  mGy-cm.  Automated exposure control dose  reduction technique was implemented.  Findings:   CT neck and CT face without contrast: This is a limited examination  due to lack of intravenous contrast. Within those limitations: There  is a 1.8 cm right neck hyperdense mass with central hypodense  character. Adjacent fluid and fat stranding. It appears inseparable  from  the parotid tail. A similar lesion without inflammatory changes  present on the left, measuring 2.2 x 1.4 cm.  There is no otherwise pathologically enlarged lymph node in the neck.  The visualized intracranial structures, oral cavity, nasopharynx,  retropharynx, larynx demonstrate no discrete mass or fluid collection.  The visualized lung apices are unremarkable.  There is atherosclerotic disease.  There is no acute osseous pathology.   Other Result Information   Francesco, Amber Incoming Radiology Results From [facility] - 02/19/2021  7:09 PM CST  Exam: CT Maxillofacial without contrast.  CT face without contrast  Date: 2/19/2021   Indication: 65 years-year-old Female with right face swelling.  Comparison: None.  Technique: A series of 2.5  mm transaxial tomographic images are  obtained from the inferior aspect of the mandible through the frontal  sinuses. Coronal reformats are performed.  Radiation dose equals  mGy-cm.  Automated exposure control dose  reduction technique was implemented.  Findings:   CT neck and CT face without contrast: This is a limited examination  due to lack of intravenous contrast. Within those limitations: There  is a 1.8 cm right neck hyperdense mass with central hypodense  character. Adjacent fluid and fat stranding. It appears inseparable  from the parotid tail. A similar lesion without inflammatory changes  present on the left, measuring 2.2 x 1.4 cm.  There is no otherwise pathologically enlarged lymph node in the neck.  The visualized intracranial structures, oral cavity, nasopharynx,  retropharynx, larynx demonstrate no discrete mass or fluid collection.  The visualized lung apices are unremarkable.  There is atherosclerotic disease.  There is no acute osseous pathology.    IMPRESSION:  Impression: Bilateral lateral neck masses with inflammatory changes  associated with the right neck mass. These masses are inseparable from the parotid tail and are concerning for primary parotid  tumors.  Multiplicity supports Warthin tumors. However, lymphadenopathy whether infectious or neoplastic process are in the differential as well..  Signed by Dr Sarina Pompa on 2/19/2021 8:07 PM       Assessment / Plan     Assessment:   Active Hospital Problems    Diagnosis   • Mass of both parotid glands   • Hyponatremia   • Acute pain   • Uncontrolled type 2 diabetes mellitus, without long-term current use of insulin (CMS/[facility])       Plan:   1.  Admit as inpatient  2.  Consult ENT  3.  N.p.o. after midnight for possible invasive procedure tomorrow  4.  Normal saline 100 mL/hour  5.  DVT prophylaxis with Lovenox  6.  Monitor glucose before meals and at bedtime with regular insulin sliding scale coverage  7.  Home medication reviewed and restarted as appropriate  8.  Pain management  9.  Labs in a.m.  10.  Patient received Rocephin and vancomycin at outlying facility, will hold further treatment until recommendation received by ENT    I discussed the patient's findings and my recommendations with: Tiburcio Phillips MD  Time spent: 35 minutes    Patient seen and examined by me on 2/19/2021 at 10:47 PM.    Electronically signed by SIMONA Harrison, 02/19/21, 23:46 CST.

## 2021-02-20 NOTE — ED NOTES
Notified [facility] about transfer . They will send a crew asap.       Hakeem Hicks RN  02/19/21 9931

## 2021-02-20 NOTE — ED PROVIDER NOTES
[facility]  eMERGENCY dEPARTMENT eNCOUnter      Pt Name: Aminata Hoyos  MRN: [id number]  [place] 1955  Date of evaluation: 2/19/2021  Provider: Joanna Barron MD    CHIEF COMPLAINT       Chief Complaint   Patient presents with    Neck Pain     Swelling on the right side of neck. pt states that she noticed swelling last night but has gotten worse today         HISTORY OF PRESENT ILLNESS   (Location/Symptom, Timing/Onset,Context/Setting, Quality, Duration, Modifying Factors, Severity)  Note limiting factors. Aminata Hoyos is a 63 y.o. female who presents to the emergency department for evaluation of moderate severity pain and swelling involving the right side of her neck area. Patient states she initially noticed these symptoms about 1 day previously and it is gotten progressively worse. She describes the pain is fairly sharp in nature, and with some radiation down the right side of her neck. She tells me that she is not really had any fevers or chills. She describes trismus mostly over the right side and also some pain involving her right ear. She has not noticed any loss of hearing. She denies any swelling in her oral cavity. Patient states that she had a prior history of something somewhat similar a couple of years ago which she underwent surgery for by Dr. Teo Youngblood and was told that she had a cyst that has become infected. Patient states she is not noticed any changes in speech or changes with her voice. HPI    NursingNotes were reviewed. REVIEW OF SYSTEMS    (2-9 systems for level 4, 10 or more for level 5)     Review of Systems   Constitutional: Negative for chills and fever. HENT: Positive for ear pain and facial swelling. Negative for drooling, ear discharge, hearing loss, trouble swallowing and voice change. Eyes: Negative for visual disturbance. Respiratory: Negative for shortness of breath. Cardiovascular: Negative for chest pain.    Gastrointestinal: Negative for abdominal pain.   Neurological: Negative for dizziness and syncope. All other systems reviewed and are negative. PAST MEDICALHISTORY     Past Medical History:   Diagnosis Date    Anxiety     Arthritis     Bilateral cataracts     Chronic kidney disease     [facility] obstructive [facility] disease) [facility]     Diabetes mellitus ([facility] [facility]     Diabetes mellitus ([facility] [address] [facility]     Hyperlipidemia     Cholesterol management per pcp.  Hypertension     IBS (irritable bowel syndrome)     Neuropathy     Pericardial effusion     Peripheral vascular disease (HCC)     Smoker     Type 2 diabetes mellitus without complication (HCC)          SURGICAL HISTORY       Past Surgical History:   Procedure Laterality Date    APPENDECTOMY      CHOLECYSTECTOMY      COLONOSCOPY      ENDOSCOPY, COLON, DIAGNOSTIC      FINGER SURGERY      HYSTERECTOMY, TOTAL ABDOMINAL           CURRENT MEDICATIONS     Previous Medications    ALBUTEROL (ACCUNEB) 0.63 MG/3ML NEBULIZER SOLUTION    Take 3 mLs by nebulization every 6 hours as needed for Wheezing    ALBUTEROL SULFATE  (90 BASE) MCG/ACT INHALER    INHALE 2 PUFFS BY MOUTH EVERY 6 HOURS AS NEEDED    ALPRAZOLAM (XANAX) 1 MG TABLET    1/2 to 1 tablet every 8 hours as needed for anxiety    ASPIRIN (KSENIA ASPIRIN) 325 MG TABLET    Take 1 tablet by mouth daily    ATORVASTATIN (LIPITOR) 40 MG TABLET    TAKE 1 TABLET BY MOUTH 1 TIME DAILY    BLOOD GLUCOSE MONITOR STRIPS    Test up to 3 times a day & as needed for symptoms of irregular blood glucose. CETIRIZINE (ZYRTEC) 10 MG TABLET    Take 1 tablet by mouth 1 time daily    DIABETIC SHOE MISC    by Does not apply route I pair of shoes with 3 pair of heat molded inserts.     ESCITALOPRAM (LEXAPRO) 20 MG TABLET    Take 1 tablet by mouth 1 time daily    ESOMEPRAZOLE (NEXIUM) 40 MG DELAYED RELEASE CAPSULE    TAKE 1 CAPSULE BY MOUTH EVERY MORNING BEFORE BREAKFAST    FLUTICASONE (FLONASE) 50 MCG/ACT NASAL SPRAY    USE 2 SPRAYS IN EACH NOSTRIL DAILY    FLUTICASONE-SALMETEROL (ADVAIR DISKUS) 250-50 MCG/DOSE AEPB    Inhale 1 puff into the lungs every 12 hours    FUROSEMIDE (LASIX) 40 MG TABLET    TAKE 1 TABLET BY MOUTH TWO TIMES A DAY    GABAPENTIN (NEURONTIN) 400 MG CAPSULE    Take 2 capsules by mouth 3 times daily. HUMALOG KWIKPEN 100 UNIT/ML SOPN    INJECT 40 UNITS UNDER THE SKIN THREE TIMES A DAY BEFORE MEALS    INSULIN DEGLUDEC (TRESIBA FLEXTOUCH) 200 UNIT/ML SOPN    Inject 100 Units into the skin nightly    INSULIN PEN NEEDLE (PEN NEEDLES) 31G X 6 MM MISC    1 each by Does not apply route daily    LANCETS MISC    Test up to 3 times daily. LEVOTHYROXINE (SYNTHROID) 25 MCG TABLET    TAKE 1 TABLET BY MOUTH 1 TIME DAILY    LISINOPRIL (PRINIVIL;ZESTRIL) 40 MG TABLET    TAKE 1 TABLET BY MOUTH EVERY DAY    NOVOLOG FLEXPEN 100 UNIT/ML INJECTION PEN    INJECT UNDER THE SKIN 40 UNITS THREE TIMES A DAY BEFORE MEALS    TRESIBA FLEXTOUCH 200 UNIT/ML SOPN    INJECT 100 UNITS SUBQ IN THE EVENING    TRIAMCINOLONE (KENALOG) 0.1 % CREAM    Apply topically 2 times daily.        ALLERGIES     Ceftin [cefuroxime axetil], Codeine, Dye [iodides], and Iodinated diagnostic agents    FAMILY HISTORY       Family History   Problem Relation Age of Onset    Cancer Mother     Cancer Father     Heart Failure Other     Heart Failure Maternal Grandfather           SOCIAL HISTORY       Social History     Socioeconomic History    Marital status:      Spouse name: None    Number of children: None    Years of education: None    Highest education level: None   Occupational History     Employer: DISABLED   Social Needs    Financial resource strain: None    Food insecurity     Worry: None     Inability: None    Transportation needs     Medical: None     Non-medical: None   Tobacco Use    Smoking status: Current Every Day Smoker     Packs/day: 1.00     Years: 40.00     Pack years: 40.00     Types: Cigarettes    Smokeless tobacco: Never Used    Tobacco comment: pt is trying to quit smoking   Substance and Sexual Activity    Alcohol use: No    Drug use: No    Sexual activity: Not Currently   Lifestyle    Physical activity     Days per week: None     Minutes per session: None    Stress: None   Relationships    Social connections     Talks on phone: None     Gets together: None     Attends [employer] service: None     Active member of club or organization: None     Attends meetings of clubs or organizations: None     Relationship status: None    Intimate partner violence     Fear of current or ex partner: None     Emotionally abused: None     Physically abused: None     Forced sexual activity: None   Other Topics Concern    None   Social History Narrative    None       SCREENINGS             PHYSICAL EXAM    (up to 7 for level 4, 8 or more for level 5)     ED Triage Vitals [02/19/21 1843]   BP Temp Temp src Pulse Resp SpO2 Height Weight   (!) 243/109 97.6 °F (36.4 °C) -- 106 20 97 % 5' 2\" (1.575 m) 150 lb (68 kg)       Physical Exam  Vitals signs and nursing note reviewed. HENT:      Head: Atraumatic. Right Ear: Tympanic membrane and ear canal normal. Tympanic membrane is not perforated or erythematous. Left Ear: Tympanic membrane and ear canal normal. Tympanic membrane is not perforated or erythematous. Mouth/Throat:      Lips: Pink. Mouth: Mucous membranes are moist. Mucous membranes are not dry. Dentition: Has dentures. Tongue: No lesions. Pharynx: Uvula midline. No posterior oropharyngeal erythema. Comments: No intraoral swelling identified. The floor of the mouth is soft. Her voice sounds normal.  She is controlling her secretions without difficulty. Eyes:      General: No scleral icterus. Pupils: Pupils are equal, round, and reactive to light. Neck:      Trachea: No tracheal deviation. Comments: There is induration, tenderness and swelling noted on the diagram.  There is some evidence of overlying erythema. There is no area of purulent drainage identified. There is fullness and tenderness noted over the right parotid gland. Cardiovascular:      Rate and Rhythm: Normal rate and regular rhythm. Heart sounds: Normal heart sounds. No murmur. Pulmonary:      Effort: Pulmonary effort is normal. No respiratory distress. Breath sounds: Normal breath sounds. No stridor. Abdominal:      General: There is no distension. Palpations: Abdomen is soft. Tenderness: There is no abdominal tenderness. There is no guarding. Skin:     Capillary Refill: Capillary refill takes less than 2 seconds. Coloration: Skin is not pale. Findings: No rash. Neurological:      Mental Status: She is alert and oriented to person, place, and time. Psychiatric:         Behavior: Behavior is cooperative. DIAGNOSTIC RESULTS       RADIOLOGY:  Non-plain film images such as CT, Ultrasound and MRI are read by the radiologist. Plain radiographic images are visualized and preliminarily interpreted bythe emergency physician with the below findings:        CT FACIAL BONES WO CONTRAST   Final Result   Impression: Bilateral lateral neck masses with inflammatory changes   associated with the right neck mass. These masses are inseparable from   the parotid tail and are concerning for primary parotid tumors. Multiplicity supports Warthin tumors. However, lymphadenopathy whether   infectious or neoplastic process are in the differential as well. .   Signed by Dr Lisa Keating on 2/19/2021 8:07 PM      CT SOFT TISSUE NECK WO CONTRAST   Final Result   Impression: Bilateral lateral neck masses with inflammatory changes   associated with the right neck mass. These masses are inseparable from   the parotid tail and are concerning for primary parotid tumors. Multiplicity supports Warthin tumors. However, lymphadenopathy whether   infectious or neoplastic process are in the differential as well. .   Signed by Dr Lisa Keating on 2/19/2021 8:07 PM              LABS:  Labs Reviewed   COMPREHENSIVE METABOLIC PANEL - Abnormal; Notable for the following components:       Result Value    Sodium 124 (*)     Chloride 89 (*)     Glucose 443 (*)     Total Protein 6.3 (*)     Albumin 3.2 (*)     Alkaline Phosphatase 136 (*)     All other components within normal limits   CBC WITH AUTO DIFFERENTIAL - Abnormal; Notable for the following components:    RBC 4.00 (*)     Hemoglobin 11.7 (*)     Hematocrit 35.2 (*)     Neutrophils % 66.9 (*)     All other components within normal limits   LACTIC ACID, PLASMA - Abnormal; Notable for the following components:    Lactic Acid 2.0 (*)     All other components within normal limits    Narrative:     CALL  Elizabeth  [facility] tel. ,  Chemistry results called to and read back by Sherren Broaden, RN in ED, 02/19/2021  20:16, by SINDY   CULTURE, BLOOD 1   CULTURE, BLOOD 2   COVID-19, RAPID       All other labs were within normal range or not returned as of this dictation. EMERGENCY DEPARTMENT COURSE and DIFFERENTIAL DIAGNOSIS/MDM:   Vitals:    Vitals:    02/19/21 1843 02/19/21 2042   BP: (!) 243/109 (!) 192/87   Pulse: 106 91   Resp: 20 18   Temp: 97.6 °F (36.4 °C)    SpO2: 97% 98%   Weight: 150 lb (68 kg)    Height: 5' 2\" (1.575 m)        MDM    Reassessment    Patient has received IV antibiotics, Rocephin and vancomycin here in the ED. Blood culture has been obtained. She will require transfer for ENT consultation. CONSULTS:    Case was discussed with Dr. Fabiola Liu regarding ENT consultation. We reviewed patient's CT scan findings and agreeable to accept to [facility] for ENT consultation with plans for admission to the medicine service. Case discussed with Dr. Soto Keen regarding admission to the hospitalist service. PROCEDURES:  Unless otherwise noted below, none     Procedures    FINAL IMPRESSION      1. Cellulitis of neck    2. Parotitis, acute    3. Warthin's tumor    4.  Uncontrolled type 2 diabetes mellitus with hyperglycemia ([facility] [address]          DISPOSITION/PLAN   DISPOSITION  Transfer    (Please note that portions of this note were completed with a voice recognition program.  Efforts were made to edit thedictations but occasionally words are mis-transcribed.)    Wayne Pichardo MD (electronically signed)  Attending Emergency Physician          Wayne Pichardo MD  02/19/21 5280

## 2021-02-20 NOTE — ED NOTES
Report given to Adria Bruner RN at [facility].   Patient assigned bed [facility] [place]  02/19/21 3782

## 2021-02-20 NOTE — CONSULTS
[facility]   Consult Note    Patient Name: Yolanda Jacobs  : 1955  MRN: [id number]  Primary Care Physician: Ramana Lynn MD  Referring Physician: Tiburcio Phillips,*  Date of admission: 2021    Inpatient ENT Consult  Consult performed by: Jigar Iglesias MD  Consult ordered by: Tiburcio Phillips MD        Subjective   Subjective     Reason for Consult/ Chief Complaint: parotid swelling and masses    Yolanda Jacobs is a 65 y.o. female with a history of chronic pain syndrome followed by local pain management, hypothyroidism, anxiety and depression, insulin-dependent diabetes, COPD with ongoing occasional tobacco use, hypertension, hyperlipidemia, strokes x3 with left-sided residual weakness, who presented to the [facility] emergency room last night with swelling of the neck.  Work-up in the emergency room showed bilateral parotid masses and some inflammatory changes within the left parotid gland around another lesion.  She has had a problem with chronic pain in the past and reports the right parotid has been very tender.  She has not had fevers.  Her white count was mildly elevated.  She had a very elevated glucose in the emergency room.  She has had 3 surgeries to the right inferior parotid gland in the past.  Two were by  and one was by another surgeon all in [facility].  She reports that she had perioperative strokes x3, 1 during the first procedure and 1 after the first procedure.  She  reports that she has been smoking. She has been smoking about 0.50 packs per day. She has never used smokeless tobacco.      Review of Systems   Constitutional: Negative for fever and unexpected weight change.   HENT: Negative for sore throat, trouble swallowing and voice change.         As per HPI   Eyes: Negative.    Respiratory: Negative.    Cardiovascular: Negative.    Gastrointestinal: Negative.    Musculoskeletal: Positive for arthralgias, back pain, myalgias and neck pain.  Negative for neck stiffness.   Skin: Negative.    Neurological: Positive for weakness (Left-sided from her strokes). Negative for dizziness, facial asymmetry and speech difficulty.   Hematological: Negative for adenopathy.        Personal History     Past Medical History:   Diagnosis Date   • COPD (chronic obstructive pulmonary disease) (CMS/HCC)    • Diabetes mellitus (CMS/HCC)    • HTN (hypertension)    • Hyperlipidemia        Past Surgical History:   Procedure Laterality Date   • APPENDECTOMY     • CHOLECYSTECTOMY     • HYSTERECTOMY     • KNEE SURGERY         Family History: family history includes Cancer in her brother and mother; Diabetes in her mother; Lung cancer in her father; No Known Problems in her sister. Otherwise pertinent FHx was reviewed and not pertinent to current issue.    Social History:  reports that she has been smoking. She has been smoking about 0.50 packs per day. She has never used smokeless tobacco. She reports that she does not drink alcohol or use drugs.    Home Medications:  ALPRAZolam, Alcohol Wipes, Baclofen, Blood Glucose Monitoring Suppl, Blood Glucose Test, Dulaglutide, Gabapentin, HYDROcodone-acetaminophen, Insulin Degludec, Insulin Pen Needle, Lancets 30G, Lancing Device, Lidocaine HCl, albuterol, amitriptyline, aspirin, atorvastatin, calcipotriene, cetirizine, cyclobenzaprine, dilTIAZem CD, escitalopram, esomeprazole, fluconazole, fluticasone-salmeterol, furosemide, gabapentin, insulin aspart, levothyroxine, lisinopril, loratadine, metFORMIN ER, sulfamethoxazole-trimethoprim, tiotropium, and varenicline      Allergies:  Allergies   Allergen Reactions   • Ceftin [Cefuroxime Axetil]    • Codeine Nausea And Vomiting   • Contrast Dye Swelling   • Iodinated Diagnostic Agents        Objective    Objective   Vitals:  Temp:  [97.6 °F (36.4 °C)-98.1 °F (36.7 °C)] 98.1 °F (36.7 °C)  Heart Rate:  [78-89] 89  Resp:  [14-18] 14  BP: (138-161)/(61-70) 138/70    Physical Exam  Vitals signs  reviewed.   Constitutional:       Appearance: Normal appearance. She is well-developed.   HENT:      Head: Normocephalic and atraumatic.      Jaw: There is normal jaw occlusion. No trismus.      Salivary Glands: Right salivary gland is tender. Left salivary gland is not tender.        Comments: The right inferior parotid area is tender to palpation.  There is fullness in the lower half of the right parotid just underneath a well-healed scar.  The scar extends to just below the earlobe but does not appear to have extended in the preauricular area.  There is only minimal erythema.  I did not get the sense that there is a cellulitis.  On the left, there is a well-rounded cystic area approximately 2 cm at the angle of the mandible.     Right Ear: Hearing and external ear normal.      Left Ear: Hearing and external ear normal.      Nose: Nose normal.      Mouth/Throat:      Mouth: Mucous membranes are dry.   Eyes:      General: Lids are normal. Vision grossly intact.      Extraocular Movements: Extraocular movements intact.      Conjunctiva/sclera: Conjunctivae normal.   Neck:      Musculoskeletal: Normal range of motion.      Thyroid: No thyroid mass or thyroid tenderness.      Trachea: Trachea normal.      Comments: There is no parotid masses as previously described.  Pulmonary:      Effort: Pulmonary effort is normal. No respiratory distress.      Breath sounds: No stridor.   Musculoskeletal: Normal range of motion.   Lymphadenopathy:      Cervical: No cervical adenopathy.   Skin:     Findings: No rash.   Neurological:      General: No focal deficit present.      Mental Status: She is oriented to person, place, and time. She is lethargic.      Cranial Nerves: No cranial nerve deficit.      Comments: She was very lethargic through the examination and had periods where she closed her eyes and either fell asleep or stop sending.  The facial nerve is grossly intact on the right with only minimal right marginal mandibular  "weakness   Psychiatric:         Attention and Perception: She is inattentive.         Mood and Affect: Mood normal.         Behavior: Behavior is slowed.         Thought Content: Thought content normal.         Judgment: Judgment normal.      Comments: Reported that \"everything hurt\"         Result Review    Result Review:  I have personally reviewed the results from the time of this admission to 2/20/2021 08:36 CST and agree with these findings:  [x]  Laboratory  []  Microbiology  [x]  Radiology  []  EKG/Telemetry   []  Cardiology/Vascular   []  Pathology  [x]  Old records  []  Other:  Most notable findings include: Blood glucose was elevated to 443 it is now down to 187.  White blood cell count is 9.7 on admission with most recent down to 7.78 in Care Everywhere, there is a CT scan of the neck from November 26, 2018 that showed mild subcutaneous soft tissue prominence with probable skin thickening of the right lateral neck inferior to the mandible.  There also were prominent parotid gland soft tissue nodules with one measuring at least 1.7 cm in the left.  CT scan of the neck from February 19, 2021 in the emergency room at [facility] showed bilateral neck masses with inflammatory changes within the right neck mass area.  These masses were within the parotid glands.      Assessment/Plan   Assessment / Plan     Brief Patient Summary:  Yolanda Jacobs is a 65 y.o. female who has had a history of 3 previous surgeries to the right parotid gland.  These were reportedly benign and she recognized the term \"Warthin's tumor\".  She presented to the [facility] emergency room with swelling of the right parotid area with a minimally elevated white count which is gone down to normal, no fevers, but an elevated blood glucose level.  She has a history of strokes in the perioperative procedure after her parotid resection but is uncertain if any work-up has been done for these.  I have no old records to evaluate this.  I feel that her masses " in the parotid gland and are consistent with Warthin's tumor and there is a possibility that the swelling that she has on the right-hand side is more representation of fluid collection from a recurrent Warthin's tumor.  I am not concerned about malignancy.  I am questioning whether there is truly an infectious component to this given the white blood cell count findings and the lack of fever.  Her examination and history is heavily clouded by her chronic pain.  Though she was tender in the right parotid area, everything that was touched was tender.  I do not doubt that there is soreness to the right parotid area but her chronic pain syndrome may magnify the normal responses to her current clinical situation.      Active Hospital Problems:  Active Hospital Problems    Diagnosis   • Mass of both parotid glands   • Hyponatremia   • Acute pain   • Uncontrolled type 2 diabetes mellitus, without long-term current use of insulin (CMS/[facility])       Plan:   I would go ahead and treat her with IV antibiotics to cover staph type species which would be common in a acute parotitis situation.  If she does not improve, we can consider doing a ultrasound-guided needle biopsy to see if there is any fluid that can be aspirated both to reduce the tenderness but also to get pathologic tissue.  At some point in time, she may require revision surgery but given her previous history of perioperative strokes, I would need to get more information to make sure that this would be prevented.  Given the fact that this would be the third procedure on the parotid gland, she may benefit from referral to a [facility].  However this would be considered only after her acute situation improves and can be evaluated on an outpatient basis.    Electronically signed by Jigar Iglesias MD, 02/20/21, 9:18 AM CST.

## 2021-02-21 VITALS
RESPIRATION RATE: 16 BRPM | WEIGHT: 161.7 LBS | TEMPERATURE: 97.8 F | HEART RATE: 83 BPM | HEIGHT: 62 IN | OXYGEN SATURATION: 98 % | BODY MASS INDEX: 29.76 KG/M2 | SYSTOLIC BLOOD PRESSURE: 176 MMHG | DIASTOLIC BLOOD PRESSURE: 66 MMHG

## 2021-02-21 LAB
ANION GAP SERPL CALCULATED.3IONS-SCNC: 6 MMOL/L (ref 5–15)
BASOPHILS # BLD AUTO: 0.01 10*3/MM3 (ref 0–0.2)
BASOPHILS NFR BLD AUTO: 0.1 % (ref 0–1.5)
BUN SERPL-MCNC: 17 MG/DL (ref 8–23)
BUN/CREAT SERPL: 20.7 (ref 7–25)
CALCIUM SPEC-SCNC: 8.8 MG/DL (ref 8.6–10.5)
CHLORIDE SERPL-SCNC: 101 MMOL/L (ref 98–107)
CO2 SERPL-SCNC: 26 MMOL/L (ref 22–29)
CREAT SERPL-MCNC: 0.82 MG/DL (ref 0.57–1)
DEPRECATED RDW RBC AUTO: 39.3 FL (ref 37–54)
EOSINOPHIL # BLD AUTO: 0.09 10*3/MM3 (ref 0–0.4)
EOSINOPHIL NFR BLD AUTO: 1.1 % (ref 0.3–6.2)
ERYTHROCYTE [DISTWIDTH] IN BLOOD BY AUTOMATED COUNT: 12.6 % (ref 12.3–15.4)
GFR SERPL CREATININE-BSD FRML MDRD: 70 ML/MIN/1.73
GLUCOSE BLDC GLUCOMTR-MCNC: 175 MG/DL (ref 70–130)
GLUCOSE BLDC GLUCOMTR-MCNC: 278 MG/DL (ref 70–130)
GLUCOSE SERPL-MCNC: 147 MG/DL (ref 65–99)
HCT VFR BLD AUTO: 30 % (ref 34–46.6)
HGB BLD-MCNC: 10.4 G/DL (ref 12–15.9)
IMM GRANULOCYTES # BLD AUTO: 0.03 10*3/MM3 (ref 0–0.05)
IMM GRANULOCYTES NFR BLD AUTO: 0.4 % (ref 0–0.5)
LYMPHOCYTES # BLD AUTO: 2.73 10*3/MM3 (ref 0.7–3.1)
LYMPHOCYTES NFR BLD AUTO: 33.9 % (ref 19.6–45.3)
MCH RBC QN AUTO: 29.7 PG (ref 26.6–33)
MCHC RBC AUTO-ENTMCNC: 34.7 G/DL (ref 31.5–35.7)
MCV RBC AUTO: 85.7 FL (ref 79–97)
MONOCYTES # BLD AUTO: 0.69 10*3/MM3 (ref 0.1–0.9)
MONOCYTES NFR BLD AUTO: 8.6 % (ref 5–12)
NEUTROPHILS NFR BLD AUTO: 4.5 10*3/MM3 (ref 1.7–7)
NEUTROPHILS NFR BLD AUTO: 55.9 % (ref 42.7–76)
NRBC BLD AUTO-RTO: 0 /100 WBC (ref 0–0.2)
PLATELET # BLD AUTO: 162 10*3/MM3 (ref 140–450)
PMV BLD AUTO: 11.7 FL (ref 6–12)
POTASSIUM SERPL-SCNC: 4.6 MMOL/L (ref 3.5–5.2)
RBC # BLD AUTO: 3.5 10*6/MM3 (ref 3.77–5.28)
SODIUM SERPL-SCNC: 133 MMOL/L (ref 136–145)
WBC # BLD AUTO: 8.05 10*3/MM3 (ref 3.4–10.8)

## 2021-02-21 PROCEDURE — 85025 COMPLETE CBC W/AUTO DIFF WBC: CPT | Performed by: FAMILY MEDICINE

## 2021-02-21 PROCEDURE — 63710000001 INSULIN LISPRO (HUMAN) PER 5 UNITS: Performed by: NURSE PRACTITIONER

## 2021-02-21 PROCEDURE — 80048 BASIC METABOLIC PNL TOTAL CA: CPT | Performed by: FAMILY MEDICINE

## 2021-02-21 PROCEDURE — 82962 GLUCOSE BLOOD TEST: CPT

## 2021-02-21 PROCEDURE — 99231 SBSQ HOSP IP/OBS SF/LOW 25: CPT | Performed by: OTOLARYNGOLOGY

## 2021-02-21 RX ORDER — CLINDAMYCIN HYDROCHLORIDE 300 MG/1
300 CAPSULE ORAL 3 TIMES DAILY
Qty: 21 CAPSULE | Refills: 0 | Status: SHIPPED | OUTPATIENT
Start: 2021-02-21

## 2021-02-21 RX ORDER — CLINDAMYCIN HYDROCHLORIDE 150 MG/1
300 CAPSULE ORAL EVERY 8 HOURS SCHEDULED
Status: DISCONTINUED | OUTPATIENT
Start: 2021-02-21 | End: 2021-02-21 | Stop reason: HOSPADM

## 2021-02-21 RX ADMIN — INSULIN LISPRO 3 UNITS: 100 INJECTION, SOLUTION INTRAVENOUS; SUBCUTANEOUS at 08:38

## 2021-02-21 RX ADMIN — CLINDAMYCIN IN 5 PERCENT DEXTROSE 900 MG: 18 INJECTION, SOLUTION INTRAVENOUS at 08:38

## 2021-02-21 RX ADMIN — SODIUM CHLORIDE 100 ML/HR: 9 INJECTION, SOLUTION INTRAVENOUS at 08:38

## 2021-02-21 RX ADMIN — CLINDAMYCIN IN 5 PERCENT DEXTROSE 900 MG: 18 INJECTION, SOLUTION INTRAVENOUS at 01:38

## 2021-02-21 RX ADMIN — HYDROCODONE BITARTRATE AND ACETAMINOPHEN 1 TABLET: 10; 325 TABLET ORAL at 11:49

## 2021-02-21 RX ADMIN — INSULIN LISPRO 8 UNITS: 100 INJECTION, SOLUTION INTRAVENOUS; SUBCUTANEOUS at 11:49

## 2021-02-21 NOTE — PLAN OF CARE
Goal Outcome Evaluation:  Plan of Care Reviewed With: patient  Progress: no change  Outcome Summary: Medicated for c/o pain.  IVF, abx.  Voiding. Up with assist.  MANFRED claudio 153--3 units SS.  Resting this shift.

## 2021-02-21 NOTE — DISCHARGE SUMMARY
[facility] Medicine Services  DISCHARGE SUMMARY       Date of Admission: 2/19/2021  Date of Discharge:  2/21/2021  Primary Care Physician: Ramana Lynn MD    Discharge Diagnoses:  Active Hospital Problems    Diagnosis   • **Acute parotitis   • Mass of both parotid glands   • Hyponatremia   • Acute pain   • Type 2 diabetes mellitus with hyperglycemia, with long-term current use of insulin (CMS/HCC)   • Mixed diabetic hyperlipidemia associated with type 2 diabetes mellitus (CMS/HCC)   • Hypertension associated with diabetes (CMS/HCC)   • Diabetic polyneuropathy associated with type 2 diabetes mellitus (CMS/HCC)   • Uncontrolled type 2 diabetes mellitus, without long-term current use of insulin (CMS/[facility])         Presenting Problem/History of Present Illness:  Facial cellulitis [L03.211]     Chief Complaint on Day of Discharge:   Pain and swelling right neck and jawline    History of Present Illness on Day of Discharge:   The patient's discomfort is decreased somewhat.  She continues to have swelling that is unrelieved.  ENT has seen and evaluated the patient and agrees that this is unlikely to be infectious however we will continue to cover for staph species with oral clindamycin on an outpatient basis.  Both services agree that she is stable for discharge home.  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  The patient is being scheduled for an outpatient needle biopsy and will follow up with Dr. Iglesias in 2 weeks.    Hospital Course  Yolanda Jacobs is a 65-year-old female with a past medical history of chronic pain syndrome followed by local pain management, hypothyroidism, anxiety and depression, insulin-dependent diabetes, COPD with ongoing occasional tobacco use, hypertension, hyperlipidemia, strokes x3 with left-sided residual weakness.  Patient presented to [facility] emergency department with complaints of neck pain and swelling.  She states she initially  noticed Wednesday night before she went to bed.  She was awakened with pain and noticed the swelling.  She denies fever or hearing loss.  She called her PCP who could not see her until next week therefore she went to [facility] emergency department.  Patient received Rocephin and vancomycin, blood cultures were drawn.  She was transferred to [facility] direct admit for ENT consultation.  Currently she is stable.  No other complaints.   Patient states in 2018 on October 3 she underwent surgery to remove 2 cyst in the same area and during surgery she did have a stroke.  Surgery was per Dr. Peguero.   Plan:   1.  Admit as inpatient  2.  Consult ENT  3.  N.p.o. after midnight for possible invasive procedure tomorrow  4.  Normal saline 100 mL/hour  5.  DVT prophylaxis with Lovenox  6.  Monitor glucose before meals and at bedtime with regular insulin sliding scale coverage  7.  Home medication reviewed and restarted as appropriate  8.  Pain management  9.  Labs in a.m.  10.  Patient received Rocephin and vancomycin at outlying facility, will hold further treatment until recommendation received by ENT    Imaging obtained at [facility] was reviewed.  The patient was started on clindamycin 900 mg IV every 8 hours shortly after admission.  ENT saw and evaluated the patient and agreed that IV antibiotics were appropriate given the circumstance however symptoms did not specifically suggest an infectious etiology, rather recurrence of Warthin's tumors.      Consults:   ENT:  Plan:   I would go ahead and treat her with IV antibiotics to cover staph type species which would be common in a acute parotitis situation.  If she does not improve, we can consider doing a ultrasound-guided needle biopsy to see if there is any fluid that can be aspirated both to reduce the tenderness but also to get pathologic tissue.  At some point in time, she may require revision surgery but given her previous history of perioperative  strokes, I would need to get more information to make sure that this would be prevented.  Given the fact that this would be the third procedure on the parotid gland, she may benefit from referral to [facility].  However this would be considered only after her acute situation improves and can be evaluated on an outpatient basis.     Electronically signed by Jigar Iglesias MD  Plan:   I feel that she is able to be transitioned to oral clindamycin.  I have put in orders to have a fine-needle aspiration done under ultrasound guidance.  This can also be done as an outpatient.  I will have her set up to see us back in about 2 weeks to reevaluate.  We will need to get her old records to see what is actually been done and what the previous pathology was.  This will determine whether or not I feel that it is okay to proceed with surgery with me here in [place] versus referral to head neck surgeon.     Electronically signed by Jigar Iglesias MD    Pertinent Test Results:   Lab Results (last 7 days)     Procedure Component Value Units Date/Time    POC Glucose Once [[id number]]  (Abnormal) Collected: 02/21/21 1139    Specimen: Blood Updated: 02/21/21 1151     Glucose 278 mg/dL      Comment: : YOLANDA BrantleyyMeter ID: [id number]       POC Glucose Once [[id number]]  (Abnormal) Collected: 02/21/21 0827    Specimen: Blood Updated: 02/21/21 0848     Glucose 175 mg/dL      Comment: : YOLANDA Coloneter ID: [id number]       Basic Metabolic Panel [[id number]]  (Abnormal) Collected: 02/21/21 0410    Specimen: Blood Updated: 02/21/21 0519     Glucose 147 mg/dL      BUN 17 mg/dL      Creatinine 0.82 mg/dL      Sodium 133 mmol/L      Potassium 4.6 mmol/L      Chloride 101 mmol/L      CO2 26.0 mmol/L      Calcium 8.8 mg/dL      eGFR Non African Amer 70 mL/min/1.73      BUN/Creatinine Ratio 20.7     Anion Gap 6.0 mmol/L     Narrative:      GFR Normal >60  Chronic  Kidney Disease <60  Kidney Failure <15      CBC & Differential [[id number]]  (Abnormal) Collected: 02/21/21 0410    Specimen: Blood Updated: 02/21/21 0456    Narrative:      The following orders were created for panel order CBC & Differential.  Procedure                               Abnormality         Status                     ---------                               -----------         ------                     CBC Auto Differential[[id number]]        Abnormal            Final result                 Please view results for these tests on the individual orders.    CBC Auto Differential [[id number]]  (Abnormal) Collected: 02/21/21 0410    Specimen: Blood Updated: 02/21/21 0456     WBC 8.05 10*3/mm3      RBC 3.50 10*6/mm3      Hemoglobin 10.4 g/dL      Hematocrit 30.0 %      MCV 85.7 fL      MCH 29.7 pg      MCHC 34.7 g/dL      RDW 12.6 %      RDW-SD 39.3 fl      MPV 11.7 fL      Platelets 162 10*3/mm3      Neutrophil % 55.9 %      Lymphocyte % 33.9 %      Monocyte % 8.6 %      Eosinophil % 1.1 %      Basophil % 0.1 %      Immature Grans % 0.4 %      Neutrophils, Absolute 4.50 10*3/mm3      Lymphocytes, Absolute 2.73 10*3/mm3      Monocytes, Absolute 0.69 10*3/mm3      Eosinophils, Absolute 0.09 10*3/mm3      Basophils, Absolute 0.01 10*3/mm3      Immature Grans, Absolute 0.03 10*3/mm3      nRBC 0.0 /100 WBC     POC Glucose Once [[id number]]  (Abnormal) Collected: 02/20/21 2037    Specimen: Blood Updated: 02/20/21 2049     Glucose 153 mg/dL      Comment: : [id number] Wendy Stevens ID: [id number]       POC Glucose Once [[id number]]  (Abnormal) Collected: 02/20/21 1713    Specimen: Blood Updated: 02/20/21 1734     Glucose 191 mg/dL      Comment: : [id number] Omar DeannaMeter ID: [id number]       POC Glucose Once [[id number]]  (Abnormal) Collected: 02/20/21 1114    Specimen: Blood Updated: 02/20/21 1126     Glucose 188 mg/dL      Comment: : [id number] Omar DeannaMeter ID: [id number]       POC  Glucose Once [[id number]]  (Abnormal) Collected: 02/20/21 0756    Specimen: Blood Updated: 02/20/21 0807     Glucose 187 mg/dL      Comment: : [id number] Jose Murray ID: [id number]       Basic Metabolic Panel [[id number]]  (Abnormal) Collected: 02/20/21 0400    Specimen: Blood Updated: 02/20/21 0457     Glucose 183 mg/dL      BUN 15 mg/dL      Creatinine 0.82 mg/dL      Sodium 131 mmol/L      Potassium 3.8 mmol/L      Chloride 94 mmol/L      CO2 29.0 mmol/L      Calcium 9.2 mg/dL      eGFR Non African Amer 70 mL/min/1.73      BUN/Creatinine Ratio 18.3     Anion Gap 8.0 mmol/L     Narrative:      GFR Normal >60  Chronic Kidney Disease <60  Kidney Failure <15      CBC & Differential [[id number]]  (Abnormal) Collected: 02/20/21 0400    Specimen: Blood Updated: 02/20/21 0450    Narrative:      The following orders were created for panel order CBC & Differential.  Procedure                               Abnormality         Status                     ---------                               -----------         ------                     CBC Auto Differential[[id number]]        Abnormal            Final result                 Please view results for these tests on the individual orders.    CBC Auto Differential [[id number]]  (Abnormal) Collected: 02/20/21 0400    Specimen: Blood Updated: 02/20/21 0450     WBC 7.78 10*3/mm3      RBC 3.75 10*6/mm3      Hemoglobin 11.2 g/dL      Hematocrit 31.8 %      MCV 84.8 fL      MCH 29.9 pg      MCHC 35.2 g/dL      RDW 12.6 %      RDW-SD 38.5 fl      MPV 11.9 fL      Platelets 171 10*3/mm3      Neutrophil % 60.4 %      Lymphocyte % 28.1 %      Monocyte % 9.6 %      Eosinophil % 1.0 %      Basophil % 0.4 %      Immature Grans % 0.5 %      Neutrophils, Absolute 4.69 10*3/mm3      Lymphocytes, Absolute 2.19 10*3/mm3      Monocytes, Absolute 0.75 10*3/mm3      Eosinophils, Absolute 0.08 10*3/mm3      Basophils, Absolute 0.03 10*3/mm3      Immature Grans, Absolute 0.04 10*3/mm3       nRBC 0.0 /100 WBC     Basic Metabolic Panel [[id number]]  (Abnormal) Collected: 02/19/21 2306    Specimen: Blood Updated: 02/20/21 0022     Glucose 343 mg/dL      BUN 15 mg/dL      Creatinine 0.79 mg/dL      Sodium 128 mmol/L      Potassium 4.4 mmol/L      Chloride 91 mmol/L      CO2 29.0 mmol/L      Calcium 9.4 mg/dL      eGFR Non African Amer 74 mL/min/1.73      BUN/Creatinine Ratio 19.0     Anion Gap 8.0 mmol/L     Narrative:      GFR Normal >60  Chronic Kidney Disease <60  Kidney Failure <15      CBC & Differential [[id number]]  (Abnormal) Collected: 02/19/21 2306    Specimen: Blood Updated: 02/20/21 0005    Narrative:      The following orders were created for panel order CBC & Differential.  Procedure                               Abnormality         Status                     ---------                               -----------         ------                     CBC Auto Differential[[id number]]        Abnormal            Final result                 Please view results for these tests on the individual orders.    CBC Auto Differential [[id number]]  (Abnormal) Collected: 02/19/21 2306    Specimen: Blood Updated: 02/20/21 0005     WBC 8.84 10*3/mm3      RBC 3.93 10*6/mm3      Hemoglobin 11.9 g/dL      Hematocrit 33.1 %      MCV 84.2 fL      MCH 30.3 pg      MCHC 36.0 g/dL      RDW 12.5 %      RDW-SD 38.4 fl      MPV 12.3 fL      Platelets 191 10*3/mm3      Neutrophil % 62.3 %      Lymphocyte % 26.9 %      Monocyte % 9.4 %      Eosinophil % 0.8 %      Basophil % 0.3 %      Immature Grans % 0.3 %      Neutrophils, Absolute 5.50 10*3/mm3      Lymphocytes, Absolute 2.38 10*3/mm3      Monocytes, Absolute 0.83 10*3/mm3      Eosinophils, Absolute 0.07 10*3/mm3      Basophils, Absolute 0.03 10*3/mm3      Immature Grans, Absolute 0.03 10*3/mm3      nRBC 0.0 /100 WBC     POC Glucose Once [[id number]]  (Abnormal) Collected: 02/19/21 2257    Specimen: Blood Updated: 02/19/21 2333     Glucose 332 mg/dL      Comment: :  [id number] Alec Marinoter ID: [id number]         Exam: CT Maxillofacial without contrast.  CT face without contrast  Date: 2/19/2021   Indication: 65 years-year-old Female with right face swelling.  Comparison: None.  Technique: A series of 2.5  mm transaxial tomographic images are  obtained from the inferior aspect of the mandible through the frontal  sinuses. Coronal reformats are performed.  Radiation dose equals  mGy-cm.  Automated exposure control dose  reduction technique was implemented.  Findings:   CT neck and CT face without contrast: This is a limited examination  due to lack of intravenous contrast. Within those limitations: There  is a 1.8 cm right neck hyperdense mass with central hypodense  character. Adjacent fluid and fat stranding. It appears inseparable  from the parotid tail. A similar lesion without inflammatory changes  present on the left, measuring 2.2 x 1.4 cm.  There is no otherwise pathologically enlarged lymph node in the neck.  The visualized intracranial structures, oral cavity, nasopharynx,  retropharynx, larynx demonstrate no discrete mass or fluid collection.  The visualized lung apices are unremarkable.  There is atherosclerotic disease.  There is no acute osseous pathology.    Condition on Discharge:    Stable    Physical Exam on Discharge:  /66 (BP Location: Left arm, Patient Position: Lying) Comment: Martha PICKARD notified  Pulse 83   Temp 97.8 °F (36.6 °C) (Oral)   Resp 16   Ht 157.5 cm (62\")   Wt 73.3 kg (161 lb 11.2 oz)   SpO2 98%   BMI 29.58 kg/m²   Physical Exam           Discharge Disposition:  Home or Self Care    Discharge Medications:     Discharge Medications      New Medications      Instructions Start Date   clindamycin 300 MG capsule  Commonly known as: Cleocin   300 mg, Oral, 3 Times Daily         Changes to Medications      Instructions Start Date   aspirin 81 MG tablet  What changed: how much to take   81 mg, Oral, Daily      gabapentin 400 MG " capsule  Commonly known as: NEURONTIN  What changed:   · how much to take  · when to take this   2 capsules TID      HYDROcodone-acetaminophen  MG per tablet  Commonly known as: NORCO  What changed: Another medication with the same name was removed. Continue taking this medication, and follow the directions you see here.   1 tablet, Oral, Every 8 Hours PRN         Continue These Medications      Instructions Start Date   albuterol 0.63 MG/3ML nebulizer solution  Commonly known as: ACCUNEB   Take 1 ampule by nebulization every 6 hours as needed for Wheezing      Alcohol Wipes pads   Use 4 x daily      ALPRAZolam 1 MG tablet  Commonly known as: XANAX   1 mg, Oral, 3 Times Daily PRN      amitriptyline 10 MG tablet  Commonly known as: ELAVIL   10 mg, Oral, Nightly      atorvastatin 40 MG tablet  Commonly known as: LIPITOR   40 mg, Oral, Daily      Baclofen 2 % cream   Apply externally, 3 Times Daily      Blood Glucose Monitoring Suppl w/Device kit   USE AS INDICATED, ANY MONITOR      Blood Glucose Test strip   Use 4 x daily, use any brand covered by insurance or same brand as before       calcipotriene 0.005 % cream  Commonly known as: DOVONEX   Topical, 2 Times Daily      cetirizine 10 MG tablet  Commonly known as: zyrTEC   10 mg, Oral, Daily      cyclobenzaprine 10 MG tablet  Commonly known as: FLEXERIL   10 mg, Oral, 3 Times Daily PRN      dilTIAZem  MG 24 hr capsule  Commonly known as: CARDIZEM CD   180 mg, Oral, Daily      escitalopram 20 MG tablet  Commonly known as: LEXAPRO   20 mg, Oral, Daily      esomeprazole 40 MG capsule  Commonly known as: nexIUM   40 mg, Oral, Every Morning Before Breakfast      fluconazole 150 MG tablet  Commonly known as: DIFLUCAN   150 mg, Oral, Weekly      fluticasone-salmeterol 250-50 MCG/DOSE DISKUS  Commonly known as: ADVAIR   1 puff, Inhalation, 2 Times Daily - RT      furosemide 40 MG tablet  Commonly known as: LASIX   40 mg, Oral, 2 Times Daily      Gabapentin 10 %  cream   6 %, Apply externally, 3 Times Daily      insulin aspart 100 UNIT/ML solution pen-injector sc pen  Commonly known as: NovoLOG FlexPen   Up to 5-40 units with meals      Insulin Pen Needle 31G X 5 MM misc  Commonly known as: B-D UF III MINI PEN NEEDLES   Use 4 times daily       Lancets 30G misc   USE 4 X DAILY      Lancing Device misc   USE AS INDICATED TO CORRELATE WITH STRIPS AND METER      levothyroxine 25 MCG tablet  Commonly known as: SYNTHROID, LEVOTHROID   25 mcg, Oral, Daily      Lidocaine HCl 2.5 % gel   Apply externally, 3 Times Daily      lisinopril 20 MG tablet  Commonly known as: PRINIVIL,ZESTRIL   40 mg, Oral, Daily      loratadine 10 MG tablet  Commonly known as: CLARITIN   10 mg, Oral, Daily      metFORMIN  MG 24 hr tablet  Commonly known as: Glucophage XR   2 tabs twice daily with meals , generic ok      sulfamethoxazole-trimethoprim 400-80 MG tablet  Commonly known as: BACTRIM,SEPTRA   1 tablet, Oral, 2 Times Daily      tiotropium 18 MCG per inhalation capsule  Commonly known as: SPIRIVA   1 capsule, Inhalation, Daily - RT      Tresiba FlexTouch 200 UNIT/ML solution pen-injector pen injection  Generic drug: Insulin Degludec   INJECT 70 UNITS UNDER SKIN AS DIRECTED EVERY NIGHT; UP  UNITS DAILY      Trulicity 0.75 MG/0.5ML solution pen-injector  Generic drug: Dulaglutide   INJECT THE CONTENTS OF 1 PEN UNDER THE SKIN ON THE SAME DAY EACH WEEK.      varenicline 0.5 MG tablet  Commonly known as: CHANTIX   0.5 mg, Oral, 2 Times Daily             Discharge Diet:   Diet Instructions     Diet: Regular; Thin      Discharge Diet: Regular    Fluid Consistency: Thin          Discharge Care Plan / Instructions:   Discharge home    Activity at Discharge:   Activity Instructions     Activity as Tolerated            Follow-up Appointments:  Follow-up with primary care physician next week  Outpatient FNA as scheduled by Dr. Iglesias  Follow-up with Dr. Iglesias in 2 weeks       Electronically signed  by Abhinav Rojas DO, 02/21/21, 12:57 CST.    Time: Discharge less than 30 min    Part of this note may be an electronic transcription/translation of spoken language to printed text using the Dragon Dictation system.

## 2021-02-21 NOTE — PROGRESS NOTES
[facility]   Progress Note    Patient Name: Yolanda Mendenhall  : 1955  MRN: [id number]  Primary Care Physician: Ramana Lynn MD  Date of admission: 2021    Subjective   Subjective     Chief Complaint: Right facial swelling    History of Present Illness  Patient Reports slight improvement from yesterday.  She has not had as much soreness and seems to have slightly decreased amount of swelling.  There has been no fevers and her white count has been normal.      Review of Systems   Constitutional: Negative for chills, fatigue and fever.   HENT:        As per HPI   Gastrointestinal: Negative for nausea and vomiting.   Endocrine:        As per HPI       Objective   Objective     Vitals:  Temp:  [97.2 °F (36.2 °C)-99.1 °F (37.3 °C)] 97.8 °F (36.6 °C)  Heart Rate:  [74-90] 86  Resp:  [14-16] 16  BP: (147-160)/(54-66) 147/60    Physical Exam  Vitals signs reviewed.   Constitutional:       Appearance: Normal appearance. She is well-developed.   HENT:      Head: Normocephalic and atraumatic.      Jaw: Jaw tenderness: bhesig.      Comments: There continues to be right-sided facial swelling located at the right tail parotid region.  It is slightly less than yesterday.  It .  This is under a well-healed scar.     Right Ear: External ear normal.      Left Ear: External ear normal.      Nose: Nose normal.   Eyes:      Extraocular Movements: Extraocular movements intact.      Conjunctiva/sclera: Conjunctivae normal.   Neck:      Musculoskeletal: Normal range of motion.   Pulmonary:      Effort: Pulmonary effort is normal. No respiratory distress.      Breath sounds: No stridor.   Musculoskeletal: Normal range of motion.   Skin:     Findings: No rash.   Neurological:      General: No focal deficit present.      Mental Status: She is alert.      Cranial Nerves: No cranial nerve deficit.   Psychiatric:         Behavior: Behavior normal.         Thought Content: Thought content normal.         Judgment:  Judgment normal.          Result Review    Result Review:  I have personally reviewed the results from the time of this admission to 02/21/21 9:44 AM CST and agree with these findings:  [x]  Laboratory- normal white count  []  Microbiology  []  Radiology  []  EKG/Telemetry   []  Cardiology/Vascular   []  Pathology  []  Old records  []  Other:  Most notable findings include: Normal white count    Assessment/Plan   Assessment / Plan     Brief Patient Summary:  Yolanda Mendenhall is a 65 y.o. 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  She has had 3 previous surgeries.  She seems to be slightly improved with antibiotics but I do not feel this is a infection but rather most likely represents fluid accumulation from her Davis's tumors.     Active Hospital Problems:  Active Hospital Problems    Diagnosis   • **Acute parotitis   • Mass of both parotid glands   • Hyponatremia   • Acute pain   • Type 2 diabetes mellitus with hyperglycemia, with long-term current use of insulin (CMS/HCC)   • Mixed diabetic hyperlipidemia associated with type 2 diabetes mellitus (CMS/HCC)   • Hypertension associated with diabetes (CMS/HCC)   • Diabetic polyneuropathy associated with type 2 diabetes mellitus (CMS/HCC)   • Uncontrolled type 2 diabetes mellitus, without long-term current use of insulin (CMS/HCC)       Plan:   I feel that she is able to be transitioned to oral clindamycin.  I have put in orders to have a fine-needle aspiration done under ultrasound guidance.  This can also be done as an outpatient.  I will have her set up to see us back in about 2 weeks to reevaluate.  We will need to get her old records to see what is actually been done and what the previous pathology was.  This will determine whether or not I feel that it is okay to proceed with surgery with me here in [place] versus referral to head neck surgeon.    Electronically signed by Jigar Iglesias MD, 02/21/21, 9:48 AM  CST.

## 2021-02-22 ENCOUNTER — READMISSION MANAGEMENT (OUTPATIENT)
Dept: CALL CENTER | Facility: HOSPITAL | Age: 66
End: 2021-02-22

## 2021-02-22 ENCOUNTER — OFFICE VISIT (OUTPATIENT)
Dept: FAMILY MEDICINE CLINIC | Age: 66
End: 2021-02-22
Payer: MEDICARE

## 2021-02-22 VITALS
DIASTOLIC BLOOD PRESSURE: 90 MMHG | SYSTOLIC BLOOD PRESSURE: 160 MMHG | OXYGEN SATURATION: 99 % | WEIGHT: 158 LBS | HEIGHT: 62 IN | TEMPERATURE: 97.1 F | BODY MASS INDEX: 29.08 KG/M2 | HEART RATE: 98 BPM

## 2021-02-22 DIAGNOSIS — K11.21 ACUTE PAROTITIS: Primary | ICD-10-CM

## 2021-02-22 DIAGNOSIS — E78.2 MIXED HYPERLIPIDEMIA: ICD-10-CM

## 2021-02-22 DIAGNOSIS — F17.211 CIGARETTE NICOTINE DEPENDENCE IN REMISSION: ICD-10-CM

## 2021-02-22 DIAGNOSIS — Z79.4 TYPE 2 DIABETES MELLITUS WITH HYPERGLYCEMIA, WITH LONG-TERM CURRENT USE OF INSULIN (HCC): ICD-10-CM

## 2021-02-22 DIAGNOSIS — K11.8 MASS OF BOTH PAROTID GLANDS: ICD-10-CM

## 2021-02-22 DIAGNOSIS — E11.65 TYPE 2 DIABETES MELLITUS WITH HYPERGLYCEMIA, WITH LONG-TERM CURRENT USE OF INSULIN (HCC): ICD-10-CM

## 2021-02-22 DIAGNOSIS — I10 ESSENTIAL HYPERTENSION: ICD-10-CM

## 2021-02-22 PROCEDURE — 99215 OFFICE O/P EST HI 40 MIN: CPT | Performed by: FAMILY MEDICINE

## 2021-02-22 PROCEDURE — 1111F DSCHRG MED/CURRENT MED MERGE: CPT | Performed by: FAMILY MEDICINE

## 2021-02-22 RX ORDER — CLINDAMYCIN HYDROCHLORIDE 300 MG/1
300 CAPSULE ORAL 3 TIMES DAILY
COMMUNITY
Start: 2021-02-22 | End: 2022-01-18

## 2021-02-22 ASSESSMENT — ENCOUNTER SYMPTOMS
ABDOMINAL PAIN: 0
EYE PAIN: 0
DIARRHEA: 0
EYE DISCHARGE: 0
SHORTNESS OF BREATH: 0
BACK PAIN: 1
CONSTIPATION: 0
COUGH: 0
NAUSEA: 0
VOMITING: 0
RHINORRHEA: 0

## 2021-02-22 ASSESSMENT — PATIENT HEALTH QUESTIONNAIRE - PHQ9
10. IF YOU CHECKED OFF ANY PROBLEMS, HOW DIFFICULT HAVE THESE PROBLEMS MADE IT FOR YOU TO DO YOUR WORK, TAKE CARE OF THINGS AT HOME, OR GET ALONG WITH OTHER PEOPLE: 1
9. THOUGHTS THAT YOU WOULD BE BETTER OFF DEAD, OR OF HURTING YOURSELF: 0
5. POOR APPETITE OR OVEREATING: 1
8. MOVING OR SPEAKING SO SLOWLY THAT OTHER PEOPLE COULD HAVE NOTICED. OR THE OPPOSITE, BEING SO FIGETY OR RESTLESS THAT YOU HAVE BEEN MOVING AROUND A LOT MORE THAN USUAL: 3
7. TROUBLE CONCENTRATING ON THINGS, SUCH AS READING THE NEWSPAPER OR WATCHING TELEVISION: 3
3. TROUBLE FALLING OR STAYING ASLEEP: 1

## 2021-02-22 ASSESSMENT — COLUMBIA-SUICIDE SEVERITY RATING SCALE - C-SSRS
1. WITHIN THE PAST MONTH, HAVE YOU WISHED YOU WERE DEAD OR WISHED YOU COULD GO TO SLEEP AND NOT WAKE UP?: NO
2. HAVE YOU ACTUALLY HAD ANY THOUGHTS OF KILLING YOURSELF?: NO

## 2021-02-22 NOTE — PROGRESS NOTES
Formerly [facility]  [facility] [place]  [address] [address]  Dept: [phone]  Dept Fax: [phone]  Loc: [phone]     Visit type: {New vs Established:[id number]::\"Established patient\"}    Reason for Visit: Cyst (knot on both sides of her neck )      Assessment and Plan       {There are no diagnoses linked to this encounter. (Refresh or delete this SmartLink)}    No follow-ups on file. Subjective       HPI   Acute parotitis and mass of both parotid glands. Was in the hospital and has been discharged on clinda. Erythema but was prescribed clinda which she is picking up today. Has to get a biopsy and follow up with [facility]. For further management and     Discharged from the hospital yesterday. Jain    Acute parotitis, mass of both parotid glands.      Review of Systems     Allergies   Allergen Reactions    Ceftin [Cefuroxime Axetil] Swelling    Codeine Nausea Only    Dye [Iodides] Swelling and Other (See Comments)     SOB    Iodinated Diagnostic Agents        Outpatient Medications Prior to Visit   Medication Sig Dispense Refill    cetirizine (ZYRTEC) 10 MG tablet Take 1 tablet by mouth 1 time daily 90 tablet 1    escitalopram (LEXAPRO) 20 MG tablet Take 1 tablet by mouth 1 time daily 90 tablet 1    esomeprazole (NEXIUM) 40 MG delayed release capsule TAKE 1 CAPSULE BY MOUTH EVERY MORNING BEFORE BREAKFAST 90 capsule 1    fluticasone (FLONASE) 50 MCG/ACT nasal spray USE 2 SPRAYS IN EACH NOSTRIL DAILY 3 Bottle 1    levothyroxine (SYNTHROID) 25 MCG tablet TAKE 1 TABLET BY MOUTH 1 TIME DAILY 90 tablet 1    lisinopril (PRINIVIL;ZESTRIL) 40 MG tablet TAKE 1 TABLET BY MOUTH EVERY DAY 90 tablet 1    atorvastatin (LIPITOR) 40 MG tablet TAKE 1 TABLET BY MOUTH 1 TIME DAILY 90 tablet 1    albuterol sulfate  (90 Base) MCG/ACT inhaler INHALE 2 PUFFS BY MOUTH EVERY 6 HOURS AS NEEDED 18 g 3  HUMALOG KWIKPEN 100 UNIT/ML SOPN INJECT 40 UNITS UNDER THE SKIN THREE TIMES A DAY BEFORE MEALS 15 mL 5    ALPRAZolam (XANAX) 1 MG tablet 1/2 to 1 tablet every 8 hours as needed for anxiety 65 tablet 2    furosemide (LASIX) 40 MG tablet TAKE 1 TABLET BY MOUTH TWO TIMES A DAY 60 tablet 0    Lancets MISC Test up to 3 times daily. 100 each 3    Insulin Pen Needle (PEN NEEDLES) 31G X 6 MM MISC 1 each by Does not apply route daily 100 each 3    blood glucose monitor strips Test up to 3 times a day & as needed for symptoms of irregular blood glucose. 100 strip 5    NOVOLOG FLEXPEN 100 UNIT/ML injection pen INJECT UNDER THE SKIN 40 UNITS THREE TIMES A DAY BEFORE MEALS 15 mL 3    TRESIBA FLEXTOUCH 200 UNIT/ML SOPN INJECT 100 UNITS SUBQ IN THE EVENING 18 mL 5    aspirin (KSENIA ASPIRIN) 325 MG tablet Take 1 tablet by mouth daily 30 tablet 3    Diabetic Shoe MISC by Does not apply route I pair of shoes with 3 pair of heat molded inserts. 1 each 0    fluticasone-salmeterol (ADVAIR DISKUS) 250-50 MCG/DOSE AEPB Inhale 1 puff into the lungs every 12 hours 60 each 5    Insulin Degludec (TRESIBA FLEXTOUCH) 200 UNIT/ML SOPN Inject 100 Units into the skin nightly 5 pen 5    triamcinolone (KENALOG) 0.1 % cream Apply topically 2 times daily. 80 g 5    albuterol (ACCUNEB) 0.63 MG/3ML nebulizer solution Take 3 mLs by nebulization every 6 hours as needed for Wheezing 270 mL 5    gabapentin (NEURONTIN) 400 MG capsule Take 2 capsules by mouth 3 times daily. 180 capsule 1     No facility-administered medications prior to visit. Past Medical History:   Diagnosis Date    Anxiety     Arthritis     Bilateral cataracts     Chronic kidney disease     [facility] [address]     Diabetes mellitus ([facility] [address]     Diabetes mellitus ([facility] [address]     Hyperlipidemia     Cholesterol management per pcp.      Hypertension     IBS (irritable bowel syndrome)     Neuropathy     Pericardial effusion  Peripheral vascular disease (HCC)     Smoker     Type 2 diabetes mellitus without complication (HCC)         Social History     Tobacco Use    Smoking status: Current Every Day Smoker     Packs/day: 1.00     Years: 40.00     Pack years: 40.00     Types: Cigarettes    Smokeless tobacco: Never Used    Tobacco comment: pt is trying to quit smoking   Substance Use Topics    Alcohol use: No        Past Surgical History:   Procedure Laterality Date    APPENDECTOMY      CHOLECYSTECTOMY      COLONOSCOPY      ENDOSCOPY, COLON, DIAGNOSTIC      FINGER SURGERY      HYSTERECTOMY, TOTAL ABDOMINAL         Family History   Problem Relation Age of Onset    Cancer Mother     Cancer Father     Heart Failure Other     Heart Failure Maternal Grandfather        Objective       BP (!) 160/90 (Site: Left Upper Arm, Position: Sitting, Cuff Size: Large Adult)   Pulse 98   Temp 97.1 °F (36.2 °C) (Infrared)   Ht 5' 2\" (1.575 m)   Wt 158 lb (71.7 kg)   SpO2 99%   BMI 28.90 kg/m²   Physical Exam      Data Reviewed and Summarized       Labs:     Imaging/Testing:        Eda Martin MD

## 2021-02-22 NOTE — PATIENT INSTRUCTIONS

## 2021-02-22 NOTE — PROGRESS NOTES
Post-Discharge Transitional Care Management Services or Hospital Follow Up      Juan C Luu   YOB: 1955    Date of Office Visit:  2/22/2021  Date of Hospital Admission: 2/19/21  Date of Hospital Discharge: 2/21/20    Care management risk score Rising risk (score 2-5) and Complex Care (Scores >=6): 6     Non face to face  following discharge, date last encounter closed (first attempt may have been earlier): *No documented post hospital discharge outreach found in the last 14 days     Call initiated 2 business days of discharge: *No response recorded in the last 14 days    Patient Active Problem List   Diagnosis    Pericardial effusion    Hypertension    Chest pain    ALVARADO (dyspnea on exertion)    Edema    Smoker    Type 2 diabetes mellitus with microalbuminuria, with long-term current use of insulin ([facility] [address]    PVD (peripheral vascular disease) ([facility] [address]    Diabetic polyneuropathy associated with type 2 diabetes mellitus ([facility] [address]    Tobacco use       Allergies   Allergen Reactions    Ceftin [Cefuroxime Axetil] Swelling    Codeine Nausea Only    Dye [Iodides] Swelling and Other (See Comments)     SOB    Iodinated Diagnostic Agents        Medications listed as ordered at the time of discharge from hospital  Reviewed and reconciled with medication list    Medications marked \"taking\" at this time  Outpatient Medications Marked as Taking for the 2/22/21 encounter (Office Visit) with Jean Pierre Barcenas MD   Medication Sig Dispense Refill    clindamycin (CLEOCIN) 300 MG capsule Take 300 mg by mouth 3 times daily      cetirizine (ZYRTEC) 10 MG tablet Take 1 tablet by mouth 1 time daily 90 tablet 1    escitalopram (LEXAPRO) 20 MG tablet Take 1 tablet by mouth 1 time daily 90 tablet 1    esomeprazole (NEXIUM) 40 MG delayed release capsule TAKE 1 CAPSULE BY MOUTH EVERY MORNING BEFORE BREAKFAST 90 capsule 1    fluticasone (FLONASE) 50 MCG/ACT nasal spray USE 2 SPRAYS IN EACH NOSTRIL DAILY 3 Bottle 1    levothyroxine (SYNTHROID) 25 MCG tablet TAKE 1 TABLET BY MOUTH 1 TIME DAILY 90 tablet 1    lisinopril (PRINIVIL;ZESTRIL) 40 MG tablet TAKE 1 TABLET BY MOUTH EVERY DAY 90 tablet 1    atorvastatin (LIPITOR) 40 MG tablet TAKE 1 TABLET BY MOUTH 1 TIME DAILY 90 tablet 1    albuterol sulfate  (90 Base) MCG/ACT inhaler INHALE 2 PUFFS BY MOUTH EVERY 6 HOURS AS NEEDED 18 g 3    HUMALOG KWIKPEN 100 UNIT/ML SOPN INJECT 40 UNITS UNDER THE SKIN THREE TIMES A DAY BEFORE MEALS 15 mL 5    ALPRAZolam (XANAX) 1 MG tablet 1/2 to 1 tablet every 8 hours as needed for anxiety 65 tablet 2    furosemide (LASIX) 40 MG tablet TAKE 1 TABLET BY MOUTH TWO TIMES A DAY 60 tablet 0    Lancets MISC Test up to 3 times daily. 100 each 3    Insulin Pen Needle (PEN NEEDLES) 31G X 6 MM MISC 1 each by Does not apply route daily 100 each 3    blood glucose monitor strips Test up to 3 times a day & as needed for symptoms of irregular blood glucose. 100 strip 5    NOVOLOG FLEXPEN 100 UNIT/ML injection pen INJECT UNDER THE SKIN 40 UNITS THREE TIMES A DAY BEFORE MEALS 15 mL 3    TRESIBA FLEXTOUCH 200 UNIT/ML SOPN INJECT 100 UNITS SUBQ IN THE EVENING 18 mL 5    aspirin (KSENIA ASPIRIN) 325 MG tablet Take 1 tablet by mouth daily 30 tablet 3    Diabetic Shoe MISC by Does not apply route I pair of shoes with 3 pair of heat molded inserts. 1 each 0    fluticasone-salmeterol (ADVAIR DISKUS) 250-50 MCG/DOSE AEPB Inhale 1 puff into the lungs every 12 hours 60 each 5    Insulin Degludec (TRESIBA FLEXTOUCH) 200 UNIT/ML SOPN Inject 100 Units into the skin nightly 5 pen 5    triamcinolone (KENALOG) 0.1 % cream Apply topically 2 times daily.  80 g 5    albuterol (ACCUNEB) 0.63 MG/3ML nebulizer solution Take 3 mLs by nebulization every 6 hours as needed for Wheezing 270 mL 5        Medications patient taking as of now reconciled against medications ordered at time of hospital discharge: Yes    Chief Complaint   Patient presents with    Cyst     knot on both sides of her neck        HPI    Inpatient course: Discharge summary reviewed- see chart. Interval history/Current status: Patient was admitted to the hospital with acute parotitis and mass involving both parotid glands. She has had a history of neck surgery previously and on the right side of her neck has swelling with erythema and was treated in the hospital with antibiotics and sent home with clindamycin which she is picking up today and starting. She was seen by Dr. Joey Lara, ENT with plans for follow-up and biopsy of the parotid mass and further management of the involved area. She does report that depending on the location and previous surgeries that have been done on her neck she reports that he may end up getting [facility] to [facility] management. She reports that there is tender and painful but is better than it was. She does have history of uncontrolled type 2 diabetes and is following with endocrinology and has had some elevated blood sugars but was doing well until the neck problems started. She states that she is not having symptoms from her diabetes and with the exception of the elevated blood sugars due to the infection is doing well otherwise. She does have arterial hypertension her blood pressure is up but she contributes that to the pain. She states that her blood pressure has been doing well otherwise and has not had any problems recently. She also has hyperlipidemia is tolerating her Lipitor without any myalgias or GI upsets. She states that she has not been smoking and whenever she gets her usual use the nicotine patches but has continued to remain smoke-free. Vitals:    02/22/21 0855   BP: (!) 160/90   Site: Left Upper Arm   Position: Sitting   Cuff Size: Large Adult   Pulse: 98   Temp: 97.1 °F (36.2 °C)   TempSrc: Infrared   SpO2: 99%   Weight: 158 lb (71.7 kg)   Height: 5' 2\" (1.575 m)     Body mass index is 28.9 kg/m².    Wt Readings from Last 3 Encounters: 02/22/21 158 lb (71.7 kg)   02/19/21 150 lb (68 kg)   11/16/20 140 lb (63.5 kg)     BP Readings from Last 3 Encounters:   02/22/21 (!) 160/90   02/19/21 (!) 167/64   11/16/20 130/76       Review of Systems   Constitutional: Negative for activity change, appetite change, fatigue and fever. HENT: Negative for congestion and rhinorrhea. Eyes: Negative for pain and discharge. Respiratory: Negative for cough and shortness of breath. Cardiovascular: Negative for chest pain and palpitations. Gastrointestinal: Negative for abdominal pain, constipation, diarrhea, nausea and vomiting. Endocrine: Negative for cold intolerance and heat intolerance. Genitourinary: Negative for dysuria, frequency, hematuria and urgency. Musculoskeletal: Positive for arthralgias and back pain. Negative for gait problem and neck pain. Skin: Positive for rash. Negative for wound. Neurological: Negative for syncope and weakness. Hematological: Negative for adenopathy. Does not bruise/bleed easily. Psychiatric/Behavioral: Negative for dysphoric mood. The patient is nervous/anxious. Physical Exam  Vitals signs and nursing note reviewed. Constitutional:       General: She is not in acute distress. Appearance: She is well-developed. She is not diaphoretic. HENT:      Head: Normocephalic and atraumatic. Right Ear: External ear normal.      Left Ear: External ear normal.      Nose: Nose normal.   Eyes:      General: No scleral icterus. Right eye: No discharge. Left eye: No discharge. Conjunctiva/sclera: Conjunctivae normal.   Neck:      Musculoskeletal: Neck supple. Thyroid: No thyromegaly. Trachea: No tracheal deviation. Cardiovascular:      Rate and Rhythm: Normal rate and regular rhythm. Heart sounds: Normal heart sounds. No friction rub. No gallop. Pulmonary:      Effort: Pulmonary effort is normal. No respiratory distress. Breath sounds: Normal breath sounds. No wheezing or rales. Abdominal:      General: Bowel sounds are normal. There is no distension. Palpations: Abdomen is soft. Tenderness: There is no abdominal tenderness. Musculoskeletal:         General: No deformity (No gross deformities of upper or lower extremities). Lymphadenopathy:      Cervical: No cervical adenopathy. Skin:     General: Skin is warm and dry. Findings: Erythema present. No rash. Comments: Erythema and swelling with tenderness involving the right side of her neck just inferior to her right ear. No appreciable drainage. Neurological:      Mental Status: She is alert and oriented to person, place, and time. Cranial Nerves: No cranial nerve deficit. Psychiatric:         Behavior: Behavior normal.         Thought Content: Thought content normal.               Assessment/Plan:  1. Acute parotitis  Stressed importance of being compliant with the medication prescribed upon discharge from the hospital.  Stressed importance of maintaining follow-up with ENT for further evaluation and management. - DE DISCHARGE MEDS RECONCILED W/ CURRENT OUTPATIENT MED LIST    2. Mass of both parotid glands  Stressed importance of keeping her appointment for follow-up with ENT for biopsy and further evaluation/management. - DE DISCHARGE MEDS RECONCILED W/ CURRENT OUTPATIENT MED LIST    3. Type 2 diabetes mellitus with hyperglycemia, with long-term current use of insulin ([facility] [address]  Discussed importance of DM diet and benefits of exercise. Discussed proper use of medication. Stressed proper foot care and monitoring. Discussed the importance of yearly eye exams. 4. Essential hypertension  Discussed continued monitoring of her blood pressure and the possibility of need for blood pressure medication adjustments moving forward. 5. Mixed hyperlipidemia  Tolerating Lipitor. Will continue continue to monitor.     6. Cigarette nicotine dependence in remission  Stressed the importance of continuing to refrain from tobacco use. We will continue to monitor and assist as needed.         Medical Decision Making: high complexity

## 2021-02-22 NOTE — PAYOR COMM NOTE
"ADMIT INPT 2-19-21  UR  [id number]    PLEASE NOTE:  NOT A LATE NOTIFICATION DUE TO WEEKEND      Yolanda Mendenhall (65 y.o. Female)     Date of Birth Social Security Number Address Home Phone MRN    1955  [address]  [place] [address] [phone] [id number]    P[employer]ism Marital Status          Non-Spiritism        Admission Date Admission Type Admitting Provider Attending Provider Department, Room/Bed    2/19/21 Urgent Abhinav Rojas, [facility] 3C, 384/1    Discharge Date Discharge Disposition Discharge Destination        2/21/2021 Home or Self Care              Attending Provider: (none)   Allergies: Ceftin [Cefuroxime Axetil], Codeine, Contrast Dye, Iodinated Diagnostic Agents    Isolation: None   Infection: None   Code Status: Prior    Ht: 157.5 cm (62\")   Wt: 73.3 kg (161 lb 11.2 oz)    Admission Cmt: None   Principal Problem: Acute parotitis [K11.21]                 Active Insurance as of 2/19/2021     Primary Coverage     Payor Plan Insurance Group Employer/Plan Group    [facility] WELLCARE MEDICARE REPLACEMENT      Payor Plan Address Payor Plan Phone Number Payor Plan Fax Number Effective Dates    [address] [address] [phone]  2/1/2021 - None Entered    [facility] [id number]       Subscriber Name Subscriber Birth Date Member ID       YOLANDA MENDENHALL 1955 [id number]           Secondary Coverage     Payor Plan Insurance Group Employer/Plan Group    [place]      Payor Plan Address Payor Plan Phone Number Payor Plan Fax Number Effective Dates    [address] [phone]  6/14/2017 - None Entered    [facility] [id number]       Subscriber Name Subscriber Birth Date Member ID       YOLANDA MENDENHALL 1955 [id number]                 Emergency Contacts      (Rel.) Home Phone Work Phone Mobile Phone    JOSE LUIS ALBARRAN (Significant Other) [phone] -- [phone]    Jorge Alberto Argueta (Son) " [phone] -- --    Cortes Argueta (Son) [phone] -- --               History & Physical      Cristin Vasquez APRN at 02/19/21 9005     Attestation signed by Tiburcio Phillips MD at 02/20/21 5020    I personally evaluated and examined the patient in conjunction with Cristin Vasquez APRN and agree with the assessment, treatment plan, and disposition of the patient as recorded by her. My history, exam, and further recommendations are:     Patient complains of pain and swelling over right upper neck, she states has had a cyst previously surgically removed on that area. During surgery she had neurological complications that left her with weakness on right hand.     Empiric antibiotics and pain control  ENT evaluation in AM for further recommendations    Electronically signed by Tiburcio Phillips MD, 2/20/2021, 07:06 CST.                        [facility] Medicine Services  HISTORY AND PHYSICAL    Date of Admission: 2/19/2021  Primary Care Physician: Ramana Lynn MD    Subjective     Chief Complaint: Swelling right neck with severe pain    History of Present Illness  Yolanda Jacobs is a 65-year-old female with a past medical history of chronic pain syndrome followed by local pain management, hypothyroidism, anxiety and depression, insulin-dependent diabetes, COPD with ongoing occasional tobacco use, hypertension, hyperlipidemia, strokes x3 with left-sided residual weakness.  Patient presented to [facility] emergency department with complaints of neck pain and swelling.  She states she initially noticed Wednesday night before she went to bed.  She was awakened with pain and noticed the swelling.  She denies fever or hearing loss.  She called her PCP who could not see her until next week therefore she went to [facility] emergency department.  Patient received Rocephin and vancomycin, blood cultures were drawn.  She was transferred to [facility]  admit for ENT consultation.  Currently she is stable.  No other complaints.    Patient states in 2018 on October 3 she underwent surgery to remove 2 cyst in the same area and during surgery she did have a stroke.  Surgery was per Dr. Peguero.     Review of Systems   A 10 point review of systems was completed, all negative except for those discussed in HPI    Past Medical History:   Past Medical History:   Diagnosis Date   • COPD (chronic obstructive pulmonary disease) (CMS/HCC)    • Diabetes mellitus (CMS/HCC)    • HTN (hypertension)    • Hyperlipidemia    Anxiety and depression  Arthritis  Irritable bowel syndrome  Neuropathy  Pericardial effusion  Peripheral vascular disease    Past Surgical History:   Past Surgical History:   Procedure Laterality Date   • APPENDECTOMY     • CHOLECYSTECTOMY     • HYSTERECTOMY     • KNEE SURGERY     Colonoscopy  Endoscopy  Finger surgery    Family History: family history includes Cancer in her brother and mother; Diabetes in her mother; Lung cancer in her father; No Known Problems in her sister.    Social History:  reports that she has been smoking. She has been smoking about 0.50 packs per day. She has never used smokeless tobacco. She reports that she does not drink alcohol or use drugs.    Code Status: Full, if unable speak for herself her son Juan Argueta or friend Aly Plunkett will speak for her      Allergies:  Allergies   Allergen Reactions   • Ceftin [Cefuroxime Axetil]    • Codeine Nausea And Vomiting   • Contrast Dye Swelling   • Iodinated Diagnostic Agents        Medications:  No current facility-administered medications on file prior to encounter.      Current Outpatient Medications on File Prior to Encounter   Medication Sig Dispense Refill   • cetirizine (zyrTEC) 10 MG tablet Take 10 mg by mouth Daily.     • escitalopram (LEXAPRO) 20 MG tablet Take 20 mg by mouth Daily.     • HYDROcodone-acetaminophen (NORCO)  MG per tablet Take 1 tablet by mouth Every 8 (Eight)  Hours As Needed for Moderate Pain .     • tiotropium (SPIRIVA) 18 MCG per inhalation capsule Place 1 capsule into inhaler and inhale Daily.     • albuterol (ACCUNEB) 0.63 MG/3ML nebulizer solution Take 1 ampule by nebulization every 6 hours as needed for Wheezing     • Alcohol Swabs (ALCOHOL WIPES) pads Use 4 x daily 120 each 11   • ALPRAZolam (XANAX) 1 MG tablet Take 1 mg by mouth 3 (Three) Times a Day As Needed for Anxiety.     • amitriptyline (ELAVIL) 10 MG tablet Take 10 mg by mouth Every Night.     • aspirin 81 MG tablet Take 1 tablet by mouth Daily. 30 tablet 11   • atorvastatin (LIPITOR) 40 MG tablet Take 40 mg by mouth Daily.     • Baclofen 2 % cream Apply  topically 3 (Three) Times a Day.     • Blood Glucose Monitoring Suppl w/Device kit USE AS INDICATED, ANY MONITOR 1 each 1   • calcipotriene (DOVONEX) 0.005 % cream Apply  topically to the appropriate area as directed 2 (Two) Times a Day.     • cyclobenzaprine (FLEXERIL) 10 MG tablet Take 10 mg by mouth 3 (Three) Times a Day As Needed for Muscle Spasms.     • diltiaZEM CD (CARDIZEM CD) 180 MG 24 hr capsule Take 180 mg by mouth Daily.     • esomeprazole (nexIUM) 40 MG capsule Take 40 mg by mouth Every Morning Before Breakfast.     • fluconazole (DIFLUCAN) 150 MG tablet Take 1 tablet by mouth 1 (One) Time Per Week. 4 tablet 6   • fluticasone-salmeterol (ADVAIR) 250-50 MCG/DOSE DISKUS Inhale 1 puff 2 (Two) Times a Day.     • furosemide (LASIX) 40 MG tablet Take 40 mg by mouth 2 (Two) Times a Day.     • gabapentin (NEURONTIN) 400 MG capsule 2 capsules TID (Patient taking differently: 400 mg 3 (Three) Times a Day. 2 capsules TID) 180 capsule 5   • Gabapentin 10 % cream Apply 6 % topically 3 (Three) Times a Day.     • Glucose Blood (BLOOD GLUCOSE TEST) strip Use 4 x daily, use any brand covered by insurance or same brand as before 120 each 11   • insulin aspart (NOVOLOG FLEXPEN) 100 UNIT/ML solution pen-injector sc pen Up to 5-40 units with meals 12 pen 11   •  "Insulin Pen Needle (B-D UF III MINI PEN NEEDLES) 31G X 5 MM misc Use 4 times daily 120 each 11   • Lancet Devices (LANCING DEVICE) misc USE AS INDICATED TO CORRELATE WITH STRIPS AND METER 1 each 1   • Lancets 30G misc USE 4 X DAILY 120 each 11   • levothyroxine (SYNTHROID, LEVOTHROID) 25 MCG tablet Take 25 mcg by mouth Daily.     • Lidocaine HCl 2.5 % gel Apply  topically 3 (Three) Times a Day.     • lisinopril (PRINIVIL,ZESTRIL) 20 MG tablet Take 40 mg by mouth Daily.     • loratadine (CLARITIN) 10 MG tablet Take 10 mg by mouth Daily.     • metFORMIN ER (GLUCOPHAGE XR) 500 MG 24 hr tablet 2 tabs twice daily with meals , generic ok 120 tablet 11   • sulfamethoxazole-trimethoprim (BACTRIM,SEPTRA) 400-80 MG tablet Take 1 tablet by mouth 2 (Two) Times a Day. 20 tablet 0   • Tresiba FlexTouch 200 UNIT/ML solution pen-injector pen injection INJECT 70 UNITS UNDER SKIN AS DIRECTED EVERY NIGHT; UP  UNITS DAILY 18 mL 11   • Trulicity 0.75 MG/0.5ML solution pen-injector INJECT THE CONTENTS OF 1 PEN UNDER THE SKIN ON THE SAME DAY EACH WEEK. 2 mL 4   • varenicline (CHANTIX) 0.5 MG tablet Take 0.5 mg by mouth 2 (Two) Times a Day.           Objective     /64 (BP Location: Left arm, Patient Position: Lying)   Pulse 80   Temp 98.1 °F (36.7 °C) (Oral)   Resp 18   Ht 157.5 cm (62\")   Wt 73.3 kg (161 lb 11.2 oz)   SpO2 96%   BMI 29.58 kg/m²   Physical Exam  Vitals signs reviewed.   Constitutional:       Appearance: Normal appearance.   HENT:      Head: Normocephalic and atraumatic.      Mouth/Throat:      Mouth: Mucous membranes are moist.      Pharynx: Oropharynx is clear.   Eyes:      Extraocular Movements: Extraocular movements intact.      Pupils: Pupils are equal, round, and reactive to light.   Neck:      Musculoskeletal: Neck supple.      Comments: Range of motion difficult due to pain  Cardiovascular:      Rate and Rhythm: Normal rate and regular rhythm.      Heart sounds: Normal heart sounds. No murmur. "   Pulmonary:      Effort: Pulmonary effort is normal.      Breath sounds: Normal breath sounds.   Abdominal:      General: Bowel sounds are normal.      Palpations: Abdomen is soft.   Musculoskeletal: Normal range of motion.         General: No swelling.   Lymphadenopathy:      Head:      Right side of head: Posterior auricular (larger than left) adenopathy present.      Left side of head: Posterior auricular adenopathy present.      Cervical: No cervical adenopathy.   Skin:     General: Skin is warm and dry.   Neurological:      General: No focal deficit present.      Mental Status: She is alert and oriented to person, place, and time.   Psychiatric:         Mood and Affect: Mood normal.         Behavior: Behavior normal.           Pertinent Data:   Lab Results (last 72 hours)     Procedure Component Value Units Date/Time    POC Glucose Once [[id number]]  (Abnormal) Collected: 02/19/21 2257    Specimen: Blood Updated: 02/19/21 2333     Glucose 332 mg/dL      Comment: : [id number] Alec HenryaMeter ID: [id number]             Imaging Results (Last 24 Hours)     ** No results found for the last 24 hours. **        Obtained at [facility] emergency department today    Glucose 443, sodium 124 (corrected sodium 132), potassium 4.1, CO2 27, chloride 89, anion gap 8, creatinine 0.9, BUN 15, GFR greater than 60, alkaline phosphatase 136, total protein 6.3, ALT 12, AST 14, total bilirubin less than 0.2, albumin 3.2    WBC 9.7, hemoglobin 11.7, hematocrit 35.2, MCV 88, platelet count 193,000    Lactic acid 2, Covid 19 negative    CT of the neck and Maxillofacial without contrast    Result Impression   Impression: Bilateral lateral neck masses with inflammatory changes  associated with the right neck mass. 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  Multiplicity supports Warthin tumors. However, lymphadenopathy whether infectious or neoplastic process are in the  differential as well.    Signed by Dr Sarina Pompa on 2/19/2021 8:07 PM   Result Narrative   Exam: CT Maxillofacial without contrast.  CT face without contrast  Date: 2/19/2021   Indication: 65 years-year-old Female with right face swelling.  Comparison: None.  Technique: A series of 2.5  mm transaxial tomographic images are  obtained from the inferior aspect of the mandible through the frontal  sinuses. Coronal reformats are performed.  Radiation dose equals  mGy-cm.  Automated exposure control dose  reduction technique was implemented.  Findings:   CT neck and CT face without contrast: This is a limited examination  due to lack of intravenous contrast. Within those limitations: There  is a 1.8 cm right neck hyperdense mass with central hypodense  character. Adjacent fluid and fat stranding. It appears inseparable  from the parotid tail. A similar lesion without inflammatory changes  present on the left, measuring 2.2 x 1.4 cm.  There is no otherwise pathologically enlarged lymph node in the neck.  The visualized intracranial structures, oral cavity, nasopharynx,  retropharynx, larynx demonstrate no discrete mass or fluid collection.  The visualized lung apices are unremarkable.  There is atherosclerotic disease.  There is no acute osseous pathology.   Other Result Information   Francesco, Kyin Incoming Radiology Results From [facility] - 02/19/2021  7:09 PM CST  Exam: CT Maxillofacial without contrast.  CT face without contrast  Date: 2/19/2021   Indication: 65 years-year-old Female with right face swelling.  Comparison: None.  Technique: A series of 2.5  mm transaxial tomographic images are  obtained from the inferior aspect of the mandible through the frontal  sinuses. Coronal reformats are performed.  Radiation dose equals  mGy-cm.  Automated exposure control dose  reduction technique was implemented.  Findings:   CT neck and CT face without contrast: This is a limited examination  due to lack of  intravenous contrast. Within those limitations: There  is a 1.8 cm right neck hyperdense mass with central hypodense  character. Adjacent fluid and fat stranding. It appears inseparable  from the parotid tail. A similar lesion without inflammatory changes  present on the left, measuring 2.2 x 1.4 cm.  There is no otherwise pathologically enlarged lymph node in the neck.  The visualized intracranial structures, oral cavity, nasopharynx,  retropharynx, larynx demonstrate no discrete mass or fluid collection.  The visualized lung apices are unremarkable.  There is atherosclerotic disease.  There is no acute osseous pathology.    IMPRESSION:  Impression: Bilateral lateral neck masses with inflammatory changes  associated with the right neck mass. 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  Multiplicity supports Warthin tumors. However, lymphadenopathy whether infectious or neoplastic process are in the differential as well..  Signed by Dr Sarina Pompa on 2/19/2021 8:07 PM       Assessment / Plan     Assessment:   Active Hospital Problems    Diagnosis   • Mass of both parotid glands   • Hyponatremia   • Acute pain   • Uncontrolled type 2 diabetes mellitus, without long-term current use of insulin (CMS/[facility])       Plan:   1.  Admit as inpatient  2.  Consult ENT  3.  N.p.o. after midnight for possible invasive procedure tomorrow  4.  Normal saline 100 mL/hour  5.  DVT prophylaxis with Lovenox  6.  Monitor glucose before meals and at bedtime with regular insulin sliding scale coverage  7.  Home medication reviewed and restarted as appropriate  8.  Pain management  9.  Labs in a.m.  10.  Patient received Rocephin and vancomycin at outlying facility, will hold further treatment until recommendation received by ENT    I discussed the patient's findings and my recommendations with: Tiburcio Phillips MD  Time spent: 35 minutes    Patient seen and examined by me on 2/19/2021  at 10:47 PM.    Electronically signed by SIMONA Harrison, 02/19/21, 23:46 CST.    Electronically signed by Tiburcio Phillips MD at 02/20/21 0708       Emergency Department Notes    No notes of this type exist for this encounter.           Facility-Administered Medications as of 2/21/2021   Medication Dose Route Frequency Provider Last Rate Last Admin   • [COMPLETED] clindamycin (CLEOCIN) 900 mg in dextrose 5% 50 mL IVPB (premix)  900 mg Intravenous Q8H Jigar Iglesias MD 50 mL/hr at 02/21/21 0838 900 mg at 02/21/21 0838   • [COMPLETED] insulin regular (humuLIN R,novoLIN R) injection 10 Units  10 Units Subcutaneous Once Cristin Vasquez APRN   10 Units at 02/20/21 0007     Orders (last 7 days)      Start     Ordered    02/21/21 1615  clindamycin (CLEOCIN) capsule 300 mg  Every 8 Hours Scheduled,   Status:  Discontinued      02/21/21 1521    02/21/21 1522  Obtain Informed Consent  Once,   Status:  Canceled      02/21/21 1521    02/21/21 1522  Non-gynecologic Cytology  Once      02/21/21 1521    02/21/21 1522  Body Fluid Culture - Body Fluid, Parotid  Once      02/21/21 1521    02/21/21 1522  Fine Needle Aspiration  Once      02/21/21 1521    02/21/21 1256  Discontinue IV  Once,   Status:  Canceled      02/21/21 1256    02/21/21 1255  Discharge patient  Once      02/21/21 1256    02/21/21 1152  POC Glucose Once  Once      02/21/21 1139    02/21/21 0849  POC Glucose Once  Once      02/21/21 0827    02/21/21 0600  CBC Auto Differential  PROCEDURE ONCE      02/21/21 0001    02/21/21 0000  clindamycin (Cleocin) 300 MG capsule  3 Times Daily      02/21/21 1256    02/21/21 0000  Discharge Follow-up with PCP      02/21/21 1256    02/21/21 0000  Discharge Follow-up with Specified Provider: Dr Iglesias; 2 Weeks      02/21/21 1256    02/21/21 0000  Activity as Tolerated      02/21/21 1256    02/21/21 0000  Diet: Regular; Thin      02/21/21 1256    02/20/21 2050  POC Glucose Once  Once      02/20/21 2037     02/20/21 1735  POC Glucose Once  Once      02/20/21 1713    02/20/21 1127  POC Glucose Once  Once      02/20/21 1114    02/20/21 1030  clindamycin (CLEOCIN) 900 mg in dextrose 5% 50 mL IVPB (premix)  Every 8 Hours      02/20/21 0943    02/20/21 0926  US Guided Salivary Gland Biopsy  1 Time Imaging,   Status:  Canceled      02/20/21 0927    02/20/21 0922  Diet Regular; Cardiac, Consistent Carbohydrate  Diet Effective Now,   Status:  Canceled      02/20/21 0921    02/20/21 0808  POC Glucose Once  Once      02/20/21 0756    02/20/21 0800  insulin lispro (humaLOG) injection 0-14 Units  4 Times Daily With Meals & Nightly,   Status:  Discontinued      02/19/21 2343    02/20/21 0730  insulin lispro (humaLOG) injection 0-14 Units  3 Times Daily Before Meals,   Status:  Discontinued      02/19/21 2248 02/20/21 0702  Inpatient ENT Consult  IN      Specialty:  Otolaryngology  Provider:  Jigar Iglesias MD    02/20/21 0332    02/20/21 0700  POC Glucose 4x Daily AC & at Bedtime  4 Times Daily Before Meals & at Bedtime,   Status:  Canceled     Comments: If bedtime blood glucose is greater than 350 mg/dl, call MD.      02/19/21 2248    02/20/21 0600  CBC Auto Differential  PROCEDURE ONCE      02/20/21 0002    02/20/21 0332  Opioid Administration - Continuous Pulse Oximetry (SpO2) Monitoring  Per Order Details,   Status:  Canceled      02/20/21 0331    02/20/21 0332  Opioid Administration - Document SpO2 Value With Each Set of Vitals & Any Change in Patient Status  Per Order Details,   Status:  Canceled      02/20/21 0331    02/20/21 0332  Opioid Administration - Notify Provider Pulse Oximetry (SpO2)  Until Discontinued,   Status:  Canceled      02/20/21 0331    02/20/21 0045  insulin regular (humuLIN R,novoLIN R) injection 10 Units  Once      02/19/21 2355    02/20/21 0001  NPO Diet  Diet Effective Midnight,   Status:  Canceled      02/19/21 2344    02/20/21 0000  Vital Signs  Every 4 Hours,   Status:  Canceled       02/19/21 2248 02/19/21 2345  sodium chloride 0.9 % flush 10 mL  Every 12 Hours Scheduled,   Status:  Discontinued      02/19/21 2248 02/19/21 2345  sodium chloride 0.9 % infusion  Continuous,   Status:  Discontinued      02/19/21 2248 02/19/21 2334  POC Glucose Once  Once      02/19/21 2257 02/19/21 2331  COVID PRE-OP / PRE-PROCEDURE SCREENING ORDER (NO ISOLATION) - Swab, Nasopharynx  Once,   Status:  Canceled      02/19/21 2330 02/19/21 2331  COVID-19,[employer] IN-HOUSE,Nasal Swab No Transport Media 3-4 HR TAT - Swab,  PROCEDURE ONCE,   Status:  Canceled      02/19/21 2332 02/19/21 2300  enoxaparin (LOVENOX) syringe 40 mg  Nightly,   Status:  Discontinued      02/19/21 2248 02/19/21 2249  Basic Metabolic Panel  Daily,   Status:  Canceled      02/19/21 2248 02/19/21 2249  CBC & Differential  Daily,   Status:  Canceled      02/19/21 2248 02/19/21 2249  CBC Auto Differential  PROCEDURE ONCE      02/19/21 2248 02/19/21 2248  ondansetron (ZOFRAN) injection 4 mg  Every 6 Hours PRN,   Status:  Discontinued      02/19/21 2248 02/19/21 2248  HYDROmorphone (DILAUDID) injection 1 mg  Every 2 Hours PRN,   Status:  Discontinued      02/19/21 2248 02/19/21 2248  naloxone (NARCAN) injection 0.4 mg  Every 5 Minutes PRN,   Status:  Discontinued      02/19/21 2248 02/19/21 2248  HYDROcodone-acetaminophen (NORCO)  MG per tablet 1 tablet  Every 4 Hours PRN,   Status:  Discontinued      02/19/21 2248 02/19/21 2247  acetaminophen (TYLENOL) tablet 650 mg  Every 4 Hours PRN,   Status:  Discontinued      02/19/21 2248 02/19/21 2247  Code Status and Medical Interventions:  Continuous,   Status:  Canceled      02/19/21 2248 02/19/21 2247  Intake & Output  Every Shift,   Status:  Canceled      02/19/21 2248 02/19/21 2247  Weigh Patient  Once,   Status:  Canceled      02/19/21 2248 02/19/21 2247  Oxygen Therapy- Nasal Cannula; Titrate for SPO2: 90% - 95%  Continuous,   Status:   Canceled      02/19/21 2248 02/19/21 2247  Insert Peripheral IV  Once,   Status:  Canceled      02/19/21 2248 02/19/21 2247  Saline Lock & Maintain IV Access  Continuous,   Status:  Canceled      02/19/21 2248 02/19/21 2247  Do NOT Hold Basal or Correction Scale Insulin When Patient is NPO, Hold Scheduled Mealtime (Bolus) Insulin if NPO  Continuous,   Status:  Canceled      02/19/21 2248 02/19/21 2247  Follow [facility] Hypoglycemia Standing Orders For Blood Glucose Less Than 70 mg/dL  Until Discontinued,   Status:  Canceled     Comments: ALERT PATIENT - NOT NPO & CAN SAFELY SWALLOW  Administer 4 oz Fruit Juice OR 4 oz Regular Soda OR 8 oz Milk OR 15-30 grams (1 tube) of Glucose Gel.  Recheck Blood Glucose Approximately 15 Minutes After Ingestion, Repeat Treatment & Continue to Recheck Blood Sugar Approximately Every 15 Minutes Until Blood Glucose is 70 or Higher.  Once Blood Glucose is 70 or Higher & if It Will Be More Than 60 Minutes Until Next Meal, Provide Appropriate Snack (Including Carbohydrate Food) Based on Meal Plan Order. Give Meal Tray As Soon As Possible.    PATIENT HAS IV ACCESS - UNRESPONSIVE, NPO OR UNABLE TO SAFELY SWALLOW  Administer 25g (50ml) D50W IV Push.  Recheck Blood Glucose Approximately 15 Minutes After Administration, if Blood Glucose Remains Less Than 70, Repeat Treatment   Recheck Blood Glucose Approximately 15 Minutes After 2nd Administration, if Blood Glucose Remains Less Than 70 After 2nd Dose of D50W, Contact Provider for Further Treatment Orders & Consider Adding IVF With D5W for Maintenance    PATIENT WITHOUT IV ACCESS - UNRESPONSIVE, NPO OR UNABLE TO SAFELY SWALLOW  Administer 1mg Glucagon SQ & Establish IV Access.  Turn Patient on Side - Nausea / Vomiting May Occur.  Recheck Blood Glucose Approximately 15 Minutes After Administration.  If Blood Glucose Remains Less Than 70, Administer 25g D50W IV Push (50ml).  Recheck Blood Glucose Approximately 15 Minutes After  Administration of D50W, if Blood Glucose Remains Less Than 70, Contact Provider for Further Treatment Orders & Consider Adding IVF With D5 for Maintenance    Document Event & Patient Response to Interventions in EMR, Document Medications on MAR  Notify Provider if Hypoglycemia Treatment Needed    21  Inpatient Admission  Once      21  Activity - Ad Michelle  Until Discontinued,   Status:  Canceled      21  Diet Regular; Consistent Carbohydrate  Diet Effective Now,   Status:  Canceled      21  dextrose (GLUTOSE) oral gel 15 g  Every 15 Minutes PRN,   Status:  Discontinued      21  dextrose (D50W) 25 g/ 50mL Intravenous Solution 25 g  Every 15 Minutes PRN,   Status:  Discontinued      21  glucagon (human recombinant) (GLUCAGEN DIAGNOSTIC) injection 1 mg  Every 15 Minutes PRN,   Status:  Discontinued      21  sodium chloride 0.9 % flush 10 mL  As Needed,   Status:  Discontinued      21    --  cetirizine (zyrTEC) 10 MG tablet  Daily      21    --  escitalopram (LEXAPRO) 20 MG tablet  Daily      21    --  tiotropium (SPIRIVA) 18 MCG per inhalation capsule  Daily - RT      21    --  HYDROcodone-acetaminophen (NORCO)  MG per tablet  Every 8 Hours PRN      21                   Physician Progress Notes (last 7 days) (Notes from 21 through 21)      Jigar Iglesias MD at 21 0944             [facility]   Progress Note    Patient Name: Yolanda Mendenhall  : 1955  MRN: [id number]  Primary Care Physician: Ramana Lynn MD  Date of admission: 2021    Subjective   Subjective     Chief Complaint: Right facial swelling    History of Present Illness  Patient Reports slight improvement from yesterday.  She has not had as much soreness and seems  to have slightly decreased amount of swelling.  There has been no fevers and her white count has been normal.      Review of Systems   Constitutional: Negative for chills, fatigue and fever.   HENT:        As per HPI   Gastrointestinal: Negative for nausea and vomiting.   Endocrine:        As per HPI       Objective   Objective     Vitals:  Temp:  [97.2 °F (36.2 °C)-99.1 °F (37.3 °C)] 97.8 °F (36.6 °C)  Heart Rate:  [74-90] 86  Resp:  [14-16] 16  BP: (147-160)/(54-66) 147/60    Physical Exam  Vitals signs reviewed.   Constitutional:       Appearance: Normal appearance. She is well-developed.   HENT:      Head: Normocephalic and atraumatic.      Jaw: Jaw tenderness: bhesig.      Comments: There continues to be right-sided facial swelling located at the right tail parotid region.  It is slightly less than yesterday.  It .  This is under a well-healed scar.     Right Ear: External ear normal.      Left Ear: External ear normal.      Nose: Nose normal.   Eyes:      Extraocular Movements: Extraocular movements intact.      Conjunctiva/sclera: Conjunctivae normal.   Neck:      Musculoskeletal: Normal range of motion.   Pulmonary:      Effort: Pulmonary effort is normal. No respiratory distress.      Breath sounds: No stridor.   Musculoskeletal: Normal range of motion.   Skin:     Findings: No rash.   Neurological:      General: No focal deficit present.      Mental Status: She is alert.      Cranial Nerves: No cranial nerve deficit.   Psychiatric:         Behavior: Behavior normal.         Thought Content: Thought content normal.         Judgment: Judgment normal.          Result Review    Result Review:  I have personally reviewed the results from the time of this admission to 02/21/21 9:44 AM CST and agree with these findings:  [x]  Laboratory- normal white count  []  Microbiology  []  Radiology  []  EKG/Telemetry   []  Cardiology/Vascular   []  Pathology  []  Old records  []  Other:  Most notable findings  include: Normal white count    Assessment/Plan   Assessment / Plan     Brief Patient Summary:  Yolanda Mendenhall is a 65 y.o. 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  She has had 3 previous surgeries.  She seems to be slightly improved with antibiotics but I do not feel this is a infection but rather most likely represents fluid accumulation from her [place]'s tumors.     Active Hospital Problems:  Active Hospital Problems    Diagnosis   • **Acute parotitis   • Mass of both parotid glands   • Hyponatremia   • Acute pain   • Type 2 diabetes mellitus with hyperglycemia, with long-term current use of insulin (CMS/HCC)   • Mixed diabetic hyperlipidemia associated with type 2 diabetes mellitus (CMS/HCC)   • Hypertension associated with diabetes (CMS/HCC)   • Diabetic polyneuropathy associated with type 2 diabetes mellitus (CMS/HCC)   • Uncontrolled type 2 diabetes mellitus, without long-term current use of insulin (CMS/HCC)       Plan:   I feel that she is able to be transitioned to oral clindamycin.  I have put in orders to have a fine-needle aspiration done under ultrasound guidance.  This can also be done as an outpatient.  I will have her set up to see us back in about 2 weeks to reevaluate.  We will need to get her old records to see what is actually been done and what the previous pathology was.  This will determine whether or not I feel that it is okay to proceed with surgery with me here in [place] versus referral to head neck surgeon.    Electronically signed by Jigar Iglesias MD, 02/21/21, 9:48 AM CST.               Electronically signed by Jigar Iglesias MD at 02/21/21 0948     Abhinav Rojas DO at 02/20/21 [facility]9              [facility] Medicine Services  INPATIENT PROGRESS NOTE    Length of Stay: 1  Date of Admission: 2/19/2021  Primary Care Physician: Ramana Lynn MD    Subjective     Chief Complaint:     Pain and  swelling right neck and jawline    HPI     The patient continues to complain of discomfort this morning.  Discomfort seems to be somewhat improved.  She is currently on IV clindamycin to cover for staph species.  She initially went to [facility] emergency department for evaluation as her primary care physician could not see her until next week.  She was subsequently transferred to our facility where she was admitted for further treatment and evaluation.  ENT has seen and evaluated the patient and feels as though the patient's parotid masses are consistent with Jael's tumors.  It is felt that the swelling on the right is more likely fluid collection from that recurrent tumor.  There is no concern for malignancy.  It is doubtful that there is an infectious component as the patient's white blood cell count is within normal limits and she has been afebrile.  ENT recommends treatment with IV antibiotics overnight with discharge tomorrow and possible outpatient ultrasound-guided needle biopsy to attempt to aspirate any accumulated fluids.  ENT notes that revision surgery may be required in the future but would be best done at a university setting given that this would be a third procedure on her parotid glands.    CBC this a.m. is unremarkable except hemoglobin 11.2.  BMP is unremarkable except sodium 131 and glucose 183.    Review of Systems     All pertinent negatives and positives are as above. All other systems have been reviewed and are negative unless otherwise stated.     Objective    Temp:  [97.6 °F (36.4 °C)-98.3 °F (36.8 °C)] 98.3 °F (36.8 °C)  Heart Rate:  [78-89] 88  Resp:  [14-18] 14  BP: (138-161)/(61-70) 149/66    Lab Results (last 24 hours)     Procedure Component Value Units Date/Time    POC Glucose Once [[id number]]  (Abnormal) Collected: 02/20/21 1114    Specimen: Blood Updated: 02/20/21 1126     Glucose 188 mg/dL      Comment: : [id number] Omar DeannaMeter ID: [id number]       POC Glucose Once  [[id number]]  (Abnormal) Collected: 02/20/21 0756    Specimen: Blood Updated: 02/20/21 0807     Glucose 187 mg/dL      Comment: : [id number] Jose Murray ID: [id number]       Basic Metabolic Panel [[id number]]  (Abnormal) Collected: 02/20/21 0400    Specimen: Blood Updated: 02/20/21 0457     Glucose 183 mg/dL      BUN 15 mg/dL      Creatinine 0.82 mg/dL      Sodium 131 mmol/L      Potassium 3.8 mmol/L      Chloride 94 mmol/L      CO2 29.0 mmol/L      Calcium 9.2 mg/dL      eGFR Non African Amer 70 mL/min/1.73      BUN/Creatinine Ratio 18.3     Anion Gap 8.0 mmol/L     Narrative:      GFR Normal >60  Chronic Kidney Disease <60  Kidney Failure <15      CBC & Differential [[id number]]  (Abnormal) Collected: 02/20/21 0400    Specimen: Blood Updated: 02/20/21 0450    Narrative:      The following orders were created for panel order CBC & Differential.  Procedure                               Abnormality         Status                     ---------                               -----------         ------                     CBC Auto Differential[[id number]]        Abnormal            Final result                 Please view results for these tests on the individual orders.    CBC Auto Differential [[id number]]  (Abnormal) Collected: 02/20/21 0400    Specimen: Blood Updated: 02/20/21 0450     WBC 7.78 10*3/mm3      RBC 3.75 10*6/mm3      Hemoglobin 11.2 g/dL      Hematocrit 31.8 %      MCV 84.8 fL      MCH 29.9 pg      MCHC 35.2 g/dL      RDW 12.6 %      RDW-SD 38.5 fl      MPV 11.9 fL      Platelets 171 10*3/mm3      Neutrophil % 60.4 %      Lymphocyte % 28.1 %      Monocyte % 9.6 %      Eosinophil % 1.0 %      Basophil % 0.4 %      Immature Grans % 0.5 %      Neutrophils, Absolute 4.69 10*3/mm3      Lymphocytes, Absolute 2.19 10*3/mm3      Monocytes, Absolute 0.75 10*3/mm3      Eosinophils, Absolute 0.08 10*3/mm3      Basophils, Absolute 0.03 10*3/mm3      Immature Grans, Absolute 0.04 10*3/mm3      nRBC 0.0  /100 WBC     Basic Metabolic Panel [[id number]]  (Abnormal) Collected: 02/19/21 2306    Specimen: Blood Updated: 02/20/21 0022     Glucose 343 mg/dL      BUN 15 mg/dL      Creatinine 0.79 mg/dL      Sodium 128 mmol/L      Potassium 4.4 mmol/L      Chloride 91 mmol/L      CO2 29.0 mmol/L      Calcium 9.4 mg/dL      eGFR Non African Amer 74 mL/min/1.73      BUN/Creatinine Ratio 19.0     Anion Gap 8.0 mmol/L     Narrative:      GFR Normal >60  Chronic Kidney Disease <60  Kidney Failure <15      CBC & Differential [[id number]]  (Abnormal) Collected: 02/19/21 2306    Specimen: Blood Updated: 02/20/21 0005    Narrative:      The following orders were created for panel order CBC & Differential.  Procedure                               Abnormality         Status                     ---------                               -----------         ------                     CBC Auto Differential[[id number]]        Abnormal            Final result                 Please view results for these tests on the individual orders.    CBC Auto Differential [[id number]]  (Abnormal) Collected: 02/19/21 2306    Specimen: Blood Updated: 02/20/21 0005     WBC 8.84 10*3/mm3      RBC 3.93 10*6/mm3      Hemoglobin 11.9 g/dL      Hematocrit 33.1 %      MCV 84.2 fL      MCH 30.3 pg      MCHC 36.0 g/dL      RDW 12.5 %      RDW-SD 38.4 fl      MPV 12.3 fL      Platelets 191 10*3/mm3      Neutrophil % 62.3 %      Lymphocyte % 26.9 %      Monocyte % 9.4 %      Eosinophil % 0.8 %      Basophil % 0.3 %      Immature Grans % 0.3 %      Neutrophils, Absolute 5.50 10*3/mm3      Lymphocytes, Absolute 2.38 10*3/mm3      Monocytes, Absolute 0.83 10*3/mm3      Eosinophils, Absolute 0.07 10*3/mm3      Basophils, Absolute 0.03 10*3/mm3      Immature Grans, Absolute 0.03 10*3/mm3      nRBC 0.0 /100 WBC     POC Glucose Once [[id number]]  (Abnormal) Collected: 02/19/21 2257    Specimen: Blood Updated: 02/19/21 2333     Glucose 332 mg/dL      Comment: : [id number]  Alec Herrera ID: [id number]             Imaging Results (Last 24 Hours)     ** No results found for the last 24 hours. **             Intake/Output Summary (Last 24 hours) at 2/20/2021 1349  Last data filed at 2/20/2021 1147  Gross per 24 hour   Intake --   Output 0 ml   Net 0 ml       Physical Exam  Constitutional:       Appearance: Normal appearance.   HENT:      Head: Normocephalic and atraumatic.      Mouth/Throat:      Mouth: Mucous membranes are moist.      Pharynx: Oropharynx is clear.   Eyes:      Extraocular Movements: Extraocular movements intact.      Pupils: Pupils are equal, round, and reactive to light.   Neck:      Musculoskeletal: Neck supple.      Comments: Range of motion difficult due to pain.  Parotid glands enlarged bilaterally.  Right parotid gland is tender to touch.  Cardiovascular:      Rate and Rhythm: Normal rate and regular rhythm.      Heart sounds: Normal heart sounds. No murmur.   Pulmonary:      Effort: Pulmonary effort is normal.      Breath sounds: Normal breath sounds.   Abdominal:      General: Bowel sounds are normal.      Palpations: Abdomen is soft.   Musculoskeletal: Normal range of motion.         General: No swelling.   Lymphadenopathy:   Cervical: No cervical adenopathy.   Skin:     General: Skin is warm and dry.   Neurological:      General: No focal deficit present.      Mental Status: She is alert and oriented to person, place, and time.   Psychiatric:         Mood and Affect: Mood normal.         Behavior: Behavior normal.    Results Review:  I have reviewed the labs, radiology results, and diagnostic studies since my last progress note and made treatment changes reflective of the results.   I have reviewed the current medications.    Assessment/Plan     Active Hospital Problems    Diagnosis   • **Acute parotitis   • Mass of both parotid glands   • Hyponatremia   • Acute pain   • Type 2 diabetes mellitus with hyperglycemia, with long-term current use of insulin  (CMS/HCC)   • Mixed diabetic hyperlipidemia associated with type 2 diabetes mellitus (CMS/HCC)   • Hypertension associated with diabetes (CMS/HCC)   • Diabetic polyneuropathy associated with type 2 diabetes mellitus (CMS/HCC)   • Uncontrolled type 2 diabetes mellitus, without long-term current use of insulin (CMS/HCC)       PLAN:  Continue clindamycin IV  Possible discharge tomorrow on oral clindamycin to follow-up with ENT on an outpatient basis    Electronically signed by Abhinav Rojas DO, 21, 13:49 CST.      Electronically signed by Abhinav Rojas DO at 21 1357          Consult Notes (last 7 days) (Notes from 21 through 21)      Jigar Iglesias MD at 21 0836      Consult Orders    1. Inpatient ENT Consult [[id number]] ordered by Tiburcio Phillips MD                 [facility]   Consult Note    Patient Name: Yolanda Jacobs  : 1955  MRN: [id number]  Primary Care Physician: Ramana Lynn MD  Referring Physician: Tiburcio Phillips,*  Date of admission: 2021    Inpatient ENT Consult  Consult performed by: Jigar Iglesias MD  Consult ordered by: Tiburcio Phillips MD        Subjective   Subjective     Reason for Consult/ Chief Complaint: parotid swelling and masses    Yolanda Jacobs is a 65 y.o. female with a history of chronic pain syndrome followed by local pain management, hypothyroidism, anxiety and depression, insulin-dependent diabetes, COPD with ongoing occasional tobacco use, hypertension, hyperlipidemia, strokes x3 with left-sided residual weakness, who presented to the [facility] emergency room last night with swelling of the neck.  Work-up in the emergency room showed bilateral parotid masses and some inflammatory changes within the left parotid gland around another lesion.  She has had a problem with chronic pain in the past and reports the right parotid has been very tender.  She has not had fevers.  Her white count was  mildly elevated.  She had a very elevated glucose in the emergency room.  She has had 3 surgeries to the right inferior parotid gland in the past.  Two were by  and one was by another surgeon all in [facility].  She reports that she had perioperative strokes x3, 1 during the first procedure and 1 after the first procedure.  She  reports that she has been smoking. She has been smoking about 0.50 packs per day. She has never used smokeless tobacco.      Review of Systems   Constitutional: Negative for fever and unexpected weight change.   HENT: Negative for sore throat, trouble swallowing and voice change.         As per HPI   Eyes: Negative.    Respiratory: Negative.    Cardiovascular: Negative.    Gastrointestinal: Negative.    Musculoskeletal: Positive for arthralgias, back pain, myalgias and neck pain. Negative for neck stiffness.   Skin: Negative.    Neurological: Positive for weakness (Left-sided from her strokes). Negative for dizziness, facial asymmetry and speech difficulty.   Hematological: Negative for adenopathy.        Personal History     Past Medical History:   Diagnosis Date   • COPD (chronic obstructive pulmonary disease) (CMS/HCC)    • Diabetes mellitus (CMS/HCC)    • HTN (hypertension)    • Hyperlipidemia        Past Surgical History:   Procedure Laterality Date   • APPENDECTOMY     • CHOLECYSTECTOMY     • HYSTERECTOMY     • KNEE SURGERY         Family History: family history includes Cancer in her brother and mother; Diabetes in her mother; Lung cancer in her father; No Known Problems in her sister. Otherwise pertinent FHx was reviewed and not pertinent to current issue.    Social History:  reports that she has been smoking. She has been smoking about 0.50 packs per day. She has never used smokeless tobacco. She reports that she does not drink alcohol or use drugs.    Home Medications:  ALPRAZolam, Alcohol Wipes, Baclofen, Blood Glucose Monitoring Suppl, Blood Glucose Test,  Dulaglutide, Gabapentin, HYDROcodone-acetaminophen, Insulin Degludec, Insulin Pen Needle, Lancets 30G, Lancing Device, Lidocaine HCl, albuterol, amitriptyline, aspirin, atorvastatin, calcipotriene, cetirizine, cyclobenzaprine, dilTIAZem CD, escitalopram, esomeprazole, fluconazole, fluticasone-salmeterol, furosemide, gabapentin, insulin aspart, levothyroxine, lisinopril, loratadine, metFORMIN ER, sulfamethoxazole-trimethoprim, tiotropium, and varenicline      Allergies:  Allergies   Allergen Reactions   • Ceftin [Cefuroxime Axetil]    • Codeine Nausea And Vomiting   • Contrast Dye Swelling   • Iodinated Diagnostic Agents        Objective    Objective   Vitals:  Temp:  [97.6 °F (36.4 °C)-98.1 °F (36.7 °C)] 98.1 °F (36.7 °C)  Heart Rate:  [78-89] 89  Resp:  [14-18] 14  BP: (138-161)/(61-70) 138/70    Physical Exam  Vitals signs reviewed.   Constitutional:       Appearance: Normal appearance. She is well-developed.   HENT:      Head: Normocephalic and atraumatic.      Jaw: There is normal jaw occlusion. No trismus.      Salivary Glands: Right salivary gland is tender. Left salivary gland is not tender.        Comments: The right inferior parotid area is tender to palpation.  There is fullness in the lower half of the right parotid just underneath a well-healed scar.  The scar extends to just below the earlobe but does not appear to have extended in the preauricular area.  There is only minimal erythema.  I did not get the sense that there is a cellulitis.  On the left, there is a well-rounded cystic area approximately 2 cm at the angle of the mandible.     Right Ear: Hearing and external ear normal.      Left Ear: Hearing and external ear normal.      Nose: Nose normal.      Mouth/Throat:      Mouth: Mucous membranes are dry.   Eyes:      General: Lids are normal. Vision grossly intact.      Extraocular Movements: Extraocular movements intact.      Conjunctiva/sclera: Conjunctivae normal.   Neck:      Musculoskeletal:  "Normal range of motion.      Thyroid: No thyroid mass or thyroid tenderness.      Trachea: Trachea normal.      Comments: There is no parotid masses as previously described.  Pulmonary:      Effort: Pulmonary effort is normal. No respiratory distress.      Breath sounds: No stridor.   Musculoskeletal: Normal range of motion.   Lymphadenopathy:      Cervical: No cervical adenopathy.   Skin:     Findings: No rash.   Neurological:      General: No focal deficit present.      Mental Status: She is oriented to person, place, and time. She is lethargic.      Cranial Nerves: No cranial nerve deficit.      Comments: She was very lethargic through the examination and had periods where she closed her eyes and either fell asleep or stop sending.  The facial nerve is grossly intact on the right with only minimal right marginal mandibular weakness   Psychiatric:         Attention and Perception: She is inattentive.         Mood and Affect: Mood normal.         Behavior: Behavior is slowed.         Thought Content: Thought content normal.         Judgment: Judgment normal.      Comments: Reported that \"everything hurt\"         Result Review    Result Review:  I have personally reviewed the results from the time of this admission to 2/20/2021 08:36 CST and agree with these findings:  [x]  Laboratory  []  Microbiology  [x]  Radiology  []  EKG/Telemetry   []  Cardiology/Vascular   []  Pathology  [x]  Old records  []  Other:  Most notable findings include: Blood glucose was elevated to 443 it is now down to 187.  White blood cell count is 9.7 on admission with most recent down to 7.78 in Care Everywhere, there is a CT scan of the neck from November 26, 2018 that showed mild subcutaneous soft tissue prominence with probable skin thickening of the right lateral neck inferior to the mandible.  There also were prominent parotid gland soft tissue nodules with one measuring at least 1.7 cm in the left.  CT scan of the neck from February " "19, 2021 in the emergency room at [facility] showed bilateral neck masses with inflammatory changes within the right neck mass area.  These masses were within the parotid glands.      Assessment/Plan   Assessment / Plan     Brief Patient Summary:  Yolanda Jacobs is a 65 y.o. female who has had a history of 3 previous surgeries to the right parotid gland.  These were reportedly benign and she recognized the term \"W[place]in's tumor\".  She presented to the [facility] emergency room with swelling of the right parotid area with a minimally elevated white count which is gone down to normal, no fevers, but an elevated blood glucose level.  She has a history of strokes in the perioperative procedure after her parotid resection but is uncertain if any work-up has been done for these.  I have no old records to evaluate this.  I feel that her masses in the parotid gland and are consistent with Warthin's tumor and there is a possibility that the swelling that she has on the right-hand side is more representation of fluid collection from a recurrent Warthin's tumor.  I am not concerned about malignancy.  I am questioning whether there is truly an infectious component to this given the white blood cell count findings and the lack of fever.  Her examination and history is heavily clouded by her chronic pain.  Though she was tender in the right parotid area, everything that was touched was tender.  I do not doubt that there is soreness to the right parotid area but her chronic pain syndrome may magnify the normal responses to her current clinical situation.      Active Hospital Problems:  Active Hospital Problems    Diagnosis   • Mass of both parotid glands   • Hyponatremia   • Acute pain   • Uncontrolled type 2 diabetes mellitus, without long-term current use of insulin (CMS/[facility])       Plan:   I would go ahead and treat her with IV antibiotics to cover staph type species which would be common in a acute parotitis situation.  If she does not " improve, we can consider doing a ultrasound-guided needle biopsy to see if there is any fluid that can be aspirated both to reduce the tenderness but also to get pathologic tissue.  At some point in time, she may require revision surgery but given her previous history of perioperative strokes, I would need to get more information to make sure that this would be prevented.  Given the fact that this would be the third procedure on the parotid gland, she may benefit from referral to a [facility] head neck [facility].  However this would be considered only after her acute situation improves and can be evaluated on an outpatient basis.    Electronically signed by Jigar Iglesias MD, 02/20/21, 9:18 AM CST.      Electronically signed by Jigar Iglesias MD at 02/20/21 0920

## 2021-02-22 NOTE — OUTREACH NOTE
Prep Survey      Responses   [facility] facility patient discharged from?  [place]   Is LACE score < 7 ?  No   Emergency Room discharge w/ pulse ox?  No   Eligibility  Readm Mgmt   Discharge diagnosis  Acute parotitis, mass of both parotid glands   Does the patient have one of the following disease processes/diagnoses(primary or secondary)?  Other   Does the patient have Home health ordered?  No   Is there a DME ordered?  No   Comments regarding appointments  Needs f/u scheduled   Medication alerts for this patient  Per AVS   Prep survey completed?  Yes          Steph Gaitan RN

## 2021-02-23 ENCOUNTER — READMISSION MANAGEMENT (OUTPATIENT)
Dept: CALL CENTER | Facility: HOSPITAL | Age: 66
End: 2021-02-23

## 2021-02-23 NOTE — OUTREACH NOTE
Medical Week 1 Survey      Responses   [facility] patient discharged from?  [place]   Does the patient have one of the following disease processes/diagnoses(primary or secondary)?  Other   Week 1 attempt successful?  Yes   Call start time  1400   Call end time  1416   Discharge diagnosis  Acute parotitis, mass of both parotid glands   Meds reviewed with patient/caregiver?  Yes   Is the patient having any side effects they believe may be caused by any medication additions or changes?  No   Does the patient have all medications ordered at discharge?  Yes   Is the patient taking all medications as directed (includes completed medication regime)?  Yes   Comments regarding appointments  Needs f/u scheduled   Does the patient have a primary care provider?   Yes   Does the patient have an appointment with their PCP within 7 days of discharge?  Yes   Has the patient kept scheduled appointments due by today?  Yes   Psychosocial issues?  No   Did the patient receive a copy of their discharge instructions?  Yes   Nursing interventions  Reviewed instructions with patient   What is the patient's perception of their health status since discharge?  Same   Is the patient/caregiver able to teach back signs and symptoms related to disease process for when to call PCP?  Yes   Is the patient/caregiver able to teach back signs and symptoms related to disease process for when to call 911?  Yes   Is the patient/caregiver able to teach back the hierarchy of who to call/visit for symptoms/problems? PCP, Specialist, Home health nurse, Urgent Care, ED, 911  Yes   If the patient is a current smoker, are they able to teach back resources for cessation?  Not a smoker   Week 1 call completed?  Yes   Wrap up additional comments  pt stated waiting for records from previous doctor.           Melanie Miranda RN

## 2021-02-24 ENCOUNTER — TELEPHONE (OUTPATIENT)
Dept: FAMILY MEDICINE CLINIC | Age: 66
End: 2021-02-24

## 2021-02-24 DIAGNOSIS — K11.21 ACUTE PAROTITIS: ICD-10-CM

## 2021-02-24 DIAGNOSIS — K11.8 MASS OF BOTH PAROTID GLANDS: Primary | ICD-10-CM

## 2021-02-24 LAB
BLOOD CULTURE, ROUTINE: NORMAL
CULTURE, BLOOD 2: NORMAL

## 2021-02-24 RX ORDER — ASPIRIN 325 MG
325 TABLET ORAL DAILY
Qty: 30 TABLET | Refills: 3 | Status: SHIPPED | OUTPATIENT
Start: 2021-02-24 | End: 2022-01-18

## 2021-02-24 NOTE — PAYOR COMM NOTE
"Yolanda Mendenhall (65 y.o. Female) [id number]   RETRO     Admit  2/19     Attn  [facility] phone    Fax         Date of Birth Social Security Number Address Home Phone MRN    1955  [address]  [place] [address] [phone] [id number]    Restorationist Marital Status          Non-Jew        Admission Date Admission Type Admitting Provider Attending Provider Department, Room/Bed    2/19/21 Urgent Abhinav Rojas [facility] 3C, 384/1    Discharge Date Discharge Disposition Discharge Destination        2/21/2021 Home or Self Care              Attending Provider: (none)   Allergies: Ceftin [Cefuroxime Axetil], Codeine, Contrast Dye, Iodinated Diagnostic Agents    Isolation: None   Infection: None   Code Status: Prior    Ht: 157.5 cm (62\")   Wt: 73.3 kg (161 lb 11.2 oz)    Admission Cmt: None   Principal Problem: Acute parotitis [K11.21]                 Active Insurance as of 2/19/2021     Primary Coverage     Payor Plan Insurance Group Employer/Plan Group    [facility] [employer] MEDICARE REPLACEMENT      Payor Plan Address Payor Plan Phone Number Payor Plan Fax Number Effective Dates    [address] [address] [phone]  2/1/2021 - None Entered    [facility] [id number]       Subscriber Name Subscriber Birth Date Member ID       YOLANAD MENDENHALL 1955 [id number]           Secondary Coverage     Payor Plan Insurance Group Employer/Plan Group    [place]      Payor Plan Address Payor Plan Phone Number Payor Plan Fax Number Effective Dates    [address] [phone]  6/14/2017 - None Entered    [facility] [id number]       Subscriber Name Subscriber Birth Date Member ID       YOLANDA MENDENHALL 1955 [id number]                 Emergency Contacts      (Rel.) Home Phone Work Phone Mobile Phone    JOSE LUIS ALBARRAN (Significant Other) [phone] -- " [phone]    Jorge Alberto Argueta (Son) [phone] -- --    Cortes Argueta (Son) [phone] -- --               History & Physical      Cristin Vasquez APRN at 02/19/21 5706     Attestation signed by Tiburcio Phillips MD at 02/20/21 0708    I personally evaluated and examined the patient in conjunction with Cristin Vasquez APRN and agree with the assessment, treatment plan, and disposition of the patient as recorded by her. My history, exam, and further recommendations are:     Patient complains of pain and swelling over right upper neck, she states has had a cyst previously surgically removed on that area. During surgery she had neurological complications that left her with weakness on right hand.     Empiric antibiotics and pain control  ENT evaluation in AM for further recommendations    Electronically signed by Tiburcio Phillips MD, 2/20/2021, 07:06 CST.                        [facility] Medicine Services  HISTORY AND PHYSICAL    Date of Admission: 2/19/2021  Primary Care Physician: Ramana Lynn MD    Subjective     Chief Complaint: Swelling right neck with severe pain    History of Present Illness  Yolanda Jacobs is a 65-year-old female with a past medical history of chronic pain syndrome followed by local pain management, hypothyroidism, anxiety and depression, insulin-dependent diabetes, COPD with ongoing occasional tobacco use, hypertension, hyperlipidemia, strokes x3 with left-sided residual weakness.  Patient presented to [facility] emergency department with complaints of neck pain and swelling.  She states she initially noticed Wednesday night before she went to bed.  She was awakened with pain and noticed the swelling.  She denies fever or hearing loss.  She called her PCP who could not see her until next week therefore she went to [facility] emergency department.  Patient received Rocephin and vancomycin, blood cultures were drawn.  She was transferred  to [facility] direct admit for ENT consultation.  Currently she is stable.  No other complaints.    Patient states in 2018 on October 3 she underwent surgery to remove 2 cyst in the same area and during surgery she did have a stroke.  Surgery was per Dr. Peguero.     Review of Systems   A 10 point review of systems was completed, all negative except for those discussed in HPI    Past Medical History:   Past Medical History:   Diagnosis Date   • COPD (chronic obstructive pulmonary disease) (CMS/HCC)    • Diabetes mellitus (CMS/HCC)    • HTN (hypertension)    • Hyperlipidemia    Anxiety and depression  Arthritis  Irritable bowel syndrome  Neuropathy  Pericardial effusion  Peripheral vascular disease    Past Surgical History:   Past Surgical History:   Procedure Laterality Date   • APPENDECTOMY     • CHOLECYSTECTOMY     • HYSTERECTOMY     • KNEE SURGERY     Colonoscopy  Endoscopy  Finger surgery    Family History: family history includes Cancer in her brother and mother; Diabetes in her mother; Lung cancer in her father; No Known Problems in her sister.    Social History:  reports that she has been smoking. She has been smoking about 0.50 packs per day. She has never used smokeless tobacco. She reports that she does not drink alcohol or use drugs.    Code Status: Full, if unable speak for herself her son Juan Argueta or friend Aly Plunkett will speak for her      Allergies:  Allergies   Allergen Reactions   • Ceftin [Cefuroxime Axetil]    • Codeine Nausea And Vomiting   • Contrast Dye Swelling   • Iodinated Diagnostic Agents        Medications:  No current facility-administered medications on file prior to encounter.      Current Outpatient Medications on File Prior to Encounter   Medication Sig Dispense Refill   • cetirizine (zyrTEC) 10 MG tablet Take 10 mg by mouth Daily.     • escitalopram (LEXAPRO) 20 MG tablet Take 20 mg by mouth Daily.     • HYDROcodone-acetaminophen (NORCO)  MG per tablet Take 1  tablet by mouth Every 8 (Eight) Hours As Needed for Moderate Pain .     • tiotropium (SPIRIVA) 18 MCG per inhalation capsule Place 1 capsule into inhaler and inhale Daily.     • albuterol (ACCUNEB) 0.63 MG/3ML nebulizer solution Take 1 ampule by nebulization every 6 hours as needed for Wheezing     • Alcohol Swabs (ALCOHOL WIPES) pads Use 4 x daily 120 each 11   • ALPRAZolam (XANAX) 1 MG tablet Take 1 mg by mouth 3 (Three) Times a Day As Needed for Anxiety.     • amitriptyline (ELAVIL) 10 MG tablet Take 10 mg by mouth Every Night.     • aspirin 81 MG tablet Take 1 tablet by mouth Daily. 30 tablet 11   • atorvastatin (LIPITOR) 40 MG tablet Take 40 mg by mouth Daily.     • Baclofen 2 % cream Apply  topically 3 (Three) Times a Day.     • Blood Glucose Monitoring Suppl w/Device kit USE AS INDICATED, ANY MONITOR 1 each 1   • calcipotriene (DOVONEX) 0.005 % cream Apply  topically to the appropriate area as directed 2 (Two) Times a Day.     • cyclobenzaprine (FLEXERIL) 10 MG tablet Take 10 mg by mouth 3 (Three) Times a Day As Needed for Muscle Spasms.     • diltiaZEM CD (CARDIZEM CD) 180 MG 24 hr capsule Take 180 mg by mouth Daily.     • esomeprazole (nexIUM) 40 MG capsule Take 40 mg by mouth Every Morning Before Breakfast.     • fluconazole (DIFLUCAN) 150 MG tablet Take 1 tablet by mouth 1 (One) Time Per Week. 4 tablet 6   • fluticasone-salmeterol (ADVAIR) 250-50 MCG/DOSE DISKUS Inhale 1 puff 2 (Two) Times a Day.     • furosemide (LASIX) 40 MG tablet Take 40 mg by mouth 2 (Two) Times a Day.     • gabapentin (NEURONTIN) 400 MG capsule 2 capsules TID (Patient taking differently: 400 mg 3 (Three) Times a Day. 2 capsules TID) 180 capsule 5   • Gabapentin 10 % cream Apply 6 % topically 3 (Three) Times a Day.     • Glucose Blood (BLOOD GLUCOSE TEST) strip Use 4 x daily, use any brand covered by insurance or same brand as before 120 each 11   • insulin aspart (NOVOLOG FLEXPEN) 100 UNIT/ML solution pen-injector sc pen Up to 5-40  "units with meals 12 pen 11   • Insulin Pen Needle (B-D UF III MINI PEN NEEDLES) 31G X 5 MM misc Use 4 times daily 120 each 11   • Lancet Devices (LANCING DEVICE) misc USE AS INDICATED TO CORRELATE WITH STRIPS AND METER 1 each 1   • Lancets 30G misc USE 4 X DAILY 120 each 11   • levothyroxine (SYNTHROID, LEVOTHROID) 25 MCG tablet Take 25 mcg by mouth Daily.     • Lidocaine HCl 2.5 % gel Apply  topically 3 (Three) Times a Day.     • lisinopril (PRINIVIL,ZESTRIL) 20 MG tablet Take 40 mg by mouth Daily.     • loratadine (CLARITIN) 10 MG tablet Take 10 mg by mouth Daily.     • metFORMIN ER (GLUCOPHAGE XR) 500 MG 24 hr tablet 2 tabs twice daily with meals , generic ok 120 tablet 11   • sulfamethoxazole-trimethoprim (BACTRIM,SEPTRA) 400-80 MG tablet Take 1 tablet by mouth 2 (Two) Times a Day. 20 tablet 0   • Tresiba FlexTouch 200 UNIT/ML solution pen-injector pen injection INJECT 70 UNITS UNDER SKIN AS DIRECTED EVERY NIGHT; UP  UNITS DAILY 18 mL 11   • Trulicity 0.75 MG/0.5ML solution pen-injector INJECT THE CONTENTS OF 1 PEN UNDER THE SKIN ON THE SAME DAY EACH WEEK. 2 mL 4   • varenicline (CHANTIX) 0.5 MG tablet Take 0.5 mg by mouth 2 (Two) Times a Day.           Objective     /64 (BP Location: Left arm, Patient Position: Lying)   Pulse 80   Temp 98.1 °F (36.7 °C) (Oral)   Resp 18   Ht 157.5 cm (62\")   Wt 73.3 kg (161 lb 11.2 oz)   SpO2 96%   BMI 29.58 kg/m²   Physical Exam  Vitals signs reviewed.   Constitutional:       Appearance: Normal appearance.   HENT:      Head: Normocephalic and atraumatic.      Mouth/Throat:      Mouth: Mucous membranes are moist.      Pharynx: Oropharynx is clear.   Eyes:      Extraocular Movements: Extraocular movements intact.      Pupils: Pupils are equal, round, and reactive to light.   Neck:      Musculoskeletal: Neck supple.      Comments: Range of motion difficult due to pain  Cardiovascular:      Rate and Rhythm: Normal rate and regular rhythm.      Heart sounds: " Normal heart sounds. No murmur.   Pulmonary:      Effort: Pulmonary effort is normal.      Breath sounds: Normal breath sounds.   Abdominal:      General: Bowel sounds are normal.      Palpations: Abdomen is soft.   Musculoskeletal: Normal range of motion.         General: No swelling.   Lymphadenopathy:      Head:      Right side of head: Posterior auricular (larger than left) adenopathy present.      Left side of head: Posterior auricular adenopathy present.      Cervical: No cervical adenopathy.   Skin:     General: Skin is warm and dry.   Neurological:      General: No focal deficit present.      Mental Status: She is alert and oriented to person, place, and time.   Psychiatric:         Mood and Affect: Mood normal.         Behavior: Behavior normal.           Pertinent Data:   Lab Results (last 72 hours)     Procedure Component Value Units Date/Time    POC Glucose Once [[id number]]  (Abnormal) Collected: 02/19/21 2257    Specimen: Blood Updated: 02/19/21 2333     Glucose 332 mg/dL      Comment: : [id number] Alec HenryaMeter ID: [id number]             Imaging Results (Last 24 Hours)     ** No results found for the last 24 hours. **        Obtained at [facility] emergency department today    Glucose 443, sodium 124 (corrected sodium 132), potassium 4.1, CO2 27, chloride 89, anion gap 8, creatinine 0.9, BUN 15, GFR greater than 60, alkaline phosphatase 136, total protein 6.3, ALT 12, AST 14, total bilirubin less than 0.2, albumin 3.2    WBC 9.7, hemoglobin 11.7, hematocrit 35.2, MCV 88, platelet count 193,000    Lactic acid 2, Covid 19 negative    CT of the neck and Maxillofacial without contrast    Result Impression   Impression: Bilateral lateral neck masses with inflammatory changes  associated with the right neck mass. 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  Multiplicity supports Warthin tumors. However, lymphadenopathy whether infectious  or neoplastic process are in the differential as well.    Signed by Dr Sarina Pompa on 2/19/2021 8:07 PM   Result Narrative   Exam: CT Maxillofacial without contrast.  CT face without contrast  Date: 2/19/2021   Indication: 65 years-year-old Female with right face swelling.  Comparison: None.  Technique: A series of 2.5  mm transaxial tomographic images are  obtained from the inferior aspect of the mandible through the frontal  sinuses. Coronal reformats are performed.  Radiation dose equals  mGy-cm.  Automated exposure control dose  reduction technique was implemented.  Findings:   CT neck and CT face without contrast: This is a limited examination  due to lack of intravenous contrast. Within those limitations: There  is a 1.8 cm right neck hyperdense mass with central hypodense  character. Adjacent fluid and fat stranding. It appears inseparable  from the parotid tail. A similar lesion without inflammatory changes  present on the left, measuring 2.2 x 1.4 cm.  There is no otherwise pathologically enlarged lymph node in the neck.  The visualized intracranial structures, oral cavity, nasopharynx,  retropharynx, larynx demonstrate no discrete mass or fluid collection.  The visualized lung apices are unremarkable.  There is atherosclerotic disease.  There is no acute osseous pathology.   Other Result Information   Francesco, Kyin Incoming Radiology Results From [place] - 02/19/2021  7:09 PM CST  Exam: CT Maxillofacial without contrast.  CT face without contrast  Date: 2/19/2021   Indication: 65 years-year-old Female with right face swelling.  Comparison: None.  Technique: A series of 2.5  mm transaxial tomographic images are  obtained from the inferior aspect of the mandible through the frontal  sinuses. Coronal reformats are performed.  Radiation dose equals  mGy-cm.  Automated exposure control dose  reduction technique was implemented.  Findings:   CT neck and CT face without contrast: This is a  limited examination  due to lack of intravenous contrast. Within those limitations: There  is a 1.8 cm right neck hyperdense mass with central hypodense  character. Adjacent fluid and fat stranding. It appears inseparable  from the parotid tail. A similar lesion without inflammatory changes  present on the left, measuring 2.2 x 1.4 cm.  There is no otherwise pathologically enlarged lymph node in the neck.  The visualized intracranial structures, oral cavity, nasopharynx,  retropharynx, larynx demonstrate no discrete mass or fluid collection.  The visualized lung apices are unremarkable.  There is atherosclerotic disease.  There is no acute osseous pathology.    IMPRESSION:  Impression: Bilateral lateral neck masses with inflammatory changes  associated with the right neck mass. 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  Multiplicity supports Warthin tumors. However, lymphadenopathy whether infectious or neoplastic process are in the differential as well..  Signed by Dr Sarina Pompa on 2/19/2021 8:07 PM       Assessment / Plan     Assessment:   Active Hospital Problems    Diagnosis   • Mass of both parotid glands   • Hyponatremia   • Acute pain   • Uncontrolled type 2 diabetes mellitus, without long-term current use of insulin (CMS/[facility])       Plan:   1.  Admit as inpatient  2.  Consult ENT  3.  N.p.o. after midnight for possible invasive procedure tomorrow  4.  Normal saline 100 mL/hour  5.  DVT prophylaxis with Lovenox  6.  Monitor glucose before meals and at bedtime with regular insulin sliding scale coverage  7.  Home medication reviewed and restarted as appropriate  8.  Pain management  9.  Labs in a.m.  10.  Patient received Rocephin and vancomycin at outlying facility, will hold further treatment until recommendation received by ENT    I discussed the patient's findings and my recommendations with: Tiburcio Phillips MD  Time spent: 35 minutes    Patient  seen and examined by me on 2/19/2021 at 10:47 PM.    Electronically signed by SIMONA Harrison, 02/19/21, 23:46 CST.    Electronically signed by Tiburcio Phillips MD at 02/20/21 0708         No current facility-administered medications for this encounter.      Current Outpatient Medications   Medication Sig Dispense Refill   • cetirizine (zyrTEC) 10 MG tablet Take 10 mg by mouth Daily.     • escitalopram (LEXAPRO) 20 MG tablet Take 20 mg by mouth Daily.     • HYDROcodone-acetaminophen (NORCO)  MG per tablet Take 1 tablet by mouth Every 8 (Eight) Hours As Needed for Moderate Pain .     • tiotropium (SPIRIVA) 18 MCG per inhalation capsule Place 1 capsule into inhaler and inhale Daily.     • albuterol (ACCUNEB) 0.63 MG/3ML nebulizer solution Take 1 ampule by nebulization every 6 hours as needed for Wheezing     • Alcohol Swabs (ALCOHOL WIPES) pads Use 4 x daily 120 each 11   • ALPRAZolam (XANAX) 1 MG tablet Take 1 mg by mouth 3 (Three) Times a Day As Needed for Anxiety.     • amitriptyline (ELAVIL) 10 MG tablet Take 10 mg by mouth Every Night.     • aspirin 81 MG tablet Take 1 tablet by mouth Daily. (Patient taking differently: Take 325 mg by mouth Daily.) 30 tablet 11   • atorvastatin (LIPITOR) 40 MG tablet Take 40 mg by mouth Daily.     • Baclofen 2 % cream Apply  topically 3 (Three) Times a Day.     • Blood Glucose Monitoring Suppl w/Device kit USE AS INDICATED, ANY MONITOR 1 each 1   • calcipotriene (DOVONEX) 0.005 % cream Apply  topically to the appropriate area as directed 2 (Two) Times a Day.     • clindamycin (Cleocin) 300 MG capsule Take 1 capsule by mouth 3 (Three) Times a Day. 21 capsule 0   • cyclobenzaprine (FLEXERIL) 10 MG tablet Take 10 mg by mouth 3 (Three) Times a Day As Needed for Muscle Spasms.     • diltiaZEM CD (CARDIZEM CD) 180 MG 24 hr capsule Take 180 mg by mouth Daily.     • esomeprazole (nexIUM) 40 MG capsule Take 40 mg by mouth Every Morning Before Breakfast.     •  fluconazole (DIFLUCAN) 150 MG tablet Take 1 tablet by mouth 1 (One) Time Per Week. 4 tablet 6   • fluticasone-salmeterol (ADVAIR) 250-50 MCG/DOSE DISKUS Inhale 1 puff 2 (Two) Times a Day.     • furosemide (LASIX) 40 MG tablet Take 40 mg by mouth 2 (Two) Times a Day.     • gabapentin (NEURONTIN) 400 MG capsule 2 capsules TID (Patient taking differently: 400 mg 3 (Three) Times a Day. 2 capsules TID) 180 capsule 5   • Gabapentin 10 % cream Apply 6 % topically 3 (Three) Times a Day.     • Glucose Blood (BLOOD GLUCOSE TEST) strip Use 4 x daily, use any brand covered by insurance or same brand as before 120 each 11   • insulin aspart (NOVOLOG FLEXPEN) 100 UNIT/ML solution pen-injector sc pen Up to 5-40 units with meals 12 pen 11   • Insulin Pen Needle (B-D UF III MINI PEN NEEDLES) 31G X 5 MM misc Use 4 times daily 120 each 11   • Lancet Devices (LANCING DEVICE) misc USE AS INDICATED TO CORRELATE WITH STRIPS AND METER 1 each 1   • Lancets 30G misc USE 4 X DAILY 120 each 11   • levothyroxine (SYNTHROID, LEVOTHROID) 25 MCG tablet Take 25 mcg by mouth Daily.     • Lidocaine HCl 2.5 % gel Apply  topically 3 (Three) Times a Day.     • lisinopril (PRINIVIL,ZESTRIL) 20 MG tablet Take 40 mg by mouth Daily.     • loratadine (CLARITIN) 10 MG tablet Take 10 mg by mouth Daily.     • metFORMIN ER (GLUCOPHAGE XR) 500 MG 24 hr tablet 2 tabs twice daily with meals , generic ok 120 tablet 11   • sulfamethoxazole-trimethoprim (BACTRIM,SEPTRA) 400-80 MG tablet Take 1 tablet by mouth 2 (Two) Times a Day. 20 tablet 0   • Tresiba FlexTouch 200 UNIT/ML solution pen-injector pen injection INJECT 70 UNITS UNDER SKIN AS DIRECTED EVERY NIGHT; UP  UNITS DAILY 18 mL 11   • Trulicity 0.75 MG/0.5ML solution pen-injector INJECT THE CONTENTS OF 1 PEN UNDER THE SKIN ON THE SAME DAY EACH WEEK. 2 mL 4   • varenicline (CHANTIX) 0.5 MG tablet Take 0.5 mg by mouth 2 (Two) Times a Day.          Physician Progress Notes (last 7 days) (Notes from 02/17/21  1422 through 21 1422)      Jigar Iglesias MD at 21 0944             [facility]   Progress Note    Patient Name: Yolanda Mendenhall  : 1955  MRN: [id number]  Primary Care Physician: Ramana Lynn MD  Date of admission: 2021    Subjective   Subjective     Chief Complaint: Right facial swelling    History of Present Illness  Patient Reports slight improvement from yesterday.  She has not had as much soreness and seems to have slightly decreased amount of swelling.  There has been no fevers and her white count has been normal.      Review of Systems   Constitutional: Negative for chills, fatigue and fever.   HENT:        As per HPI   Gastrointestinal: Negative for nausea and vomiting.   Endocrine:        As per HPI       Objective   Objective     Vitals:  Temp:  [97.2 °F (36.2 °C)-99.1 °F (37.3 °C)] 97.8 °F (36.6 °C)  Heart Rate:  [74-90] 86  Resp:  [14-16] 16  BP: (147-160)/(54-66) 147/60    Physical Exam  Vitals signs reviewed.   Constitutional:       Appearance: Normal appearance. She is well-developed.   HENT:      Head: Normocephalic and atraumatic.      Jaw: Jaw tenderness: bhesig.      Comments: There continues to be right-sided facial swelling located at the right tail parotid region.  It is slightly less than yesterday.  It .  This is under a well-healed scar.     Right Ear: External ear normal.      Left Ear: External ear normal.      Nose: Nose normal.   Eyes:      Extraocular Movements: Extraocular movements intact.      Conjunctiva/sclera: Conjunctivae normal.   Neck:      Musculoskeletal: Normal range of motion.   Pulmonary:      Effort: Pulmonary effort is normal. No respiratory distress.      Breath sounds: No stridor.   Musculoskeletal: Normal range of motion.   Skin:     Findings: No rash.   Neurological:      General: No focal deficit present.      Mental Status: She is alert.      Cranial Nerves: No cranial nerve deficit.   Psychiatric:         Behavior:  Behavior normal.         Thought Content: Thought content normal.         Judgment: Judgment normal.          Result Review    Result Review:  I have personally reviewed the results from the time of this admission to 02/21/21 9:44 AM CST and agree with these findings:  [x]  Laboratory- normal white count  []  Microbiology  []  Radiology  []  EKG/Telemetry   []  Cardiology/Vascular   []  Pathology  []  Old records  []  Other:  Most notable findings include: Normal white count    Assessment/Plan   Assessment / Plan     Brief Patient Summary:  Yolanda Mendenhall is a 65 y.o. 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  She has had 3 previous surgeries.  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     Active Hospital Problems:  Active Hospital Problems    Diagnosis   • **Acute parotitis   • Mass of both parotid glands   • Hyponatremia   • Acute pain   • Type 2 diabetes mellitus with hyperglycemia, with long-term current use of insulin (CMS/HCC)   • Mixed diabetic hyperlipidemia associated with type 2 diabetes mellitus (CMS/HCC)   • Hypertension associated with diabetes (CMS/HCC)   • Diabetic polyneuropathy associated with type 2 diabetes mellitus (CMS/HCC)   • Uncontrolled type 2 diabetes mellitus, without long-term current use of insulin (CMS/HCC)       Plan:   I feel that she is able to be transitioned to oral clindamycin.  I have put in orders to have a fine-needle aspiration done under ultrasound guidance.  This can also be done as an outpatient.  I will have her set up to see us back in about 2 weeks to reevaluate.  We will need to get her old records to see what is actually been done and what the previous pathology was.  This will determine whether or not I feel that it is okay to proceed with surgery with me here in [place] versus referral to head neck  surgeon.    Electronically signed by Jigar Iglesias MD, 02/21/21, 9:48 AM CST.               Electronically signed by Jigar Iglesias MD at 02/21/21 0948     Abhinav Rojas DO at 02/20/21 1349              [facility] Medicine Services  INPATIENT PROGRESS NOTE    Length of Stay: 1  Date of Admission: 2/19/2021  Primary Care Physician: Ramana Lynn MD    Subjective     Chief Complaint:     Pain and swelling right neck and jawline    HPI     The patient continues to complain of discomfort this morning.  Discomfort seems to be somewhat improved.  She is currently on IV clindamycin to cover for staph species.  She initially went to [facility] [facility] emergency department for evaluation as her primary care physician could not see her until next week.  She was subsequently transferred to our facility where she was admitted for further treatment and evaluation.  ENT has seen and evaluated the patient and feels as though the patient's parotid masses are consistent with [place]'s tumors.  It is felt that the swelling on the right is more likely fluid collection from that recurrent tumor.  There is no concern for malignancy.  It is doubtful that there is an infectious component as the patient's white blood cell count is within normal limits and she has been afebrile.  ENT recommends treatment with IV antibiotics overnight with discharge tomorrow and possible outpatient ultrasound-guided needle biopsy to attempt to aspirate any accumulated fluids.  ENT notes that revision surgery may be required in the future but would be best done at a [facility] setting given that this would be a third procedure on her parotid glands.    CBC this a.m. is unremarkable except hemoglobin 11.2.  BMP is unremarkable except sodium 131 and glucose 183.    Review of Systems     All pertinent negatives and positives are as above. All other systems have been reviewed and are negative unless otherwise stated.      Objective    Temp:  [97.6 °F (36.4 °C)-98.3 °F (36.8 °C)] 98.3 °F (36.8 °C)  Heart Rate:  [78-89] 88  Resp:  [14-18] 14  BP: (138-161)/(61-70) 149/66    Lab Results (last 24 hours)     Procedure Component Value Units Date/Time    POC Glucose Once [[id number]]  (Abnormal) Collected: 02/20/21 1114    Specimen: Blood Updated: 02/20/21 1126     Glucose 188 mg/dL      Comment: : [id number] Japanese DeannaMeter ID: [id number]       POC Glucose Once [[id number]]  (Abnormal) Collected: 02/20/21 0756    Specimen: Blood Updated: 02/20/21 0807     Glucose 187 mg/dL      Comment: : [id number] Garcia CasandraMeter ID: [id number]       Basic Metabolic Panel [[id number]]  (Abnormal) Collected: 02/20/21 0400    Specimen: Blood Updated: 02/20/21 0457     Glucose 183 mg/dL      BUN 15 mg/dL      Creatinine 0.82 mg/dL      Sodium 131 mmol/L      Potassium 3.8 mmol/L      Chloride 94 mmol/L      CO2 29.0 mmol/L      Calcium 9.2 mg/dL      eGFR Non African Amer 70 mL/min/1.73      BUN/Creatinine Ratio 18.3     Anion Gap 8.0 mmol/L     Narrative:      GFR Normal >60  Chronic Kidney Disease <60  Kidney Failure <15      CBC & Differential [[id number]]  (Abnormal) Collected: 02/20/21 0400    Specimen: Blood Updated: 02/20/21 0450    Narrative:      The following orders were created for panel order CBC & Differential.  Procedure                               Abnormality         Status                     ---------                               -----------         ------                     CBC Auto Differential[[id number]]        Abnormal            Final result                 Please view results for these tests on the individual orders.    CBC Auto Differential [[id number]]  (Abnormal) Collected: 02/20/21 0400    Specimen: Blood Updated: 02/20/21 0450     WBC 7.78 10*3/mm3      RBC 3.75 10*6/mm3      Hemoglobin 11.2 g/dL      Hematocrit 31.8 %      MCV 84.8 fL      MCH 29.9 pg      MCHC 35.2 g/dL      RDW 12.6 %      RDW-SD 38.5 fl       MPV 11.9 fL      Platelets 171 10*3/mm3      Neutrophil % 60.4 %      Lymphocyte % 28.1 %      Monocyte % 9.6 %      Eosinophil % 1.0 %      Basophil % 0.4 %      Immature Grans % 0.5 %      Neutrophils, Absolute 4.69 10*3/mm3      Lymphocytes, Absolute 2.19 10*3/mm3      Monocytes, Absolute 0.75 10*3/mm3      Eosinophils, Absolute 0.08 10*3/mm3      Basophils, Absolute 0.03 10*3/mm3      Immature Grans, Absolute 0.04 10*3/mm3      nRBC 0.0 /100 WBC     Basic Metabolic Panel [[id number]]  (Abnormal) Collected: 02/19/21 2306    Specimen: Blood Updated: 02/20/21 0022     Glucose 343 mg/dL      BUN 15 mg/dL      Creatinine 0.79 mg/dL      Sodium 128 mmol/L      Potassium 4.4 mmol/L      Chloride 91 mmol/L      CO2 29.0 mmol/L      Calcium 9.4 mg/dL      eGFR Non African Amer 74 mL/min/1.73      BUN/Creatinine Ratio 19.0     Anion Gap 8.0 mmol/L     Narrative:      GFR Normal >60  Chronic Kidney Disease <60  Kidney Failure <15      CBC & Differential [[id number]]  (Abnormal) Collected: 02/19/21 2306    Specimen: Blood Updated: 02/20/21 0005    Narrative:      The following orders were created for panel order CBC & Differential.  Procedure                               Abnormality         Status                     ---------                               -----------         ------                     CBC Auto Differential[[id number]]        Abnormal            Final result                 Please view results for these tests on the individual orders.    CBC Auto Differential [[id number]]  (Abnormal) Collected: 02/19/21 2306    Specimen: Blood Updated: 02/20/21 0005     WBC 8.84 10*3/mm3      RBC 3.93 10*6/mm3      Hemoglobin 11.9 g/dL      Hematocrit 33.1 %      MCV 84.2 fL      MCH 30.3 pg      MCHC 36.0 g/dL      RDW 12.5 %      RDW-SD 38.4 fl      MPV 12.3 fL      Platelets 191 10*3/mm3      Neutrophil % 62.3 %      Lymphocyte % 26.9 %      Monocyte % 9.4 %      Eosinophil % 0.8 %      Basophil % 0.3 %      Immature  Grans % 0.3 %      Neutrophils, Absolute 5.50 10*3/mm3      Lymphocytes, Absolute 2.38 10*3/mm3      Monocytes, Absolute 0.83 10*3/mm3      Eosinophils, Absolute 0.07 10*3/mm3      Basophils, Absolute 0.03 10*3/mm3      Immature Grans, Absolute 0.03 10*3/mm3      nRBC 0.0 /100 WBC     POC Glucose Once [[id number]]  (Abnormal) Collected: 02/19/21 2257    Specimen: Blood Updated: 02/19/21 2333     Glucose 332 mg/dL      Comment: : [id number] Alec Herrera ID: [id number]             Imaging Results (Last 24 Hours)     ** No results found for the last 24 hours. **             Intake/Output Summary (Last 24 hours) at 2/20/2021 1349  Last data filed at 2/20/2021 1147  Gross per 24 hour   Intake --   Output 0 ml   Net 0 ml       Physical Exam  Constitutional:       Appearance: Normal appearance.   HENT:      Head: Normocephalic and atraumatic.      Mouth/Throat:      Mouth: Mucous membranes are moist.      Pharynx: Oropharynx is clear.   Eyes:      Extraocular Movements: Extraocular movements intact.      Pupils: Pupils are equal, round, and reactive to light.   Neck:      Musculoskeletal: Neck supple.      Comments: Range of motion difficult due to pain.  Parotid glands enlarged bilaterally.  Right parotid gland is tender to touch.  Cardiovascular:      Rate and Rhythm: Normal rate and regular rhythm.      Heart sounds: Normal heart sounds. No murmur.   Pulmonary:      Effort: Pulmonary effort is normal.      Breath sounds: Normal breath sounds.   Abdominal:      General: Bowel sounds are normal.      Palpations: Abdomen is soft.   Musculoskeletal: Normal range of motion.         General: No swelling.   Lymphadenopathy:   Cervical: No cervical adenopathy.   Skin:     General: Skin is warm and dry.   Neurological:      General: No focal deficit present.      Mental Status: She is alert and oriented to person, place, and time.   Psychiatric:         Mood and Affect: Mood normal.         Behavior: Behavior  normal.    Results Review:  I have reviewed the labs, radiology results, and diagnostic studies since my last progress note and made treatment changes reflective of the results.   I have reviewed the current medications.    Assessment/Plan     Active Hospital Problems    Diagnosis   • **Acute parotitis   • Mass of both parotid glands   • Hyponatremia   • Acute pain   • Type 2 diabetes mellitus with hyperglycemia, with long-term current use of insulin (CMS/[facility])   • Mixed diabetic hyperlipidemia associated with type 2 diabetes mellitus (CMS/HCC)   • Hypertension associated with diabetes (CMS/HCC)   • Diabetic polyneuropathy associated with type 2 diabetes mellitus (CMS/[facility])   • Uncontrolled type 2 diabetes mellitus, without long-term current use of insulin (CMS/[facility])       PLAN:  Continue clindamycin IV  Possible discharge tomorrow on oral clindamycin to follow-up with ENT on an outpatient basis    Electronically signed by Abhinav Rojas DO, 21, 13:49 CST.      Electronically signed by Abhinav Rojas DO at 21 1357          Consult Notes (last 7 days) (Notes from 21 through 21)      Jigar Iglesias MD at 21 0836      Consult Orders    1. Inpatient ENT Consult [[id number]] ordered by Tiburcio Phillips MD                 [facility]   Consult Note    Patient Name: Yolanda Jacobs  : 1955  MRN: [id number]  Primary Care Physician: Ramana Lynn MD  Referring Physician: Tiburcio Phillips,*  Date of admission: 2021    Inpatient ENT Consult  Consult performed by: Jigar Iglesias MD  Consult ordered by: Tiburcio Phillips MD        Subjective   Subjective     Reason for Consult/ Chief Complaint: parotid swelling and masses    Yolanda Jacobs is a 65 y.o. female with a history of chronic pain syndrome followed by local pain management, hypothyroidism, anxiety and depression, insulin-dependent diabetes, COPD with ongoing occasional tobacco use,  hypertension, hyperlipidemia, strokes x3 with left-sided residual weakness, who presented to the [facility] emergency room last night with swelling of the neck.  Work-up in the emergency room showed bilateral parotid masses and some inflammatory changes within the left parotid gland around another lesion.  She has had a problem with chronic pain in the past and reports the right parotid has been very tender.  She has not had fevers.  Her white count was mildly elevated.  She had a very elevated glucose in the emergency room.  She has had 3 surgeries to the right inferior parotid gland in the past.  Two were by  and one was by another surgeon all in [facility].  She reports that she had perioperative strokes x3, 1 during the first procedure and 1 after the first procedure.  She  reports that she has been smoking. She has been smoking about 0.50 packs per day. She has never used smokeless tobacco.      Review of Systems   Constitutional: Negative for fever and unexpected weight change.   HENT: Negative for sore throat, trouble swallowing and voice change.         As per HPI   Eyes: Negative.    Respiratory: Negative.    Cardiovascular: Negative.    Gastrointestinal: Negative.    Musculoskeletal: Positive for arthralgias, back pain, myalgias and neck pain. Negative for neck stiffness.   Skin: Negative.    Neurological: Positive for weakness (Left-sided from her strokes). Negative for dizziness, facial asymmetry and speech difficulty.   Hematological: Negative for adenopathy.        Personal History     Past Medical History:   Diagnosis Date   • COPD (chronic obstructive pulmonary disease) (CMS/HCC)    • Diabetes mellitus (CMS/HCC)    • HTN (hypertension)    • Hyperlipidemia        Past Surgical History:   Procedure Laterality Date   • APPENDECTOMY     • CHOLECYSTECTOMY     • HYSTERECTOMY     • KNEE SURGERY         Family History: family history includes Cancer in her brother and mother; Diabetes in her  mother; Lung cancer in her father; No Known Problems in her sister. Otherwise pertinent FHx was reviewed and not pertinent to current issue.    Social History:  reports that she has been smoking. She has been smoking about 0.50 packs per day. She has never used smokeless tobacco. She reports that she does not drink alcohol or use drugs.    Home Medications:  ALPRAZolam, Alcohol Wipes, Baclofen, Blood Glucose Monitoring Suppl, Blood Glucose Test, Dulaglutide, Gabapentin, HYDROcodone-acetaminophen, Insulin Degludec, Insulin Pen Needle, Lancets 30G, Lancing Device, Lidocaine HCl, albuterol, amitriptyline, aspirin, atorvastatin, calcipotriene, cetirizine, cyclobenzaprine, dilTIAZem CD, escitalopram, esomeprazole, fluconazole, fluticasone-salmeterol, furosemide, gabapentin, insulin aspart, levothyroxine, lisinopril, loratadine, metFORMIN ER, sulfamethoxazole-trimethoprim, tiotropium, and varenicline      Allergies:  Allergies   Allergen Reactions   • Ceftin [Cefuroxime Axetil]    • Codeine Nausea And Vomiting   • Contrast Dye Swelling   • Iodinated Diagnostic Agents        Objective    Objective   Vitals:  Temp:  [97.6 °F (36.4 °C)-98.1 °F (36.7 °C)] 98.1 °F (36.7 °C)  Heart Rate:  [78-89] 89  Resp:  [14-18] 14  BP: (138-161)/(61-70) 138/70    Physical Exam  Vitals signs reviewed.   Constitutional:       Appearance: Normal appearance. She is well-developed.   HENT:      Head: Normocephalic and atraumatic.      Jaw: There is normal jaw occlusion. No trismus.      Salivary Glands: Right salivary gland is tender. Left salivary gland is not tender.        Comments: The right inferior parotid area is tender to palpation.  There is fullness in the lower half of the right parotid just underneath a well-healed scar.  The scar extends to just below the earlobe but does not appear to have extended in the preauricular area.  There is only minimal erythema.  I did not get the sense that there is a cellulitis.  On the left, there is  "a well-rounded cystic area approximately 2 cm at the angle of the mandible.     Right Ear: Hearing and external ear normal.      Left Ear: Hearing and external ear normal.      Nose: Nose normal.      Mouth/Throat:      Mouth: Mucous membranes are dry.   Eyes:      General: Lids are normal. Vision grossly intact.      Extraocular Movements: Extraocular movements intact.      Conjunctiva/sclera: Conjunctivae normal.   Neck:      Musculoskeletal: Normal range of motion.      Thyroid: No thyroid mass or thyroid tenderness.      Trachea: Trachea normal.      Comments: There is no parotid masses as previously described.  Pulmonary:      Effort: Pulmonary effort is normal. No respiratory distress.      Breath sounds: No stridor.   Musculoskeletal: Normal range of motion.   Lymphadenopathy:      Cervical: No cervical adenopathy.   Skin:     Findings: No rash.   Neurological:      General: No focal deficit present.      Mental Status: She is oriented to person, place, and time. She is lethargic.      Cranial Nerves: No cranial nerve deficit.      Comments: She was very lethargic through the examination and had periods where she closed her eyes and either fell asleep or stop sending.  The facial nerve is grossly intact on the right with only minimal right marginal mandibular weakness   Psychiatric:         Attention and Perception: She is inattentive.         Mood and Affect: Mood normal.         Behavior: Behavior is slowed.         Thought Content: Thought content normal.         Judgment: Judgment normal.      Comments: Reported that \"everything hurt\"         Result Review    Result Review:  I have personally reviewed the results from the time of this admission to 2/20/2021 08:36 CST and agree with these findings:  [x]  Laboratory  []  Microbiology  [x]  Radiology  []  EKG/Telemetry   []  Cardiology/Vascular   []  Pathology  [x]  Old records  []  Other:  Most notable findings include: Blood glucose was elevated to 443 it " "is now down to 187.  White blood cell count is 9.7 on admission with most recent down to 7.78 in Care Everywhere, there is a CT scan of the neck from November 26, 2018 that showed mild subcutaneous soft tissue prominence with probable skin thickening of the right lateral neck inferior to the mandible.  There also were prominent parotid gland soft tissue nodules with one measuring at least 1.7 cm in the left.  CT scan of the neck from February 19, 2021 in the emergency room at [facility] showed bilateral neck masses with inflammatory changes within the right neck mass area.  These masses were within the parotid glands.      Assessment/Plan   Assessment / Plan     Brief Patient Summary:  Yolanda Jacobs is a 65 y.o. female who has had a history of 3 previous surgeries to the right parotid gland.  These were reportedly benign and she recognized the term \"[place]'s tumor\".  She presented to the [facility] emergency room with swelling of the right parotid area with a minimally elevated white count which is gone down to normal, no fevers, but an elevated blood glucose level.  She has a history of strokes in the perioperative procedure after her parotid resection but is uncertain if any work-up has been done for these.  I have no old records to evaluate this.  I feel that her masses in the parotid gland and are consistent with [place]'s tumor and there is a possibility that the swelling that she has on the right-hand side is more representation of fluid collection from a recurrent [place]'s tumor.  I am not concerned about malignancy.  I am questioning whether there is truly an infectious component to this given the white blood cell count findings and the lack of fever.  Her examination and history is heavily clouded by her chronic pain.  Though she was tender in the right parotid area, everything that was touched was tender.  I do not doubt that there is soreness to the right parotid area but her chronic pain syndrome may magnify " the normal responses to her current clinical situation.      Active Hospital Problems:  Active Hospital Problems    Diagnosis   • Mass of both parotid glands   • Hyponatremia   • Acute pain   • Uncontrolled type 2 diabetes mellitus, without long-term current use of insulin (CMS/[facility])       Plan:   I would go ahead and treat her with IV antibiotics to cover staph type species which would be common in a acute parotitis situation.  If she does not improve, we can consider doing a ultrasound-guided needle biopsy to see if there is any fluid that can be aspirated both to reduce the tenderness but also to get pathologic tissue.  At some point in time, she may require revision surgery but given her previous history of perioperative strokes, I would need to get more information to make sure that this would be prevented.  Given the fact that this would be the third procedure on the parotid gland, she may benefit from referral to a [facility].  However this would be considered only after her acute situation improves and can be evaluated on an outpatient basis.    Electronically signed by Jigar Iglesias MD, 02/20/21, 9:18 AM CST.      Electronically signed by Jigar Iglesias MD at 02/20/21 0918          Discharge Summary      Abhinav Rojas DO at 02/21/21 1256              [facility] Medicine Services  DISCHARGE SUMMARY       Date of Admission: 2/19/2021  Date of Discharge:  2/21/2021  Primary Care Physician: Ramana Lynn MD    Discharge Diagnoses:  Active Hospital Problems    Diagnosis   • **Acute parotitis   • Mass of both parotid glands   • Hyponatremia   • Acute pain   • Type 2 diabetes mellitus with hyperglycemia, with long-term current use of insulin (CMS/[facility])   • Mixed diabetic hyperlipidemia associated with type 2 diabetes mellitus (CMS/HCC)   • Hypertension associated with diabetes (CMS/[facility])   • Diabetic polyneuropathy associated with type 2  diabetes mellitus (CMS/[facility])   • Uncontrolled type 2 diabetes mellitus, without long-term current use of insulin (CMS/[facility])         Presenting Problem/History of Present Illness:  Facial cellulitis [L03.211]     Chief Complaint on Day of Discharge:   Pain and swelling right neck and jawline    History of Present Illness on Day of Discharge:   The patient's discomfort is decreased somewhat.  She continues to have swelling that is unrelieved.  ENT has seen and evaluated the patient and agrees that this is unlikely to be infectious however we will continue to cover for staph species with oral clindamycin on an outpatient basis.  Both services agree that she is stable for discharge home.  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  The patient is being scheduled for an outpatient needle biopsy and will follow up with Dr. Iglesias in 2 weeks.    Hospital Course  Yolanda Jacobs is a 65-year-old female with a past medical history of chronic pain syndrome followed by local pain management, hypothyroidism, anxiety and depression, insulin-dependent diabetes, COPD with ongoing occasional tobacco use, hypertension, hyperlipidemia, strokes x3 with left-sided residual weakness.  Patient presented to [facility] emergency department with complaints of neck pain and swelling.  She states she initially noticed Wednesday night before she went to bed.  She was awakened with pain and noticed the swelling.  She denies fever or hearing loss.  She called her PCP who could not see her until next week therefore she went to [facility] emergency department.  Patient received Rocephin and vancomycin, blood cultures were drawn.  She was transferred to [facility] direct admit for ENT consultation.  Currently she is stable.  No other complaints.   Patient states in 2018 on October 3 she underwent surgery to remove 2 cyst in the same area and during surgery she did have a stroke.  Surgery was per Dr. Peguero.   Plan:   1.  Admit  as inpatient  2.  Consult ENT  3.  N.p.o. after midnight for possible invasive procedure tomorrow  4.  Normal saline 100 mL/hour  5.  DVT prophylaxis with Lovenox  6.  Monitor glucose before meals and at bedtime with regular insulin sliding scale coverage  7.  Home medication reviewed and restarted as appropriate  8.  Pain management  9.  Labs in a.m.  10.  Patient received Rocephin and vancomycin at outlying facility, will hold further treatment until recommendation received by ENT    Imaging obtained at [facility] was reviewed.  The patient was started on clindamycin 900 mg IV every 8 hours shortly after admission.  ENT saw and evaluated the patient and agreed that IV antibiotics were appropriate given the circumstance however symptoms did not specifically suggest an infectious etiology, rather recurrence of Warthin's tumors.      Consults:   ENT:  Plan:   I would go ahead and treat her with IV antibiotics to cover staph type species which would be common in a acute parotitis situation.  If she does not improve, we can consider doing a ultrasound-guided needle biopsy to see if there is any fluid that can be aspirated both to reduce the tenderness but also to get pathologic tissue.  At some point in time, she may require revision surgery but given her previous history of perioperative strokes, I would need to get more information to make sure that this would be prevented.  Given the fact that this would be the third procedure on the parotid gland, she may benefit from referral to a [facility].  However this would be considered only after her acute situation improves and can be evaluated on an outpatient basis.     Electronically signed by Jigar Iglesias MD  Plan:   I feel that she is able to be transitioned to oral clindamycin.  I have put in orders to have a fine-needle aspiration done under ultrasound guidance.  This can also be done as an outpatient.  I will have her set up to  see us back in about 2 weeks to reevaluate.  We will need to get her old records to see what is actually been done and what the previous pathology was.  This will determine whether or not I feel that it is okay to proceed with surgery with me here in [place] versus referral to head neck surgeon.     Electronically signed by Jigar Iglesias MD    Pertinent Test Results:   Lab Results (last 7 days)     Procedure Component Value Units Date/Time    POC Glucose Once [[id number]]  (Abnormal) Collected: 02/21/21 1139    Specimen: Blood Updated: 02/21/21 1151     Glucose 278 mg/dL      Comment: : YOLANDA Manriquez ChristyMeter ID: [id number]       POC Glucose Once [[id number]]  (Abnormal) Collected: 02/21/21 0827    Specimen: Blood Updated: 02/21/21 0848     Glucose 175 mg/dL      Comment: : YOLANDA Manriquez Matrix-BioyMeter ID: [id number]       Basic Metabolic Panel [[id number]]  (Abnormal) Collected: 02/21/21 0410    Specimen: Blood Updated: 02/21/21 0519     Glucose 147 mg/dL      BUN 17 mg/dL      Creatinine 0.82 mg/dL      Sodium 133 mmol/L      Potassium 4.6 mmol/L      Chloride 101 mmol/L      CO2 26.0 mmol/L      Calcium 8.8 mg/dL      eGFR Non African Amer 70 mL/min/1.73      BUN/Creatinine Ratio 20.7     Anion Gap 6.0 mmol/L     Narrative:      GFR Normal >60  Chronic Kidney Disease <60  Kidney Failure <15      CBC & Differential [[id number]]  (Abnormal) Collected: 02/21/21 0410    Specimen: Blood Updated: 02/21/21 0456    Narrative:      The following orders were created for panel order CBC & Differential.  Procedure                               Abnormality         Status                     ---------                               -----------         ------                     CBC Auto Differential[[id number]]        Abnormal            Final result                 Please view results for these tests on the individual orders.    CBC Auto Differential [[id number]]  (Abnormal) Collected: 02/21/21  0410    Specimen: Blood Updated: 02/21/21 0456     WBC 8.05 10*3/mm3      RBC 3.50 10*6/mm3      Hemoglobin 10.4 g/dL      Hematocrit 30.0 %      MCV 85.7 fL      MCH 29.7 pg      MCHC 34.7 g/dL      RDW 12.6 %      RDW-SD 39.3 fl      MPV 11.7 fL      Platelets 162 10*3/mm3      Neutrophil % 55.9 %      Lymphocyte % 33.9 %      Monocyte % 8.6 %      Eosinophil % 1.1 %      Basophil % 0.1 %      Immature Grans % 0.4 %      Neutrophils, Absolute 4.50 10*3/mm3      Lymphocytes, Absolute 2.73 10*3/mm3      Monocytes, Absolute 0.69 10*3/mm3      Eosinophils, Absolute 0.09 10*3/mm3      Basophils, Absolute 0.01 10*3/mm3      Immature Grans, Absolute 0.03 10*3/mm3      nRBC 0.0 /100 WBC     POC Glucose Once [[id number]]  (Abnormal) Collected: 02/20/21 2037    Specimen: Blood Updated: 02/20/21 2049     Glucose 153 mg/dL      Comment: : [id number] Wendy Núñez  LMeter ID: [id number]       POC Glucose Once [[id number]]  (Abnormal) Collected: 02/20/21 1713    Specimen: Blood Updated: 02/20/21 1734     Glucose 191 mg/dL      Comment: : [id number] Omar DeannaMeter ID: [id number]       POC Glucose Once [[id number]]  (Abnormal) Collected: 02/20/21 1114    Specimen: Blood Updated: 02/20/21 1126     Glucose 188 mg/dL      Comment: : [id number] Omar DeannaMeter ID: [id number]       POC Glucose Once [[id number]]  (Abnormal) Collected: 02/20/21 0756    Specimen: Blood Updated: 02/20/21 0807     Glucose 187 mg/dL      Comment: : [id number] Jose Murray ID: [id number]       Basic Metabolic Panel [[id number]]  (Abnormal) Collected: 02/20/21 0400    Specimen: Blood Updated: 02/20/21 0457     Glucose 183 mg/dL      BUN 15 mg/dL      Creatinine 0.82 mg/dL      Sodium 131 mmol/L      Potassium 3.8 mmol/L      Chloride 94 mmol/L      CO2 29.0 mmol/L      Calcium 9.2 mg/dL      eGFR Non African Amer 70 mL/min/1.73      BUN/Creatinine Ratio 18.3     Anion Gap 8.0 mmol/L     Narrative:      GFR Normal >60  Chronic  Kidney Disease <60  Kidney Failure <15      CBC & Differential [[id number]]  (Abnormal) Collected: 02/20/21 0400    Specimen: Blood Updated: 02/20/21 0450    Narrative:      The following orders were created for panel order CBC & Differential.  Procedure                               Abnormality         Status                     ---------                               -----------         ------                     CBC Auto Differential[[id number]]        Abnormal            Final result                 Please view results for these tests on the individual orders.    CBC Auto Differential [[id number]]  (Abnormal) Collected: 02/20/21 0400    Specimen: Blood Updated: 02/20/21 0450     WBC 7.78 10*3/mm3      RBC 3.75 10*6/mm3      Hemoglobin 11.2 g/dL      Hematocrit 31.8 %      MCV 84.8 fL      MCH 29.9 pg      MCHC 35.2 g/dL      RDW 12.6 %      RDW-SD 38.5 fl      MPV 11.9 fL      Platelets 171 10*3/mm3      Neutrophil % 60.4 %      Lymphocyte % 28.1 %      Monocyte % 9.6 %      Eosinophil % 1.0 %      Basophil % 0.4 %      Immature Grans % 0.5 %      Neutrophils, Absolute 4.69 10*3/mm3      Lymphocytes, Absolute 2.19 10*3/mm3      Monocytes, Absolute 0.75 10*3/mm3      Eosinophils, Absolute 0.08 10*3/mm3      Basophils, Absolute 0.03 10*3/mm3      Immature Grans, Absolute 0.04 10*3/mm3      nRBC 0.0 /100 WBC     Basic Metabolic Panel [[id number]]  (Abnormal) Collected: 02/19/21 2306    Specimen: Blood Updated: 02/20/21 0022     Glucose 343 mg/dL      BUN 15 mg/dL      Creatinine 0.79 mg/dL      Sodium 128 mmol/L      Potassium 4.4 mmol/L      Chloride 91 mmol/L      CO2 29.0 mmol/L      Calcium 9.4 mg/dL      eGFR Non African Amer 74 mL/min/1.73      BUN/Creatinine Ratio 19.0     Anion Gap 8.0 mmol/L     Narrative:      GFR Normal >60  Chronic Kidney Disease <60  Kidney Failure <15      CBC & Differential [[id number]]  (Abnormal) Collected: 02/19/21 2306    Specimen: Blood Updated: 02/20/21 0005    Narrative:      The  following orders were created for panel order CBC & Differential.  Procedure                               Abnormality         Status                     ---------                               -----------         ------                     CBC Auto Differential[[id number]]        Abnormal            Final result                 Please view results for these tests on the individual orders.    CBC Auto Differential [[id number]]  (Abnormal) Collected: 02/19/21 2306    Specimen: Blood Updated: 02/20/21 0005     WBC 8.84 10*3/mm3      RBC 3.93 10*6/mm3      Hemoglobin 11.9 g/dL      Hematocrit 33.1 %      MCV 84.2 fL      MCH 30.3 pg      MCHC 36.0 g/dL      RDW 12.5 %      RDW-SD 38.4 fl      MPV 12.3 fL      Platelets 191 10*3/mm3      Neutrophil % 62.3 %      Lymphocyte % 26.9 %      Monocyte % 9.4 %      Eosinophil % 0.8 %      Basophil % 0.3 %      Immature Grans % 0.3 %      Neutrophils, Absolute 5.50 10*3/mm3      Lymphocytes, Absolute 2.38 10*3/mm3      Monocytes, Absolute 0.83 10*3/mm3      Eosinophils, Absolute 0.07 10*3/mm3      Basophils, Absolute 0.03 10*3/mm3      Immature Grans, Absolute 0.03 10*3/mm3      nRBC 0.0 /100 WBC     POC Glucose Once [[id number]]  (Abnormal) Collected: 02/19/21 2257    Specimen: Blood Updated: 02/19/21 2333     Glucose 332 mg/dL      Comment: : [id number] Alec Marinoter ID: [id number]         Exam: CT Maxillofacial without contrast.  CT face without contrast  Date: 2/19/2021   Indication: 65 years-year-old Female with right face swelling.  Comparison: None.  Technique: A series of 2.5  mm transaxial tomographic images are  obtained from the inferior aspect of the mandible through the frontal  sinuses. Coronal reformats are performed.  Radiation dose equals  mGy-cm.  Automated exposure control dose  reduction technique was implemented.  Findings:   CT neck and CT face without contrast: This is a limited examination  due to lack of intravenous contrast. Within those  "limitations: There  is a 1.8 cm right neck hyperdense mass with central hypodense  character. Adjacent fluid and fat stranding. It appears inseparable  from the parotid tail. A similar lesion without inflammatory changes  present on the left, measuring 2.2 x 1.4 cm.  There is no otherwise pathologically enlarged lymph node in the neck.  The visualized intracranial structures, oral cavity, nasopharynx,  retropharynx, larynx demonstrate no discrete mass or fluid collection.  The visualized lung apices are unremarkable.  There is atherosclerotic disease.  There is no acute osseous pathology.    Condition on Discharge:    Stable    Physical Exam on Discharge:  /66 (BP Location: Left arm, Patient Position: Lying) Comment: Martha PICKARD notified  Pulse 83   Temp 97.8 °F (36.6 °C) (Oral)   Resp 16   Ht 157.5 cm (62\")   Wt 73.3 kg (161 lb 11.2 oz)   SpO2 98%   BMI 29.58 kg/m²   Physical Exam           Discharge Disposition:  Home or Self Care    Discharge Medications:     Discharge Medications      New Medications      Instructions Start Date   clindamycin 300 MG capsule  Commonly known as: Cleocin   300 mg, Oral, 3 Times Daily         Changes to Medications      Instructions Start Date   aspirin 81 MG tablet  What changed: how much to take   81 mg, Oral, Daily      gabapentin 400 MG capsule  Commonly known as: NEURONTIN  What changed:   · how much to take  · when to take this   2 capsules TID      HYDROcodone-acetaminophen  MG per tablet  Commonly known as: NORCO  What changed: Another medication with the same name was removed. Continue taking this medication, and follow the directions you see here.   1 tablet, Oral, Every 8 Hours PRN         Continue These Medications      Instructions Start Date   albuterol 0.63 MG/3ML nebulizer solution  Commonly known as: ACCUNEB   Take 1 ampule by nebulization every 6 hours as needed for Wheezing      Alcohol Wipes pads   Use 4 x daily      ALPRAZolam 1 MG " tablet  Commonly known as: XANAX   1 mg, Oral, 3 Times Daily PRN      amitriptyline 10 MG tablet  Commonly known as: ELAVIL   10 mg, Oral, Nightly      atorvastatin 40 MG tablet  Commonly known as: LIPITOR   40 mg, Oral, Daily      Baclofen 2 % cream   Apply externally, 3 Times Daily      Blood Glucose Monitoring Suppl w/Device kit   USE AS INDICATED, ANY MONITOR      Blood Glucose Test strip   Use 4 x daily, use any brand covered by insurance or same brand as before       calcipotriene 0.005 % cream  Commonly known as: DOVONEX   Topical, 2 Times Daily      cetirizine 10 MG tablet  Commonly known as: zyrTEC   10 mg, Oral, Daily      cyclobenzaprine 10 MG tablet  Commonly known as: FLEXERIL   10 mg, Oral, 3 Times Daily PRN      dilTIAZem  MG 24 hr capsule  Commonly known as: CARDIZEM CD   180 mg, Oral, Daily      escitalopram 20 MG tablet  Commonly known as: LEXAPRO   20 mg, Oral, Daily      esomeprazole 40 MG capsule  Commonly known as: nexIUM   40 mg, Oral, Every Morning Before Breakfast      fluconazole 150 MG tablet  Commonly known as: DIFLUCAN   150 mg, Oral, Weekly      fluticasone-salmeterol 250-50 MCG/DOSE DISKUS  Commonly known as: ADVAIR   1 puff, Inhalation, 2 Times Daily - RT      furosemide 40 MG tablet  Commonly known as: LASIX   40 mg, Oral, 2 Times Daily      Gabapentin 10 % cream   6 %, Apply externally, 3 Times Daily      insulin aspart 100 UNIT/ML solution pen-injector sc pen  Commonly known as: NovoLOG FlexPen   Up to 5-40 units with meals      Insulin Pen Needle 31G X 5 MM misc  Commonly known as: B-D UF III MINI PEN NEEDLES   Use 4 times daily       Lancets 30G misc   USE 4 X DAILY      Lancing Device misc   USE AS INDICATED TO CORRELATE WITH STRIPS AND METER      levothyroxine 25 MCG tablet  Commonly known as: SYNTHROID, LEVOTHROID   25 mcg, Oral, Daily      Lidocaine HCl 2.5 % gel   Apply externally, 3 Times Daily      lisinopril 20 MG tablet  Commonly known as: PRINIVIL,ZESTRIL   40 mg,  Oral, Daily      loratadine 10 MG tablet  Commonly known as: CLARITIN   10 mg, Oral, Daily      metFORMIN  MG 24 hr tablet  Commonly known as: Glucophage XR   2 tabs twice daily with meals , generic ok      sulfamethoxazole-trimethoprim 400-80 MG tablet  Commonly known as: BACTRIM,SEPTRA   1 tablet, Oral, 2 Times Daily      tiotropium 18 MCG per inhalation capsule  Commonly known as: SPIRIVA   1 capsule, Inhalation, Daily - RT      Tresiba FlexTouch 200 UNIT/ML solution pen-injector pen injection  Generic drug: Insulin Degludec   INJECT 70 UNITS UNDER SKIN AS DIRECTED EVERY NIGHT; UP  UNITS DAILY      Trulicity 0.75 MG/0.5ML solution pen-injector  Generic drug: Dulaglutide   INJECT THE CONTENTS OF 1 PEN UNDER THE SKIN ON THE SAME DAY EACH WEEK.      varenicline 0.5 MG tablet  Commonly known as: CHANTIX   0.5 mg, Oral, 2 Times Daily             Discharge Diet:   Diet Instructions     Diet: Regular; Thin      Discharge Diet: Regular    Fluid Consistency: Thin          Discharge Care Plan / Instructions:   Discharge home    Activity at Discharge:   Activity Instructions     Activity as Tolerated            Follow-up Appointments:  Follow-up with primary care physician next week  Outpatient FNA as scheduled by Dr. Iglesias  Follow-up with Dr. Iglesias in 2 weeks       Electronically signed by Abhinav Rojas DO, 02/21/21, 12:57 CST.    Time: Discharge less than 30 min    Part of this note may be an electronic transcription/translation of spoken language to printed text using the Dragon Dictation system.        Electronically signed by Abhinav Rojas DO at 02/21/21 1303       Nurse note:   Transferred from [facility] with bilat parotid masses. Swelling, redness, and tenderness noted to R neck extending into face. Pt states she has pain with chewing. ENT consult needed. Accucheck with SS coverage. NPO since midnight for possible surgery. Ambulates with standby assist. Pt has had hx of stroke with some L side  residual. IVF infusing. Lovenox. VSS.    2/20 dilaudid iv x2           Na 124

## 2021-02-26 ENCOUNTER — TRANSCRIBE ORDERS (OUTPATIENT)
Dept: LAB | Facility: HOSPITAL | Age: 66
End: 2021-02-26

## 2021-02-26 DIAGNOSIS — Z01.818 PRE-OP TESTING: Primary | ICD-10-CM

## 2021-02-27 ENCOUNTER — LAB (OUTPATIENT)
Dept: LAB | Facility: HOSPITAL | Age: 66
End: 2021-02-27

## 2021-02-27 LAB — SARS-COV-2 ORF1AB RESP QL NAA+PROBE: NOT DETECTED

## 2021-02-27 PROCEDURE — C9803 HOPD COVID-19 SPEC COLLECT: HCPCS | Performed by: EMERGENCY MEDICINE

## 2021-02-27 PROCEDURE — U0004 COV-19 TEST NON-CDC HGH THRU: HCPCS | Performed by: EMERGENCY MEDICINE

## 2021-03-02 ENCOUNTER — HOSPITAL ENCOUNTER (OUTPATIENT)
Dept: ULTRASOUND IMAGING | Facility: HOSPITAL | Age: 66
Discharge: HOME | End: 2021-03-02
Admitting: EMERGENCY MEDICINE

## 2021-03-02 DIAGNOSIS — K11.8 MASS OF BOTH PAROTID GLANDS: ICD-10-CM

## 2021-03-02 DIAGNOSIS — K11.21 ACUTE PAROTITIS: ICD-10-CM

## 2021-03-02 PROCEDURE — 88333 PATH CONSLTJ SURG CYTO XM 1: CPT | Performed by: EMERGENCY MEDICINE

## 2021-03-02 PROCEDURE — 76942 ECHO GUIDE FOR BIOPSY: CPT

## 2021-03-02 PROCEDURE — 76536 US EXAM OF HEAD AND NECK: CPT

## 2021-03-02 PROCEDURE — 88172 CYTP DX EVAL FNA 1ST EA SITE: CPT | Performed by: EMERGENCY MEDICINE

## 2021-03-02 PROCEDURE — 88305 TISSUE EXAM BY PATHOLOGIST: CPT | Performed by: EMERGENCY MEDICINE

## 2021-03-03 ENCOUNTER — READMISSION MANAGEMENT (OUTPATIENT)
Dept: CALL CENTER | Facility: HOSPITAL | Age: 66
End: 2021-03-03

## 2021-03-03 DIAGNOSIS — F41.9 ANXIETY: ICD-10-CM

## 2021-03-03 LAB
CYTO UR: NORMAL
LAB AP CASE REPORT: NORMAL
LAB AP CLINICAL INFORMATION: NORMAL
LAB AP DIAGNOSIS COMMENT: NORMAL
Lab: NORMAL
PATH REPORT.FINAL DX SPEC: NORMAL
PATH REPORT.GROSS SPEC: NORMAL

## 2021-03-03 RX ORDER — ALPRAZOLAM 1 MG/1
TABLET ORAL
Qty: 65 TABLET | Refills: 2 | OUTPATIENT
Start: 2021-03-03 | End: 2021-06-03

## 2021-03-03 NOTE — OUTREACH NOTE
Medical Week 2 Survey      Responses   [facility] patient discharged from?  [place]   Does the patient have one of the following disease processes/diagnoses(primary or secondary)?  Other   Week 2 attempt successful?  Yes   Call start time  1317   Discharge diagnosis  Acute parotitis, mass of both parotid glands   Call end time  1334   Meds reviewed with patient/caregiver?  Yes   Is the patient having any side effects they believe may be caused by any medication additions or changes?  No   Does the patient have all medications ordered at discharge?  Yes   Prescription comments  Patient continues on clindamycin   Is the patient taking all medications as directed (includes completed medication regime)?  Yes   Does the patient have a primary care provider?   Yes   Does the patient have an appointment with their PCP within 7 days of discharge?  Yes   Comments regarding PCP  Dr. Lynn -patient saw PCP the day after discharge.   Has the patient kept scheduled appointments due by today?  Yes   Comments  Patient will see Dr. Iglesias on monday patient may need surgery in [place]   Psychosocial issues?  No   Did the patient receive a copy of their discharge instructions?  Yes   Nursing interventions  Reviewed instructions with patient   What is the patient's perception of their health status since discharge?  Improving   Is the patient/caregiver able to teach back signs and symptoms related to disease process for when to call PCP?  Yes   Is the patient/caregiver able to teach back signs and symptoms related to disease process for when to call 911?  Yes   Is the patient/caregiver able to teach back the hierarchy of who to call/visit for symptoms/problems? PCP, Specialist, Home health nurse, Urgent Care, ED, 911  Yes   If the patient is a current smoker, are they able to teach back resources for cessation?  Not a smoker   Additional teach back comments  Pt had biopsy yesterday. the mass openned and drained purulent fluid.   She states it continues to drain.  She is using dressings at night.  She was provided with Dr. Iglesias's number to call office to update.   Week 2 Call Completed?  Yes   Wrap up additional comments  Patient reports she is going ok at this time.           Debbi Lanier RN

## 2021-03-08 ENCOUNTER — OFFICE VISIT (OUTPATIENT)
Dept: OTOLARYNGOLOGY | Facility: CLINIC | Age: 66
End: 2021-03-08

## 2021-03-08 VITALS
DIASTOLIC BLOOD PRESSURE: 91 MMHG | BODY MASS INDEX: 30.36 KG/M2 | SYSTOLIC BLOOD PRESSURE: 207 MMHG | TEMPERATURE: 97.8 F | WEIGHT: 165 LBS | HEART RATE: 87 BPM | HEIGHT: 62 IN

## 2021-03-08 DIAGNOSIS — K11.8 MASS OF BOTH PAROTID GLANDS: Primary | ICD-10-CM

## 2021-03-08 DIAGNOSIS — D11.9 WARTHIN'S TUMOR: ICD-10-CM

## 2021-03-08 PROCEDURE — 99213 OFFICE O/P EST LOW 20 MIN: CPT | Performed by: OTOLARYNGOLOGY

## 2021-03-08 NOTE — PROGRESS NOTES
" Jigar Iglesias MD     Chief Complaint   Patient presents with   • Facial Pain          HPI  Yolanda Mendenhall is a  65 y.o. female who is here for follow up.  She was seen as a hospital consultation with a recurring cyst in the right parotid/neck.  I have gotten the old records and reviewed them.  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  She has had a fine-needle aspiration of the area which showed suggestion of recurrent Warthin's tumor.            Vital Signs:   Temp:  [97.8 °F (36.6 °C)] 97.8 °F (36.6 °C)  Heart Rate:  [87] 87  BP: (207)/(91) 207/91    Physical Exam  Vitals reviewed.   Constitutional:       Appearance: Normal appearance. She is well-developed.   HENT:      Head: Normocephalic and atraumatic.      Salivary Glands: Right salivary gland is tender. Right salivary gland is not diffusely enlarged.      Comments: There is a well-healed parotidectomy scar on the right side.  At the midportion of the neck scar, there is a scabbed area.  There is a new resolution of the fluid collection from the hospitalization.  There is a 1 to 1.5 cm nodule in the left tail parotid region.     Right Ear: External ear normal.      Left Ear: External ear normal.      Nose: Nose normal.   Eyes:      Extraocular Movements: Extraocular movements intact.      Conjunctiva/sclera: Conjunctivae normal.   Pulmonary:      Effort: Pulmonary effort is normal. No respiratory distress.      Breath sounds: No stridor.   Musculoskeletal:         General: Normal range of motion.      " Cervical back: Normal range of motion.   Skin:     Findings: No rash.   Neurological:      General: No focal deficit present.      Mental Status: She is alert.      Cranial Nerves: Cranial nerves are intact. No cranial nerve deficit.   Psychiatric:         Behavior: Behavior normal.         Thought Content: Thought content normal.         Judgment: Judgment normal.            RESULTS REVIEW:    I have reviewed the patients old records in the chart.  The following results/records were reviewed:   EXTERNAL MEDICAL RECORDS - SCAN - [place] Records (02/22/2021)   -Surgeries x3 to the site including at least one superficial parotidectomy with AlloDerm reconstruction.     Lab Results   Component Value Date    FINALDX  03/02/2021     Right parotid gland, fine-needle aspiration, smears (2) and cell block:  A.  Many small, mature lymphocytes and neutrophils with a few macrophages.  B.  Glandular epithelial cells occasionally demonstrating an apocrine appearance.  C.  Blood.  D.  Foci of necrosis.           Assessment/Plan    Assessment:   1. Mass of both parotid glands    2. Warthin's tumor        Plan:   I feel that she would require reexcision of the right parotid region and eventually a left parotidectomy.  Due to the amount of surgery that she has had in the area and the likelihood of scarring around the facial nerve, we have discussed the option of referral to [facility].  She is in agreement.                  No follow-ups on file.         Jigar Iglesias MD  03/08/21  11:43 CST

## 2021-03-11 ENCOUNTER — TELEPHONE (OUTPATIENT)
Dept: FAMILY MEDICINE CLINIC | Age: 66
End: 2021-03-11

## 2021-03-11 ENCOUNTER — READMISSION MANAGEMENT (OUTPATIENT)
Dept: CALL CENTER | Facility: HOSPITAL | Age: 66
End: 2021-03-11

## 2021-03-11 DIAGNOSIS — K11.21 ACUTE PAROTITIS: Primary | ICD-10-CM

## 2021-03-11 NOTE — TELEPHONE ENCOUNTER
Miladys from Dr Herr's office called to inform us that due to the patient's insurance, she needs a referral sent to Dr Delio Mata at [facility]. Patient already has their notes and appt set up for April 21 @ .        Fax [phone]

## 2021-03-11 NOTE — OUTREACH NOTE
Medical Week 3 Survey      Responses   [facility] patient discharged from?  [place]   Does the patient have one of the following disease processes/diagnoses(primary or secondary)?  Other   Week 3 attempt successful?  No   Unsuccessful attempts  Attempt 1          Rosio Nowak RN

## 2021-03-16 ENCOUNTER — READMISSION MANAGEMENT (OUTPATIENT)
Dept: CALL CENTER | Facility: HOSPITAL | Age: 66
End: 2021-03-16

## 2021-03-16 NOTE — OUTREACH NOTE
Medical Week 3 Survey      Responses   [facility] patient discharged from?  [place]   Does the patient have one of the following disease processes/diagnoses(primary or secondary)?  Other   Week 3 attempt successful?  No   Unsuccessful attempts  Attempt 2          Tawnya Aguayo RN

## 2021-03-30 ENCOUNTER — OFFICE VISIT (OUTPATIENT)
Dept: FAMILY MEDICINE CLINIC | Age: 66
End: 2021-03-30
Payer: MEDICARE

## 2021-03-30 VITALS
DIASTOLIC BLOOD PRESSURE: 80 MMHG | OXYGEN SATURATION: 98 % | SYSTOLIC BLOOD PRESSURE: 126 MMHG | HEART RATE: 82 BPM | TEMPERATURE: 97.6 F | RESPIRATION RATE: 20 BRPM | BODY MASS INDEX: 29.45 KG/M2 | WEIGHT: 161 LBS

## 2021-03-30 DIAGNOSIS — N76.4 LABIAL ABSCESS: Primary | ICD-10-CM

## 2021-03-30 PROCEDURE — 99213 OFFICE O/P EST LOW 20 MIN: CPT | Performed by: NURSE PRACTITIONER

## 2021-03-30 RX ORDER — CLINDAMYCIN HYDROCHLORIDE 300 MG/1
300 CAPSULE ORAL 4 TIMES DAILY
Qty: 40 CAPSULE | Refills: 0 | Status: SHIPPED | OUTPATIENT
Start: 2021-03-30 | End: 2021-04-09

## 2021-03-30 NOTE — PROGRESS NOTES
clindamSUBJECTIVE:  Cyst in groin. Patient ID: Malick Johnson is a 63 y.o. female. HPI:   HPI   Started 2 days ago size of her thumb and yellow green blood drainage. Has decreased in size with drainage. She states she has been using warm compresses to the area she has been unable to get it in and out of the bathtub due to her previous strokes the area that she is talking about is on the labia area on the patient's right side as it is probably the size of a about a centimeter it is softer than I thought she felt like it was deeper but itched, red it is no longer draining currently she said it has been draining I did try and express anything but it was mostly blood that I expressed work on a try and just do antibiotic clindamycin and see if that does not completely take care of it if it does not she is to return to the clinic  Past Medical History:   Diagnosis Date    Anxiety     Arthritis     Bilateral cataracts     Chronic kidney disease     [facility]     Diabetes mellitus [facility]     Diabetes mellitus [facility]     Hyperlipidemia     Cholesterol management per pcp.  Hypertension     IBS (irritable bowel syndrome)     Neuropathy     Pericardial effusion     Peripheral vascular disease (HCC)     Smoker     Type 2 diabetes mellitus without complication ([facility]       Prior to Visit Medications    Medication Sig Taking?  Authorizing Provider   clindamycin (CLEOCIN) 300 MG capsule Take 1 capsule by mouth 4 times daily for 10 days Yes MINDI Fisher   ALPRAZolam Mely Alar) 1 MG tablet 1/2 to 1 tablet every 8 hours as needed for anxiety Yes Ragini Olvera MD   aspirin (KSENIA ASPIRIN) 325 MG tablet Take 1 tablet by mouth daily Yes Ragini Olvera MD   clindamycin (CLEOCIN) 300 MG capsule Take 300 mg by mouth 3 times daily Yes Historical Provider, MD   cetirizine (ZYRTEC) 10 MG tablet Take 1 tablet by mouth 1 time daily Yes Ragini Olvera MD   escitalopram (LEXAPRO) 20 MG tablet Take 1 tablet by mouth 1 time daily Yes Hyun Abraham MD   esomeprazole (NEXIUM) 40 MG delayed release capsule TAKE 1 CAPSULE BY MOUTH EVERY Reza Andes Yes Hyun Abraham MD   fluticasone (FLONASE) 50 MCG/ACT nasal spray USE 2 SPRAYS IN EACH NOSTRIL DAILY Yes Hyun Abraham MD   levothyroxine (SYNTHROID) 25 MCG tablet TAKE 1 TABLET BY MOUTH 1 TIME DAILY Yes Hyun Abraham MD   lisinopril (PRINIVIL;ZESTRIL) 40 MG tablet TAKE 1 TABLET BY MOUTH EVERY DAY Yes Hyun Abraham MD   atorvastatin (LIPITOR) 40 MG tablet TAKE 1 TABLET BY MOUTH 1 TIME DAILY Yes Hyun Abraham MD   albuterol sulfate  (90 Base) MCG/ACT inhaler INHALE 2 PUFFS BY MOUTH EVERY 6 HOURS AS NEEDED Yes Hyun Abraham MD   HUMALOG KWIKPEN 100 UNIT/ML SOPN INJECT 40 UNITS UNDER THE SKIN THREE TIMES A DAY BEFORE MEALS Yes Hyun Abraham MD   furosemide (LASIX) 40 MG tablet TAKE 1 TABLET BY MOUTH TWO TIMES A DAY Yes Hyun Abraham MD   Lancets MISC Test up to 3 times daily. Yes Hyun Abraham MD   Insulin Pen Needle (PEN NEEDLES) 31G X 6 MM MISC 1 each by Does not apply route daily Yes Hyun Abraham MD   blood glucose monitor strips Test up to 3 times a day & as needed for symptoms of irregular blood glucose. Yes Hyun Abraham MD   gabapentin (NEURONTIN) 400 MG capsule Take 2 capsules by mouth 3 times daily. Yes Hyun Abraham MD   NOVOLOG FLEXPEN 100 UNIT/ML injection pen INJECT UNDER THE SKIN 40 UNITS THREE TIMES A DAY BEFORE MEALS Yes Nikita Wheat DO   TRESIBA FLEXTOUCH 200 UNIT/ML SOPN INJECT 100 UNITS SUBQ IN THE EVENING Yes Nikita Wheat DO   Diabetic Shoe MISC by Does not apply route I pair of shoes with 3 pair of heat molded inserts.  Yes Nikita Wheat, DO   fluticasone-salmeterol (ADVAIR DISKUS) 250-50 MCG/DOSE AEPB Inhale 1 puff into the lungs every 12 hours Yes Nikita Wheat DO   Insulin Degludec (TRESIBA FLEXTOUCH) 200 UNIT/ML SOPN Inject 100 Units into the skin nightly Yes Nikita Wheat, DO   triamcinolone Adult)   Pulse 82   Temp 97.6 °F (36.4 °C) (Temporal)   Resp 20   Wt 161 lb (73 kg)   SpO2 98%   BMI 29.45 kg/m²      ASSESSMENT:      ICD-10-CM    1. Labial abscess  N76.4 clindamycin (CLEOCIN) 300 MG capsule     Area cleaned with betadine blood expressed   PLAN:    Drhuv Maddox: Cyst (cyst in vaginal area . )  RTC for no improvement     Diagnosis and orders for this visit are above.

## 2021-04-30 RX ORDER — DULAGLUTIDE 0.75 MG/.5ML
INJECTION, SOLUTION SUBCUTANEOUS
Qty: 1 ML | Refills: 4 | OUTPATIENT
Start: 2021-04-30

## 2021-05-03 ENCOUNTER — OFFICE VISIT (OUTPATIENT)
Dept: FAMILY MEDICINE CLINIC | Age: 66
End: 2021-05-03
Payer: MEDICARE

## 2021-05-03 VITALS
WEIGHT: 159 LBS | TEMPERATURE: 96.5 F | BODY MASS INDEX: 29.26 KG/M2 | SYSTOLIC BLOOD PRESSURE: 132 MMHG | HEIGHT: 62 IN | HEART RATE: 78 BPM | OXYGEN SATURATION: 97 % | RESPIRATION RATE: 16 BRPM | DIASTOLIC BLOOD PRESSURE: 78 MMHG

## 2021-05-03 DIAGNOSIS — Z79.4 TYPE 2 DIABETES MELLITUS WITH HYPERGLYCEMIA, WITH LONG-TERM CURRENT USE OF INSULIN (HCC): Primary | ICD-10-CM

## 2021-05-03 DIAGNOSIS — Z79.4 TYPE 2 DIABETES MELLITUS WITH HYPERGLYCEMIA, WITH LONG-TERM CURRENT USE OF INSULIN (HCC): ICD-10-CM

## 2021-05-03 DIAGNOSIS — E03.9 ACQUIRED HYPOTHYROIDISM: ICD-10-CM

## 2021-05-03 DIAGNOSIS — I10 ESSENTIAL HYPERTENSION: ICD-10-CM

## 2021-05-03 DIAGNOSIS — E78.2 MIXED HYPERLIPIDEMIA: ICD-10-CM

## 2021-05-03 DIAGNOSIS — E11.65 TYPE 2 DIABETES MELLITUS WITH HYPERGLYCEMIA, WITH LONG-TERM CURRENT USE OF INSULIN (HCC): ICD-10-CM

## 2021-05-03 DIAGNOSIS — K21.9 GASTROESOPHAGEAL REFLUX DISEASE, UNSPECIFIED WHETHER ESOPHAGITIS PRESENT: ICD-10-CM

## 2021-05-03 DIAGNOSIS — E87.5 HYPERKALEMIA: ICD-10-CM

## 2021-05-03 DIAGNOSIS — E11.65 TYPE 2 DIABETES MELLITUS WITH HYPERGLYCEMIA, WITH LONG-TERM CURRENT USE OF INSULIN (HCC): Primary | ICD-10-CM

## 2021-05-03 LAB
ALBUMIN SERPL-MCNC: 3.2 G/DL (ref 3.5–5.2)
ALP BLD-CCNC: 116 U/L (ref 35–104)
ALT SERPL-CCNC: 9 U/L (ref 5–33)
ANION GAP SERPL CALCULATED.3IONS-SCNC: 13 MMOL/L (ref 7–19)
AST SERPL-CCNC: 18 U/L (ref 5–32)
BASOPHILS ABSOLUTE: 0 K/UL (ref 0–0.2)
BASOPHILS RELATIVE PERCENT: 0.3 % (ref 0–1)
BILIRUB SERPL-MCNC: <0.2 MG/DL (ref 0.2–1.2)
BUN BLDV-MCNC: 17 MG/DL (ref 8–23)
CALCIUM SERPL-MCNC: 9.6 MG/DL (ref 8.8–10.2)
CHLORIDE BLD-SCNC: 96 MMOL/L (ref 98–111)
CHOLESTEROL, TOTAL: 228 MG/DL (ref 160–199)
CO2: 21 MMOL/L (ref 22–29)
CREAT SERPL-MCNC: 1 MG/DL (ref 0.5–0.9)
CREATININE URINE: 125.7 MG/DL (ref 4.2–622)
EOSINOPHILS ABSOLUTE: 0 K/UL (ref 0–0.6)
EOSINOPHILS RELATIVE PERCENT: 0.5 % (ref 0–5)
GFR AFRICAN AMERICAN: >59
GFR NON-AFRICAN AMERICAN: 56
GLUCOSE BLD-MCNC: 292 MG/DL (ref 74–109)
HBA1C MFR BLD: 9.3 % (ref 4–6)
HCT VFR BLD CALC: 35 % (ref 37–47)
HDLC SERPL-MCNC: 57 MG/DL (ref 65–121)
HEMOGLOBIN: 11.4 G/DL (ref 12–16)
IMMATURE GRANULOCYTES #: 0 K/UL
LDL CHOLESTEROL CALCULATED: 149 MG/DL
LYMPHOCYTES ABSOLUTE: 1.8 K/UL (ref 1.1–4.5)
LYMPHOCYTES RELATIVE PERCENT: 24.7 % (ref 20–40)
MCH RBC QN AUTO: 29.3 PG (ref 27–31)
MCHC RBC AUTO-ENTMCNC: 32.6 G/DL (ref 33–37)
MCV RBC AUTO: 90 FL (ref 81–99)
MICROALBUMIN UR-MCNC: 203.2 MG/DL (ref 0–19)
MICROALBUMIN/CREAT UR-RTO: 1616.5 MG/G
MONOCYTES ABSOLUTE: 0.5 K/UL (ref 0–0.9)
MONOCYTES RELATIVE PERCENT: 6.7 % (ref 0–10)
NEUTROPHILS ABSOLUTE: 5.1 K/UL (ref 1.5–7.5)
NEUTROPHILS RELATIVE PERCENT: 67.7 % (ref 50–65)
PDW BLD-RTO: 12.6 % (ref 11.5–14.5)
PLATELET # BLD: 220 K/UL (ref 130–400)
PMV BLD AUTO: 12 FL (ref 9.4–12.3)
POTASSIUM SERPL-SCNC: 5.8 MMOL/L (ref 3.5–5)
RBC # BLD: 3.89 M/UL (ref 4.2–5.4)
SODIUM BLD-SCNC: 130 MMOL/L (ref 136–145)
T4 FREE: 0.96 NG/DL (ref 0.93–1.7)
TOTAL PROTEIN: 6.5 G/DL (ref 6.6–8.7)
TRIGL SERPL-MCNC: 111 MG/DL (ref 0–149)
TSH SERPL DL<=0.05 MIU/L-ACNC: 1.85 UIU/ML (ref 0.27–4.2)
WBC # BLD: 7.5 K/UL (ref 4.8–10.8)

## 2021-05-03 PROCEDURE — 99214 OFFICE O/P EST MOD 30 MIN: CPT | Performed by: FAMILY MEDICINE

## 2021-05-03 ASSESSMENT — PATIENT HEALTH QUESTIONNAIRE - PHQ9: SUM OF ALL RESPONSES TO PHQ QUESTIONS 1-9: 1

## 2021-05-03 NOTE — PROGRESS NOTES
[address] [facility]  [address] [address]  Dept: [phone]  Dept Fax: [phone]: [phone]     Visit type: Established patient    Reason for Visit: Fatigue      Assessment and Plan       1. Type 2 diabetes mellitus with hyperglycemia, with long-term current use of insulin (HCC)  -     CBC Auto Differential; Future  -     Comprehensive Metabolic Panel; Future  -     Hemoglobin A1C; Future  -     Microalbumin / Creatinine Urine Ratio; Future  -     TSH without Reflex; Future  -     T4, Free; Future  2. Acquired hypothyroidism  -     TSH without Reflex; Future  -     T4, Free; Future  3. Mixed hyperlipidemia  -     Lipid Panel; Future  4. Gastroesophageal reflux disease, unspecified whether esophagitis present  5. Hyperkalemia  -     sodium polystyrene (SPS) 15 GM/60ML suspension; Take 60 mLs by mouth daily for 2 days, Disp-120 mL, R-0Normal  6. Essential hypertension    Stressed the importance of maintaining follow-up with her local and local ENT for continued monitoring and management of her [address] gland tumor. Discussed concerns with her diabetes and complications from uncontrolled diabetes. Discussed her need to follow-up with her endocrinologist for continued evaluation and management. We will check her thyroid levels at this time to determine if there possibly contributing to her fatigue symptoms with adjustments as course dictates. We will continue with her other medications for comorbid medical conditions that she appears to be doing well with at this time. Discussed the importance of diet and dietary modifications that may beneficial for her medical conditions. Discussed the use of Kayexalate for her hyperkalemia and the need for continued monitoring moving forward. Discussed signs and require medical attention. All questions were answered and patient voiced understanding and agreement with plan as discussed.     Return if symptoms worsen or fail to improve, for next scheduled follow up with PCP. Subjective       HPI   Patient reports that she ended up seeing an ENT in [place] about her [address] gland tumor and they told her that they did not feel it safe to have surgery as she may not live through the surgery. She states that she still having problems with pain from the area and is still noticed some erythema and tenderness in the area but otherwise denies any other changes and problems with it at this time. She does have a history of type 2 diabetes that has been wildly uncontrolled and she has not been diligent with her medication. She has previously been following with endocrinology who has been managing her diabetes but she has not followed back up with them. She denies any new symptoms from her diabetes but is not been checking her blood sugar so does not really know how its been running. She does have a history of hypothyroidism and denies any symptoms or changes suggestive of her thyroid being off at this time but has had fatigue issues. She also has hyperlipidemia is tolerating Lipitor without any myalgias or GI upsets. She is not really being diligent with her diet nor is she exercising regularly. She does have gastroesophageal reflux disease and is benefiting from the use of Nexium. She denies any problems with the medicine or any recent changes to her symptoms. She has arterial hypertension has been doing well with her current medications. She denies any issues with the use of medicine or any recent changes to her symptoms. She is also been noted to be hyperkalemic but denies any symptoms that she is aware of from her high potassium levels at this time. Review of Systems   Constitutional: Negative for activity change, appetite change, fatigue and fever. HENT: Negative for congestion and rhinorrhea. Eyes: Negative for pain and discharge.    Respiratory: Negative for cough and shortness of breath. Cardiovascular: Negative for chest pain and palpitations. Gastrointestinal: Negative for abdominal pain, constipation, diarrhea, nausea and vomiting. Endocrine: Negative for cold intolerance and heat intolerance. Genitourinary: Negative for dysuria, frequency, hematuria and urgency. Musculoskeletal: Positive for arthralgias and back pain. Negative for gait problem and neck pain. Skin: Negative for rash and wound. Neurological: Negative for syncope and weakness. Hematological: Negative for adenopathy. Does not bruise/bleed easily. Psychiatric/Behavioral: Negative for dysphoric mood. The patient is nervous/anxious.          Allergies   Allergen Reactions    Ceftin [Cefuroxime Axetil] Swelling    Codeine Nausea Only    Dye [Iodides] Swelling and Other (See Comments)     SOB    Iodinated Diagnostic Agents        Outpatient Medications Prior to Visit   Medication Sig Dispense Refill    ALPRAZolam (XANAX) 1 MG tablet 1/2 to 1 tablet every 8 hours as needed for anxiety 75 tablet 2    aspirin (KSENIA ASPIRIN) 325 MG tablet Take 1 tablet by mouth daily 30 tablet 3    clindamycin (CLEOCIN) 300 MG capsule Take 300 mg by mouth 3 times daily      cetirizine (ZYRTEC) 10 MG tablet Take 1 tablet by mouth 1 time daily 90 tablet 1    escitalopram (LEXAPRO) 20 MG tablet Take 1 tablet by mouth 1 time daily 90 tablet 1    esomeprazole (NEXIUM) 40 MG delayed release capsule TAKE 1 CAPSULE BY MOUTH EVERY MORNING BEFORE BREAKFAST 90 capsule 1    fluticasone (FLONASE) 50 MCG/ACT nasal spray USE 2 SPRAYS IN EACH NOSTRIL DAILY 3 Bottle 1    levothyroxine (SYNTHROID) 25 MCG tablet TAKE 1 TABLET BY MOUTH 1 TIME DAILY 90 tablet 1    lisinopril (PRINIVIL;ZESTRIL) 40 MG tablet TAKE 1 TABLET BY MOUTH EVERY DAY 90 tablet 1    atorvastatin (LIPITOR) 40 MG tablet TAKE 1 TABLET BY MOUTH 1 TIME DAILY 90 tablet 1    albuterol sulfate  (90 Base) MCG/ACT inhaler INHALE 2 PUFFS BY MOUTH EVERY 6 HOURS AS NEEDED Smoker     Packs/day: 1.00     Years: 40.00     Pack years: 40.00     Types: Cigarettes    Smokeless tobacco: Never Used    Tobacco comment: pt is trying to quit smoking   Substance Use Topics    Alcohol use: No        Past Surgical History:   Procedure Laterality Date    APPENDECTOMY      CHOLECYSTECTOMY      COLONOSCOPY      ENDOSCOPY, COLON, DIAGNOSTIC      FINGER SURGERY      HYSTERECTOMY, TOTAL ABDOMINAL         Family History   Problem Relation Age of Onset    Cancer Mother     Cancer Father     Heart Failure Other     Heart Failure Maternal Grandfather        Objective       /78 (Site: Left Upper Arm, Position: Sitting, Cuff Size: Medium Adult)   Pulse 78   Temp 96.5 °F (35.8 °C) (Oral)   Resp 16   Ht 5' 2\" (1.575 m)   Wt 159 lb (72.1 kg)   SpO2 97%   BMI 29.08 kg/m²   Physical Exam  Vitals and nursing note reviewed. Constitutional:       General: She is not in acute distress. Appearance: She is well-developed. She is not diaphoretic. HENT:      Head: Normocephalic and atraumatic. Right Ear: External ear normal.      Left Ear: External ear normal.      Mouth/Throat:      Comments: Mask in place  Eyes:      General: No scleral icterus. Right eye: No discharge. Left eye: No discharge. Conjunctiva/sclera: Conjunctivae normal.   Neck:      Thyroid: No thyromegaly. Trachea: No tracheal deviation. Cardiovascular:      Rate and Rhythm: Normal rate and regular rhythm. Heart sounds: Normal heart sounds. No friction rub. No gallop. Pulmonary:      Effort: Pulmonary effort is normal. No respiratory distress. Breath sounds: Normal breath sounds. No wheezing or rales. Abdominal:      General: Bowel sounds are normal. There is no distension. Palpations: Abdomen is soft. Tenderness: There is no abdominal tenderness. Musculoskeletal:         General: No deformity (No gross deformities of upper or lower extremities).       Cervical back: Neck supple. Right lower leg: No edema. Left lower leg: No edema. Lymphadenopathy:      Cervical: No cervical adenopathy. Skin:     General: Skin is warm and dry. Findings: No erythema or rash. Neurological:      Mental Status: She is alert and oriented to person, place, and time. Cranial Nerves: No cranial nerve deficit. Psychiatric:         Behavior: Behavior normal.         Thought Content:  Thought content normal.           Data Reviewed and Summarized       Labs:     Imaging/Testing:        Cornel Novak MD

## 2021-05-03 NOTE — PATIENT INSTRUCTIONS
Patient Education        Learning About Carbohydrate (Carb) Counting and Eating Out When You Have Diabetes  Why plan your meals? Meal planning can be a key part of managing diabetes. Planning meals and snacks with the right balance of carbohydrate, protein, and fat can help you keep your blood sugar at the target level you set with your doctor. You don't have to eat special foods. You can eat what your family eats, including sweets once in a while. But you do have to pay attention to how often you eat and how much you eat of certain foods. You may want to work with a dietitian or a certified diabetes educator. He or she can give you tips and meal ideas and can answer your questions about meal planning. This health professional can also help you reach a healthy weight if that is one of your goals. What should you know about eating carbs? Managing the amount of carbohydrate (carbs) you eat is an important part of healthy meals when you have diabetes. Carbohydrate is found in many foods. · Learn which foods have carbs. And learn the amounts of carbs in different foods. ? Bread, cereal, pasta, and rice have about 15 grams of carbs in a serving. A serving is 1 slice of bread (1 ounce), ½ cup of cooked cereal, or 1/3 cup of cooked pasta or rice. ? Fruits have 15 grams of carbs in a serving. A serving is 1 small fresh fruit, such as an apple or orange; ½ of a banana; ½ cup of cooked or canned fruit; ½ cup of fruit juice; 1 cup of melon or raspberries; or 2 tablespoons of dried fruit. ? Milk and no-sugar-added yogurt have 15 grams of carbs in a serving. A serving is 1 cup of milk or 2/3 cup of no-sugar-added yogurt. ? Starchy vegetables have 15 grams of carbs in a serving. A serving is ½ cup of mashed potatoes or sweet potato; 1 cup winter squash; ½ of a small baked potato; ½ cup of cooked beans; or ½ cup cooked corn or green peas.   · Learn how much carbs to eat each day and at each meal. A dietitian or CDE can teach you how to keep track of the amount of carbs you eat. This is called carbohydrate counting. · If you are not sure how to count carbohydrate grams, use the Plate Method to plan meals. It is a good, quick way to make sure that you have a balanced meal. It also helps you spread carbs throughout the day. ? Divide your plate by types of foods. Put non-starchy vegetables on half the plate, meat or other protein food on one-quarter of the plate, and a grain or starchy vegetable in the final quarter of the plate. To this you can add a small piece of fruit and 1 cup of milk or yogurt, depending on how many carbs you are supposed to eat at a meal.  · Try to eat about the same amount of carbs at each meal. Do not \"save up\" your daily allowance of carbs to eat at one meal.  · Proteins have very little or no carbs per serving. Examples of proteins are beef, chicken, turkey, fish, eggs, tofu, cheese, cottage cheese, and peanut butter. A serving size of meat is 3 ounces, which is about the size of a deck of cards. Examples of meat substitute serving sizes (equal to 1 ounce of meat) are 1/4 cup of cottage cheese, 1 egg, 1 tablespoon of peanut butter, and ½ cup of tofu. How can you eat out and still eat healthy? · Learn to estimate the serving sizes of foods that have carbohydrate. If you measure food at home, it will be easier to estimate the amount in a serving of restaurant food. · If the meal you order has too much carbohydrate (such as potatoes, corn, or baked beans), ask to have a low-carbohydrate food instead. Ask for a salad or green vegetables. · If you use insulin, check your blood sugar before and after eating out to help you plan how much to eat in the future. · If you eat more carbohydrate at a meal than you had planned, take a walk or do other exercise. This will help lower your blood sugar. What are some tips for eating healthy? · Limit saturated fat, such as the fat from meat and dairy products. This is a healthy choice because people who have diabetes are at higher risk of heart disease. So choose lean cuts of meat and nonfat or low-fat dairy products. Use olive or canola oil instead of butter or shortening when cooking. · Don't skip meals. Your blood sugar may drop too low if you skip meals and take insulin or certain medicines for diabetes. · Check with your doctor before you drink alcohol. Alcohol can cause your blood sugar to drop too low. Alcohol can also cause a bad reaction if you take certain diabetes medicines. Follow-up care is a key part of your treatment and safety. Be sure to make and go to all appointments, and call your doctor if you are having problems. It's also a good idea to know your test results and keep a list of the medicines you take. Where can you learn more? Go to https://Quincuskerryeb.CasterStats. org and sign in to your [employer] digital account. Enter A021 in the [employer] box to learn more about \"Learning About Carbohydrate (Carb) Counting and Eating Out When You Have Diabetes. \"     If you do not have an account, please click on the \"Sign Up Now\" link. Current as of: August 31, 2020               Content Version: 12.8  © 2006-2021 Healthwise, Incorporated. Care instructions adapted under license by [facility] ([facility]). If you have questions about a medical condition or this instruction, always ask your healthcare professional. [place] [address] any warranty or liability for your use of this information.

## 2021-05-04 ENCOUNTER — TELEPHONE (OUTPATIENT)
Dept: FAMILY MEDICINE CLINIC | Age: 66
End: 2021-05-04

## 2021-05-04 RX ORDER — SODIUM POLYSTYRENE SULFONATE 15 G/60ML
15 SUSPENSION ORAL; RECTAL DAILY
Qty: 120 ML | Refills: 0 | Status: SHIPPED | OUTPATIENT
Start: 2021-05-04 | End: 2021-12-13 | Stop reason: SDUPTHER

## 2021-05-04 NOTE — TELEPHONE ENCOUNTER
Unable to reach anyone at Dr Pushpa Suazo.  Left message requesting a return call to schedule patient an appointment

## 2021-05-05 ENCOUNTER — TELEPHONE (OUTPATIENT)
Dept: FAMILY MEDICINE CLINIC | Age: 66
End: 2021-05-05

## 2021-05-05 DIAGNOSIS — E87.5 HIGH POTASSIUM: Primary | ICD-10-CM

## 2021-05-05 NOTE — TELEPHONE ENCOUNTER
Patient is aware of her results and voiced understanding. She will go  the medication, and avoid high potassium foods. She is scheduled with Endocrinology on 5/6/ @ 2p. She said she will be there. She is aware to get potassium rechecked next week.        Will fax updated lab work to Dr Hugh Santillan today so he has it available for the appt tomorrow

## 2021-05-05 NOTE — TELEPHONE ENCOUNTER
----- Message from Perfecto Calderon MD sent at 5/4/2021  5:05 PM CDT -----  Please inform patient that her cholesterol and bad cholesterol are elevated from where it was last time we checked it however her triglycerides were somewhat improved. Would recommend continue with her Lipitor and trying to be diligent with her diet and exercise. Her potassium was quite high as well as her blood sugar and her kidney function is down a little bit which we will need to continue to monitor but I have sent in a medicine that may cause her to have some diarrhea that should help bring down the potassium level but we want to recheck the potassium in few days to early next week to determine if any additional doses are needed. Would recommend avoiding high potassium foods and any potassium supplements that she might be taking. She is spilling more protein in her urine which will need to continue to monitor and her hemoglobin A1c is up quite a bit from the last time we checked it and is now 9.3. She needs to make sure that she keeps her appointment with Dr. Radha Parker, her endocrinologist who is managing her diabetes and if she can get back into see him in a timely fashion then we may need to make some adjustments to the insulin regiment until she is able to follow back up with him. I would recommend keeping a blood sugar log and taking it to her next appointment.

## 2021-05-06 ENCOUNTER — OFFICE VISIT (OUTPATIENT)
Dept: ENDOCRINOLOGY | Facility: CLINIC | Age: 66
End: 2021-05-06

## 2021-05-06 VITALS
SYSTOLIC BLOOD PRESSURE: 134 MMHG | OXYGEN SATURATION: 99 % | WEIGHT: 161.3 LBS | HEIGHT: 62 IN | BODY MASS INDEX: 29.68 KG/M2 | DIASTOLIC BLOOD PRESSURE: 90 MMHG | HEART RATE: 88 BPM

## 2021-05-06 DIAGNOSIS — Z79.4 TYPE 2 DIABETES MELLITUS WITH HYPERGLYCEMIA, WITH LONG-TERM CURRENT USE OF INSULIN (HCC): Primary | ICD-10-CM

## 2021-05-06 DIAGNOSIS — E11.65 TYPE 2 DIABETES MELLITUS WITH HYPERGLYCEMIA, WITH LONG-TERM CURRENT USE OF INSULIN (HCC): Primary | ICD-10-CM

## 2021-05-06 PROBLEM — IMO0002 UNCONTROLLED TYPE 2 DIABETES MELLITUS, WITHOUT LONG-TERM CURRENT USE OF INSULIN: Status: RESOLVED | Noted: 2017-07-17 | Resolved: 2021-05-06

## 2021-05-06 PROCEDURE — 99214 OFFICE O/P EST MOD 30 MIN: CPT | Performed by: INTERNAL MEDICINE

## 2021-05-06 RX ORDER — INSULIN PUMP CONTROLLER
1 EACH MISCELLANEOUS
Qty: 30 EACH | Refills: 11 | Status: SHIPPED | OUTPATIENT
Start: 2021-05-06

## 2021-05-06 RX ORDER — PROCHLORPERAZINE 25 MG/1
1 SUPPOSITORY RECTAL ONCE
Qty: 1 EACH | Refills: 1 | Status: SHIPPED | OUTPATIENT
Start: 2021-05-06 | End: 2021-09-02

## 2021-05-06 RX ORDER — PEN NEEDLE, DIABETIC 30 GX3/16"
1 NEEDLE, DISPOSABLE MISCELLANEOUS 4 TIMES DAILY
Qty: 120 EACH | Refills: 11 | Status: SHIPPED | OUTPATIENT
Start: 2021-05-06

## 2021-05-06 RX ORDER — INSULIN ASPART 100 [IU]/ML
INJECTION, SOLUTION INTRAVENOUS; SUBCUTANEOUS
Qty: 6 PEN | Refills: 11 | Status: SHIPPED | OUTPATIENT
Start: 2021-05-06 | End: 2021-11-01

## 2021-05-06 RX ORDER — PROCHLORPERAZINE 25 MG/1
SUPPOSITORY RECTAL AS NEEDED
Qty: 9 EACH | Refills: 3 | Status: SHIPPED | OUTPATIENT
Start: 2021-05-06

## 2021-05-06 RX ORDER — INSULIN DEGLUDEC INJECTION 100 U/ML
40 INJECTION, SOLUTION SUBCUTANEOUS NIGHTLY
Qty: 4 PEN | Refills: 11 | Status: SHIPPED | OUTPATIENT
Start: 2021-05-06

## 2021-05-06 RX ORDER — PROCHLORPERAZINE 25 MG/1
1 SUPPOSITORY RECTAL ONCE
Qty: 1 EACH | Refills: 3 | Status: SHIPPED | OUTPATIENT
Start: 2021-05-06 | End: 2021-05-06

## 2021-05-06 NOTE — PROGRESS NOTES
Yolanda Mendenhall is a 65 y.o.. female seen by diabetes educator on 05/06/2021 for review of medications and carbohydrate counting education.     1. Carbohydrate Counting/ Exchange Choices and Healthy Foods   I. Reviewed carbohydrate-containing foods, standard serving sizes, how to measure foods, and reading nutrition labels.   II. Provided patient with carbohydrate counting booklet (Carb counting and meal planning book).   III. Provided patient with list of non-starchy vegetables and foods that are low in carbohydrate for snacks and to incorporate with meals.     IV. Instructed patient to eat 45-60 grams of carbohydrate with each meal (3-4 exchange choices) and 15-30 grams of carbohydrate for snacks (1-2 exchange choices).    V. Reviewed the difference between simple and complex carbohydrate. Encouraged patient to choose complex carbohydrates more often.     VI. Choose fruits, vegetables, whole grains, legumes, low-fat milk, fiber-rich foods, minimal saturated fats, and watch cholesterol and sodium intake.    VII. Patient demonstrated understanding by correctly identifying and calculating carbohydrate amounts for various meals.    2. Humalog Dosing   I. 3 units of Humalog for every 15 grams of carbohydrate eaten plus 2 units per 50 mg/dl above 150 sliding scale   II. Patient demonstrated understanding by calculating correct dosage for example meals and blood sugars    3. Tresiba Dosing   I. 40 units daily, same time of the day.        4. Provider will send Dexcom to pharmacy.     5. Omnipod order and notes faxed to [facility].     Follow up per Dr. Aubrey Jones. Provided contact information.   MONTANA DavisN, RN, [facility]  Diabetes Educator

## 2021-05-06 NOTE — PROGRESS NOTES
Subjective    Yolanda Mendenhall is a 65 y.o. female. she is here today for follow-up of diabetes    HPI     Ms. 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  Needs repeat but glucose has to improve     Physical Exam  AOx3  Cervical neck scar , right   RRR  CTA  No edema  Using cane     Labs    Lab Results   Component Value Date    GLUCOSE 292 (H) 05/03/2021    BUN 17 05/03/2021    CREATININE 1 (H) 05/03/2021    EGFRIFNONA 56 (A) 05/03/2021    EGFRIFAFRI >59 05/03/2021    BCR 20.7 02/21/2021    K 5.8 (H) 05/03/2021    CO2 21 (L) 05/03/2021    CALCIUM 9.6 05/03/2021    ALBUMIN 3.2 (L) 05/03/2021    AST 18 05/03/2021    ALT 9 05/03/2021     Lab Results   Component Value Date    WBC 7.5 05/03/2021    HGB 11.4 (L) 05/03/2021    HCT 35.0 (L) 05/03/2021    MCV 90.0 05/03/2021     05/03/2021         Assessment/Plan      1. Type 2 diabetes mellitus with hyperglycemia, with long-term current use of insulin (CMS/[facility])    .    Glycemic Management    Lab Results   Component Value Date    HGBA1C 9.3 (H) 05/03/2021    HGBA1C 10.0 (H) 09/18/2019    HGBA1C 11.9 (H) 10/12/2018     Lab Results   Component Value Date    CREATININE 1 (H) 05/03/2021       Side effects to metformin and jardiance         trulicity 0.75 mg weekly - ran out , will restart             Tresiba -- taking 100 units - decrease to 40              Humalog ,20 rx but missing due to lows  Change to 1 : 5  And 2:50           Approve For Dexcom and Omnipod     The patient has diabetes mellitus, insulin-dependent.    Our Diabetes Department has evaluated the patient in the last six months and will continue counseling on insulin adjustment.     The patient performs blood glucose testing at least four times daily with proven glucose variability from 50 to 300 mg per dl.    The patient is administering basal insulin and prandial insulin four times per day for more than six months.    The  patient uses a home blood glucose monitor to assess blood glucose at least four times daily for more than six months.    The patient requires frequent adjustment of insulin treatment regimen based on blood glucose readings.    The patient has frequent variability in blood glucose readings due to activity and variability in meal content and time.     The patient has completed a diabetes education program with us.    The patient has demonstrated the ability to self-monitor her glucose.     The patient is motivated in improving diabetes control     The patient has hypoglycemia unawareness      .

## 2021-05-10 DIAGNOSIS — E87.5 HIGH POTASSIUM: ICD-10-CM

## 2021-05-10 LAB
ALBUMIN SERPL-MCNC: 3.1 G/DL (ref 3.5–5.2)
ALP BLD-CCNC: 110 U/L (ref 35–104)
ALT SERPL-CCNC: 11 U/L (ref 5–33)
ANION GAP SERPL CALCULATED.3IONS-SCNC: 10 MMOL/L (ref 7–19)
AST SERPL-CCNC: 13 U/L (ref 5–32)
BILIRUB SERPL-MCNC: 0.3 MG/DL (ref 0.2–1.2)
BUN BLDV-MCNC: 15 MG/DL (ref 8–23)
CALCIUM SERPL-MCNC: 9.4 MG/DL (ref 8.8–10.2)
CHLORIDE BLD-SCNC: 97 MMOL/L (ref 98–111)
CO2: 26 MMOL/L (ref 22–29)
CREAT SERPL-MCNC: 1 MG/DL (ref 0.5–0.9)
GFR AFRICAN AMERICAN: >59
GFR NON-AFRICAN AMERICAN: 56
GLUCOSE BLD-MCNC: 375 MG/DL (ref 74–109)
POTASSIUM SERPL-SCNC: 4.2 MMOL/L (ref 3.5–5)
SODIUM BLD-SCNC: 133 MMOL/L (ref 136–145)
TOTAL PROTEIN: 6.4 G/DL (ref 6.6–8.7)

## 2021-05-11 ENCOUNTER — TELEPHONE (OUTPATIENT)
Dept: ENDOCRINOLOGY | Facility: CLINIC | Age: 66
End: 2021-05-11

## 2021-05-11 NOTE — TELEPHONE ENCOUNTER
Spoke with Surya, pharmacist. His systems are down right now and he is unable to do anything currently. He stated he can bill under [place] Medicaid. Dexcom G6 is now formulary so that will work! Faxed documentation over for when systems get up and running.     Called pt - no answer - no voicemail set up.

## 2021-05-11 NOTE — TELEPHONE ENCOUNTER
Pt is confused about what is going on with her machine she is supposed to be getting, she is requesting to speak with Nedra, I believe.

## 2021-05-13 ENCOUNTER — TELEPHONE (OUTPATIENT)
Dept: ENDOCRINOLOGY | Facility: CLINIC | Age: 66
End: 2021-05-13

## 2021-05-13 NOTE — TELEPHONE ENCOUNTER
Spoke with pt. Clarified insulin dosing and Dexcom. She will  in the morning. If she has more questions she will just call back.

## 2021-05-13 NOTE — TELEPHONE ENCOUNTER
Pt is needing a call to discuss her insulin before she picks this up. She would like a call before 9:30am if possible. She is afraid to pick it up if she does not need it, she says.

## 2021-05-17 ENCOUNTER — OFFICE VISIT (OUTPATIENT)
Dept: FAMILY MEDICINE CLINIC | Age: 66
End: 2021-05-17
Payer: MEDICARE

## 2021-05-17 VITALS
HEIGHT: 62 IN | HEART RATE: 68 BPM | BODY MASS INDEX: 29.08 KG/M2 | SYSTOLIC BLOOD PRESSURE: 172 MMHG | TEMPERATURE: 97.5 F | RESPIRATION RATE: 20 BRPM | OXYGEN SATURATION: 100 % | DIASTOLIC BLOOD PRESSURE: 94 MMHG | WEIGHT: 158 LBS

## 2021-05-17 DIAGNOSIS — N30.01 ACUTE CYSTITIS WITH HEMATURIA: Primary | ICD-10-CM

## 2021-05-17 DIAGNOSIS — R31.9 HEMATURIA, UNSPECIFIED TYPE: ICD-10-CM

## 2021-05-17 LAB
BACTERIA: ABNORMAL /HPF
BILIRUBIN URINE: NEGATIVE
BLOOD, URINE: ABNORMAL
CLARITY: ABNORMAL
COLOR: YELLOW
CRYSTALS, UA: ABNORMAL /HPF
EPITHELIAL CELLS, UA: 2 /HPF (ref 0–5)
GLUCOSE URINE: 500 MG/DL
HYALINE CASTS: 2 /HPF (ref 0–8)
KETONES, URINE: NEGATIVE MG/DL
LEUKOCYTE ESTERASE, URINE: ABNORMAL
NITRITE, URINE: NEGATIVE
PH UA: 6.5 (ref 5–8)
PROTEIN UA: >=300 MG/DL
RBC UA: 8 /HPF (ref 0–4)
SPECIFIC GRAVITY UA: 1.02 (ref 1–1.03)
URINE REFLEX TO CULTURE: YES
URINE TYPE: ABNORMAL
UROBILINOGEN, URINE: 0.2 E.U./DL
WBC UA: >900 /HPF (ref 0–5)

## 2021-05-17 PROCEDURE — 99213 OFFICE O/P EST LOW 20 MIN: CPT | Performed by: FAMILY MEDICINE

## 2021-05-17 RX ORDER — SULFAMETHOXAZOLE AND TRIMETHOPRIM 800; 160 MG/1; MG/1
1 TABLET ORAL 2 TIMES DAILY
Qty: 14 TABLET | Refills: 0 | Status: SHIPPED | OUTPATIENT
Start: 2021-05-17 | End: 2021-05-24

## 2021-05-17 ASSESSMENT — ENCOUNTER SYMPTOMS
VOMITING: 0
COUGH: 0
SHORTNESS OF BREATH: 0
NAUSEA: 0
CONSTIPATION: 0
SHORTNESS OF BREATH: 0
NAUSEA: 0
EYE DISCHARGE: 0
EYE DISCHARGE: 0
ABDOMINAL PAIN: 0
RHINORRHEA: 0
BACK PAIN: 1
BACK PAIN: 1
VOMITING: 0
RHINORRHEA: 0
EYE PAIN: 0
EYE PAIN: 0
DIARRHEA: 0
DIARRHEA: 0
COUGH: 0
CONSTIPATION: 0
ABDOMINAL PAIN: 0

## 2021-05-17 NOTE — PROGRESS NOTES
[address] [facility]  [address] [address]  Dept: [phone]  Dept Fax: [phone]: [phone]     Visit type: Established patient    Reason for Visit: Hematuria         Assessment and Plan       1. Acute cystitis with hematuria  -     sulfamethoxazole-trimethoprim (BACTRIM DS;SEPTRA DS) 800-160 MG per tablet; Take 1 tablet by mouth 2 times daily for 7 days, Disp-14 tablet, R-0Normal  2. Hematuria, unspecified type  -     Urinalysis Reflex to Culture    Discussed diagnosis, expected course, and proper use of medication, including OTC medications if prescription is too expensive or insurance does not cover. Stressed the importance of continued monitoring of her blood pressure at home and discussed that if her blood pressure is elevated/continues to be elevated that adjustments of her medication would be warranted. Discussed signs and symptoms requiring medical attention. All questions were answered and patient voiced understanding and agreement with plan as discussed. Return if symptoms worsen or fail to improve, for next scheduled follow up with PCP. Subjective       HPI   Patient reports that she has been having some issues with some dysuria and urinary urgency and frequency and has also noted some blood in her urine. She states that she has the same symptoms with urinary tract infections and believe that was going on at this time. It was noted that her blood pressure was elevated in office and she states that she is anxious. She reports that she did not take her anxiety medicine this morning as she knew that she was going to be driving and does not take the medicine even though she felt like she probably needed it when she is driving to be on the safe side. Review of Systems   Constitutional: Negative for activity change, appetite change, fatigue and fever. HENT: Negative for congestion and rhinorrhea.     Eyes: Negative for pain and discharge. Respiratory: Negative for cough and shortness of breath. Cardiovascular: Negative for chest pain and palpitations. Gastrointestinal: Negative for abdominal pain, constipation, diarrhea, nausea and vomiting. Endocrine: Negative for cold intolerance and heat intolerance. Genitourinary: Positive for dysuria, frequency and urgency. Negative for hematuria. Musculoskeletal: Positive for arthralgias and back pain. Negative for gait problem and neck pain. Skin: Negative for rash and wound. Neurological: Negative for syncope and weakness. Hematological: Negative for adenopathy. Does not bruise/bleed easily. Psychiatric/Behavioral: Negative for dysphoric mood. The patient is nervous/anxious.          Allergies   Allergen Reactions    Ceftin [Cefuroxime Axetil] Swelling    Codeine Nausea Only    Dye [Iodides] Swelling and Other (See Comments)     SOB    Iodinated Diagnostic Agents        Outpatient Medications Prior to Visit   Medication Sig Dispense Refill    sodium polystyrene (SPS) 15 GM/60ML suspension Take 60 mLs by mouth daily for 2 days 120 mL 0    ALPRAZolam (XANAX) 1 MG tablet 1/2 to 1 tablet every 8 hours as needed for anxiety 75 tablet 2    aspirin (KSENIA ASPIRIN) 325 MG tablet Take 1 tablet by mouth daily 30 tablet 3    clindamycin (CLEOCIN) 300 MG capsule Take 300 mg by mouth 3 times daily      cetirizine (ZYRTEC) 10 MG tablet Take 1 tablet by mouth 1 time daily 90 tablet 1    escitalopram (LEXAPRO) 20 MG tablet Take 1 tablet by mouth 1 time daily 90 tablet 1    esomeprazole (NEXIUM) 40 MG delayed release capsule TAKE 1 CAPSULE BY MOUTH EVERY MORNING BEFORE BREAKFAST 90 capsule 1    fluticasone (FLONASE) 50 MCG/ACT nasal spray USE 2 SPRAYS IN EACH NOSTRIL DAILY 3 Bottle 1    levothyroxine (SYNTHROID) 25 MCG tablet TAKE 1 TABLET BY MOUTH 1 TIME DAILY 90 tablet 1    lisinopril (PRINIVIL;ZESTRIL) 40 MG tablet TAKE 1 TABLET BY MOUTH EVERY DAY 90 tablet 1    atorvastatin (LIPITOR) 40 MG tablet TAKE 1 TABLET BY MOUTH 1 TIME DAILY 90 tablet 1    albuterol sulfate  (90 Base) MCG/ACT inhaler INHALE 2 PUFFS BY MOUTH EVERY 6 HOURS AS NEEDED 18 g 3    HUMALOG KWIKPEN 100 UNIT/ML SOPN INJECT 40 UNITS UNDER THE SKIN THREE TIMES A DAY BEFORE MEALS 15 mL 5    furosemide (LASIX) 40 MG tablet TAKE 1 TABLET BY MOUTH TWO TIMES A DAY 60 tablet 0    Lancets MISC Test up to 3 times daily. 100 each 3    Insulin Pen Needle (PEN NEEDLES) 31G X 6 MM MISC 1 each by Does not apply route daily 100 each 3    blood glucose monitor strips Test up to 3 times a day & as needed for symptoms of irregular blood glucose. 100 strip 5    gabapentin (NEURONTIN) 400 MG capsule Take 2 capsules by mouth 3 times daily. 180 capsule 1    NOVOLOG FLEXPEN 100 UNIT/ML injection pen INJECT UNDER THE SKIN 40 UNITS THREE TIMES A DAY BEFORE MEALS 15 mL 3    TRESIBA FLEXTOUCH 200 UNIT/ML SOPN INJECT 100 UNITS SUBQ IN THE EVENING 18 mL 5    Diabetic Shoe MISC by Does not apply route I pair of shoes with 3 pair of heat molded inserts. 1 each 0    fluticasone-salmeterol (ADVAIR DISKUS) 250-50 MCG/DOSE AEPB Inhale 1 puff into the lungs every 12 hours 60 each 5    Insulin Degludec (TRESIBA FLEXTOUCH) 200 UNIT/ML SOPN Inject 100 Units into the skin nightly 5 pen 5    triamcinolone (KENALOG) 0.1 % cream Apply topically 2 times daily. 80 g 5    albuterol (ACCUNEB) 0.63 MG/3ML nebulizer solution Take 3 mLs by nebulization every 6 hours as needed for Wheezing 270 mL 5     No facility-administered medications prior to visit. Past Medical History:   Diagnosis Date    Anxiety     Arthritis     Bilateral cataracts     Chronic kidney disease     [facility]     Diabetes mellitus [facility]     Diabetes mellitus ([facility]     Hyperlipidemia     Cholesterol management per pcp.      Hypertension     IBS (irritable bowel syndrome)     Neuropathy     Pericardial effusion  Peripheral vascular disease (HCC)     Smoker     Type 2 diabetes mellitus without complication (HCC)         Social History     Tobacco Use    Smoking status: Current Every Day Smoker     Packs/day: 1.00     Years: 40.00     Pack years: 40.00     Types: Cigarettes    Smokeless tobacco: Never Used    Tobacco comment: pt is trying to quit smoking   Substance Use Topics    Alcohol use: No        Past Surgical History:   Procedure Laterality Date    APPENDECTOMY      CHOLECYSTECTOMY      COLONOSCOPY      ENDOSCOPY, COLON, DIAGNOSTIC      FINGER SURGERY      HYSTERECTOMY, TOTAL ABDOMINAL         Family History   Problem Relation Age of Onset    Cancer Mother     Cancer Father     Heart Failure Other     Heart Failure Maternal Grandfather        Objective       BP (!) 172/94 (Site: Left Upper Arm, Position: Sitting, Cuff Size: Medium Adult)   Pulse 68   Temp 97.5 °F (36.4 °C) (Oral)   Resp 20   Ht 5' 2\" (1.575 m)   Wt 158 lb (71.7 kg)   SpO2 100%   BMI 28.90 kg/m²   Physical Exam  Vitals and nursing note reviewed. Constitutional:       General: She is not in acute distress. Appearance: She is well-developed. She is not diaphoretic. HENT:      Head: Normocephalic and atraumatic. Right Ear: External ear normal.      Left Ear: External ear normal.      Mouth/Throat:      Comments: Mask in place  Eyes:      General: No scleral icterus. Right eye: No discharge. Left eye: No discharge. Conjunctiva/sclera: Conjunctivae normal.   Neck:      Thyroid: No thyromegaly. Trachea: No tracheal deviation. Cardiovascular:      Rate and Rhythm: Normal rate and regular rhythm. Heart sounds: Normal heart sounds. No friction rub. No gallop. Pulmonary:      Effort: Pulmonary effort is normal. No respiratory distress. Breath sounds: Normal breath sounds. No wheezing or rales. Abdominal:      General: Bowel sounds are normal. There is no distension.       Palpations:

## 2021-05-18 DIAGNOSIS — N30.01 ACUTE CYSTITIS WITH HEMATURIA: Primary | ICD-10-CM

## 2021-05-18 RX ORDER — AMOXICILLIN 875 MG/1
875 TABLET, COATED ORAL 2 TIMES DAILY
Qty: 10 TABLET | Refills: 0 | Status: SHIPPED | OUTPATIENT
Start: 2021-05-18 | End: 2021-05-23

## 2021-05-19 ENCOUNTER — DOCUMENTATION (OUTPATIENT)
Dept: ENDOCRINOLOGY | Facility: CLINIC | Age: 66
End: 2021-05-19

## 2021-05-19 LAB
ORGANISM: ABNORMAL
ORGANISM: ABNORMAL
URINE CULTURE, ROUTINE: ABNORMAL

## 2021-05-19 NOTE — PROGRESS NOTES
PA FOR OMNIPOD 5 PACK 2 DAY CHANGE AND NOTES FAXED TO [facility]

## 2021-05-25 ENCOUNTER — TELEPHONE (OUTPATIENT)
Dept: ENDOCRINOLOGY | Facility: CLINIC | Age: 66
End: 2021-05-25

## 2021-05-25 NOTE — TELEPHONE ENCOUNTER
Pt says she just got a call stating that she is supposed to be on the Omnipod, she is wanting to discuss this and make sure she is understanding it all before she is put on it

## 2021-05-26 NOTE — TELEPHONE ENCOUNTER
Called pt - no answer - mailbox full and cannot accept any messages.     (Wanted to let pt know she will have omnipod training! She needs to contact [employer] to schedule training.)

## 2021-05-27 NOTE — TELEPHONE ENCOUNTER
"TRIED TO CALL - NO ANSWER - NO VM SET UP! ERIKA WITH OMNIPOD HAS TRIED TO CALL. He stated \"She has a $0 co-pay for a 90 day supply of Omnipod pods. She said she needed to speak to the doctors office before she would order.\"  Again, I tried to call. No answer.     "

## 2021-06-01 DIAGNOSIS — F41.9 ANXIETY: ICD-10-CM

## 2021-06-01 RX ORDER — ALPRAZOLAM 1 MG/1
TABLET ORAL
Qty: 75 TABLET | Refills: 2 | OUTPATIENT
Start: 2021-06-01

## 2021-06-03 ENCOUNTER — TRANSCRIBE ORDERS (OUTPATIENT)
Dept: ADMINISTRATIVE | Facility: HOSPITAL | Age: 66
End: 2021-06-03

## 2021-06-03 DIAGNOSIS — K11.4 SALIVARY GLAND FISTULA: Primary | ICD-10-CM

## 2021-06-15 ENCOUNTER — TELEMEDICINE (OUTPATIENT)
Dept: ENDOCRINOLOGY | Facility: CLINIC | Age: 66
End: 2021-06-15

## 2021-06-15 ENCOUNTER — HOSPITAL ENCOUNTER (OUTPATIENT)
Dept: CT IMAGING | Facility: HOSPITAL | Age: 66
Discharge: HOME | End: 2021-06-15
Admitting: OTOLARYNGOLOGY

## 2021-06-15 DIAGNOSIS — Z79.4 TYPE 2 DIABETES MELLITUS WITH HYPERGLYCEMIA, WITH LONG-TERM CURRENT USE OF INSULIN (HCC): Primary | ICD-10-CM

## 2021-06-15 DIAGNOSIS — K11.4 SALIVARY GLAND FISTULA: ICD-10-CM

## 2021-06-15 DIAGNOSIS — E11.65 TYPE 2 DIABETES MELLITUS WITH HYPERGLYCEMIA, WITH LONG-TERM CURRENT USE OF INSULIN (HCC): Primary | ICD-10-CM

## 2021-06-15 PROCEDURE — 70490 CT SOFT TISSUE NECK W/O DYE: CPT

## 2021-06-15 PROCEDURE — 99214 OFFICE O/P EST MOD 30 MIN: CPT | Performed by: INTERNAL MEDICINE

## 2021-06-15 NOTE — PROGRESS NOTES
Subjective    Yolanda Mendenhall is a 65 y.o. female. she is here today for follow-up of diabetes                                      This was a Telehealth Encounter. Benefits and Disadvantages of a Telehealth Visit were discussed and accepted by patient. .  Patient agreed to receive service through Telehealth visit as patient is being compliant with social distancing recommendations imparted by CDC.     You have chosen to receive care through a telehealth visit.  Do you consent to use a video/audio connection for your medical care today? Yes      HPI     Ms. 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  Needs repeat but glucose has to improve   It has improved     Has omnipod and dexcom but hasn't started    Physical Exam  AOx3  Cervical neck scar , right   RRR  CTA  No edema  Using cane     Labs    Lab Results   Component Value Date    GLUCOSE 375 (H) 05/10/2021    BUN 15 05/10/2021    CREATININE 1 (H) 05/10/2021    EGFRIFNONA 56 (A) 05/10/2021    EGFRIFAFRI >59 05/10/2021    BCR 20.7 02/21/2021    K 4.2 05/10/2021    CO2 26 05/10/2021    CALCIUM 9.4 05/10/2021    ALBUMIN 3.1 (L) 05/10/2021    AST 13 05/10/2021    ALT 11 05/10/2021     Lab Results   Component Value Date    WBC 7.5 05/03/2021    HGB 11.4 (L) 05/03/2021    HCT 35.0 (L) 05/03/2021    MCV 90.0 05/03/2021     05/03/2021         Assessment/Plan      1. Type 2 diabetes mellitus with hyperglycemia, with long-term current use of insulin (CMS/[facility])    .    Glycemic Management    Lab Results   Component Value Date    HGBA1C 9.3 (H) 05/03/2021    HGBA1C 10.0 (H) 09/18/2019    HGBA1C 11.9 (H) 10/12/2018     Lab Results   Component Value Date    CREATININE 1 (H) 05/10/2021       Side effects to metformin and jardiance         trulicity 0.75 mg weekly - able to tolerate but not higher doses-          Tresiba -- taking 100 units - decrease to 40 decrease to 35           Humalog    Change to 1 : 5  And 2:50   States doing between 15 to 30       Has approval for omnipod and dexcom but hasn't started       .

## 2021-07-12 ENCOUNTER — OFFICE VISIT (OUTPATIENT)
Dept: CARDIOLOGY | Facility: CLINIC | Age: 66
End: 2021-07-12

## 2021-07-12 VITALS
WEIGHT: 162 LBS | BODY MASS INDEX: 29.81 KG/M2 | HEART RATE: 78 BPM | DIASTOLIC BLOOD PRESSURE: 78 MMHG | HEIGHT: 62 IN | OXYGEN SATURATION: 99 % | SYSTOLIC BLOOD PRESSURE: 152 MMHG

## 2021-07-12 DIAGNOSIS — G89.29 CHRONIC CHEST PAIN WITH LOW TO MODERATE RISK FOR CAD: Primary | ICD-10-CM

## 2021-07-12 DIAGNOSIS — E11.69 MIXED DIABETIC HYPERLIPIDEMIA ASSOCIATED WITH TYPE 2 DIABETES MELLITUS (HCC): ICD-10-CM

## 2021-07-12 DIAGNOSIS — R07.9 CHRONIC CHEST PAIN WITH LOW TO MODERATE RISK FOR CAD: Primary | ICD-10-CM

## 2021-07-12 DIAGNOSIS — Z91.89 CHRONIC CHEST PAIN WITH LOW TO MODERATE RISK FOR CAD: Primary | ICD-10-CM

## 2021-07-12 DIAGNOSIS — K11.8 MASS OF BOTH PAROTID GLANDS: ICD-10-CM

## 2021-07-12 DIAGNOSIS — I15.2 HYPERTENSION ASSOCIATED WITH DIABETES (HCC): ICD-10-CM

## 2021-07-12 DIAGNOSIS — K11.21 ACUTE PAROTITIS: ICD-10-CM

## 2021-07-12 DIAGNOSIS — R06.02 SHORTNESS OF BREATH: ICD-10-CM

## 2021-07-12 DIAGNOSIS — E78.2 MIXED DIABETIC HYPERLIPIDEMIA ASSOCIATED WITH TYPE 2 DIABETES MELLITUS (HCC): ICD-10-CM

## 2021-07-12 DIAGNOSIS — E11.65 TYPE 2 DIABETES MELLITUS WITH HYPERGLYCEMIA, WITH LONG-TERM CURRENT USE OF INSULIN (HCC): ICD-10-CM

## 2021-07-12 DIAGNOSIS — Z79.4 TYPE 2 DIABETES MELLITUS WITH HYPERGLYCEMIA, WITH LONG-TERM CURRENT USE OF INSULIN (HCC): ICD-10-CM

## 2021-07-12 DIAGNOSIS — E11.59 HYPERTENSION ASSOCIATED WITH DIABETES (HCC): ICD-10-CM

## 2021-07-12 PROCEDURE — 99204 OFFICE O/P NEW MOD 45 MIN: CPT | Performed by: EMERGENCY MEDICINE

## 2021-07-13 PROBLEM — R07.9 CHRONIC CHEST PAIN WITH LOW TO MODERATE RISK FOR CAD: Status: ACTIVE | Noted: 2021-07-13

## 2021-07-13 PROBLEM — R06.02 SHORTNESS OF BREATH: Status: ACTIVE | Noted: 2017-07-17

## 2021-07-13 PROBLEM — Z91.89 CHRONIC CHEST PAIN WITH LOW TO MODERATE RISK FOR CAD: Status: ACTIVE | Noted: 2021-07-13

## 2021-07-13 PROBLEM — G89.29 CHRONIC CHEST PAIN WITH LOW TO MODERATE RISK FOR CAD: Status: ACTIVE | Noted: 2021-07-13

## 2021-07-13 PROCEDURE — 93000 ELECTROCARDIOGRAM COMPLETE: CPT | Performed by: EMERGENCY MEDICINE

## 2021-07-13 NOTE — PROGRESS NOTES
[facility] - CARDIOLOGY  New Patient Initial Outpatient Evaulation    Primary Care Physician: Ramana Lynn MD    Subjective     Chief Complaint   Patient presents with   • Surgical Clearance     NEW PT DR. HERNANDEZ/ NECK        History of Present Illness    Patient is a very pleasant 66-year-old female who presents to the cardiology clinic today for initial evaluation.  She says she has a history of Worthin tumors.  She currently has 2 masses in her parotid gland as well as a fistula and recurrent infections.  She is in the preoperative stages with Dr. Hernandez.  She has been referred to cardiology for a cardiac evaluation.    Patient tells me today that she has been having episodes of chest pain recently.  It is midsternal and also located on the right side of her chest.  She says when she pushes on it it is tender to the touch.  She describes it as sharp in nature as well as a pressure and squeezing-like sensation.  She also says she has shortness of breath but this is chronic from her COPD.    She has a history of hypertension, hyperlipidemia, COPD, diabetes and nicotine abuse (quit smoking 1-1/2 years ago).  She says at home her blood pressure is always poorly controlled in the 1  range.  It is 152/78 today.    Her current cardiac medications include aspirin 81, Lipitor 40, Cardizem 180, Lasix 40 twice daily and lisinopril 40.    Echocardiogram performed in 2017 showed mild concentric hypertrophy as well as grade 1 diastolic dysfunction.    Review of Systems   Constitutional: Negative for diaphoresis, fever and malaise/fatigue.   HENT: Negative for congestion.    Eyes: Negative for vision loss in left eye and vision loss in right eye.   Cardiovascular: Positive for chest pain. Negative for claudication, dyspnea on exertion, irregular heartbeat, leg swelling, orthopnea, palpitations and syncope.   Respiratory: Positive for shortness of breath. Negative for cough and wheezing.    Hematologic/Lymphatic: Negative  for adenopathy.   Skin: Negative for rash.   Musculoskeletal: Negative for joint pain and joint swelling.   Gastrointestinal: Negative for abdominal pain, diarrhea, nausea and vomiting.   Neurological: Negative for excessive daytime sleepiness, dizziness, focal weakness, light-headedness, numbness and weakness.   Psychiatric/Behavioral: Negative for depression. The patient does not have insomnia.         Otherwise complete ROS reviewed and negative except as mentioned in the HPI.      Past Medical History:   Past Medical History:   Diagnosis Date   • COPD (chronic obstructive pulmonary disease) (CMS/[facility])    • Diabetes mellitus (CMS/[facility])    • HTN (hypertension)    • Hyperlipidemia    • Tobacco use 6/15/2017       Past Surgical History:  Past Surgical History:   Procedure Laterality Date   • APPENDECTOMY     • CHOLECYSTECTOMY     • HYSTERECTOMY     • KNEE SURGERY         Family History: family history includes Cancer in her brother and mother; Diabetes in her mother; Lung cancer in her father; No Known Problems in her sister.    Social History:  reports that she has quit smoking. Her smoking use included cigarettes. She smoked 0.50 packs per day. She has never used smokeless tobacco. She reports that she does not drink alcohol and does not use drugs.    Medications:  Prior to Admission medications    Medication Sig Start Date End Date Taking? Authorizing Provider   albuterol (ACCUNEB) 0.63 MG/3ML nebulizer solution Take 1 ampule by nebulization every 6 hours as needed for Wheezing   Yes Natalee Stahl MD   Alcohol Swabs (ALCOHOL WIPES) pads Use 4 x daily 9/28/17  Yes Regino Rainey MD   ALPRAZolam (XANAX) 1 MG tablet Take 1 mg by mouth 3 (Three) Times a Day As Needed for Anxiety.   Yes Natalee Stahl MD   amitriptyline (ELAVIL) 10 MG tablet Take 10 mg by mouth Every Night.   Yes Natalee Stahl MD   aspirin 81 MG tablet Take 1 tablet by mouth Daily.  Patient taking differently: Take 325  mg by mouth Daily. 7/17/17  Yes Mora Aponte APRN   atorvastatin (LIPITOR) 40 MG tablet Take 40 mg by mouth Daily.   Yes Natalee Stahl MD   Blood Glucose Monitoring Suppl w/Device kit USE AS INDICATED, ANY MONITOR 9/28/17  Yes Regino Rainey MD   calcipotriene (DOVONEX) 0.005 % cream Apply  topically to the appropriate area as directed 2 (Two) Times a Day.   Yes Natalee Stahl MD   cetirizine (zyrTEC) 10 MG tablet Take 10 mg by mouth Daily.   Yes Natalee Stahl MD   clindamycin (Cleocin) 300 MG capsule Take 1 capsule by mouth 3 (Three) Times a Day. 2/21/21  Yes Abhinav Rojas DO   Continuous Blood Gluc Sensor (Dexcom G6 Sensor) As Needed (glucose control). Every 10 daysDexcom G6 Sensor Kit [address]-04 Use as directed for continuous glucose monitoring 5/6/21  Yes Regino Rainey MD   diltiaZEM CD (CARDIZEM CD) 180 MG 24 hr capsule Take 180 mg by mouth Daily.   Yes Natalee Stahl MD   Dulaglutide 0.75 MG/0.5ML solution pen-injector Inject 0.75 mg under the skin into the appropriate area as directed 1 (One) Time Per Week. 5/6/21  Yes Regino Rainey MD   escitalopram (LEXAPRO) 20 MG tablet Take 20 mg by mouth Daily.   Yes Natalee Stahl MD   esomeprazole (nexIUM) 40 MG capsule Take 40 mg by mouth Every Morning Before Breakfast.   Yes Natalee Stahl MD   fluticasone-salmeterol (ADVAIR) 250-50 MCG/DOSE DISKUS Inhale 1 puff 2 (Two) Times a Day.   Yes Natalee Stahl MD   furosemide (LASIX) 40 MG tablet Take 40 mg by mouth 2 (Two) Times a Day.   Yes Natalee Stahl MD   gabapentin (NEURONTIN) 400 MG capsule 2 capsules TID  Patient taking differently: 400 mg 3 (Three) Times a Day. 2 capsules TID 1/25/18  Yes Evin Dao APRN   Glucose Blood (BLOOD GLUCOSE TEST) strip Use 4 x daily, use any brand covered by insurance or same brand as before 9/28/17  Yes Regino Rainey MD   HYDROcodone-acetaminophen (NORCO)  " MG per tablet Take 1 tablet by mouth Every 8 (Eight) Hours As Needed for Moderate Pain .   Yes Natalee Stahl MD   insulin aspart (NovoLOG FlexPen) 100 UNIT/ML solution pen-injector sc pen Up to 20 units with meals. e11.65 5/6/21  Yes Regino Rainey MD   insulin degludec (Tresiba FlexTouch) 100 UNIT/ML solution pen-injector injection Inject 40 Units under the skin into the appropriate area as directed Every Night. E11.9 5/6/21  Yes Regino Rainey MD   Insulin Disposable Pump (OmniPod Dash 5 Pack Pods) misc 1 each Every 72 (Seventy-Two) Hours. 5/6/21  Yes Regino Rainey MD   Insulin Pen Needle (Pen Needles) 32G X 4 MM misc 1 each 4 (Four) Times a Day. Use 4 x daily, Dx code E11.65 5/6/21  Yes Regino Rainey MD   Lancet Devices (LANCING DEVICE) misc USE AS INDICATED TO CORRELATE WITH STRIPS AND METER 9/28/17  Yes Regino Rainey MD   Lancets 30G misc USE 4 X DAILY 9/28/17  Yes Regino Rainey MD   levothyroxine (SYNTHROID, LEVOTHROID) 25 MCG tablet Take 25 mcg by mouth Daily.   Yes ProviderNatalee MD   lisinopril (PRINIVIL,ZESTRIL) 20 MG tablet Take 40 mg by mouth Daily.   Yes Natalee Stahl MD   potassium bicarbonate (K-LYTE) 25 MEQ disintegrating tablet Take 25 mEq by mouth 2 (Two) Times a Day.   Yes ProviderNatalee MD   tiotropium (SPIRIVA) 18 MCG per inhalation capsule Place 1 capsule into inhaler and inhale Daily.   Yes Natalee Stahl MD   varenicline (CHANTIX) 0.5 MG tablet Take 0.5 mg by mouth 2 (Two) Times a Day.   Yes Natalee Stahl MD     Allergies:  Allergies   Allergen Reactions   • Ceftin [Cefuroxime Axetil] Swelling   • Contrast Dye Swelling   • Iodinated Diagnostic Agents Swelling   • Codeine Nausea And Vomiting       Objective     Vital Signs: /78   Pulse 78   Ht 157.5 cm (62.01\")   Wt 73.5 kg (162 lb)   SpO2 99%   BMI 29.62 kg/m²     Vitals and nursing note reviewed. "   Constitutional:       Appearance: Normal and healthy appearance. Well-developed and not in distress.   Eyes:      Extraocular Movements: Extraocular movements intact.      Pupils: Pupils are equal, round, and reactive to light.   HENT:      Head: Normocephalic and atraumatic.    Mouth/Throat:      Pharynx: Oropharynx is clear.   Neck:      Vascular: JVD normal.      Trachea: Trachea normal.   Pulmonary:      Effort: Pulmonary effort is normal.      Breath sounds: Normal breath sounds. No wheezing. No rhonchi. No rales.   Cardiovascular:      PMI at left midclavicular line. Normal rate. Regular rhythm. Normal S1. Normal S2.      Murmurs: There is a grade 2/6 systolic murmur.      No gallop. No click. No rub.   Pulses:     Dorsalis pedis: 2+ bilaterally.     Posterior tibial: 2+ bilaterally.  Abdominal:      General: Bowel sounds are normal.      Palpations: Abdomen is soft.      Tenderness: There is no abdominal tenderness.   Musculoskeletal: Normal range of motion.      Cervical back: Normal range of motion and neck supple. Skin:     General: Skin is warm and dry.      Capillary Refill: Capillary refill takes less than 2 seconds.   Feet:      Right foot:      Skin integrity: Skin integrity normal.      Left foot:      Skin integrity: Skin integrity normal.   Neurological:      Mental Status: Alert and oriented to person, place and time.      Cranial Nerves: Cranial nerves are intact.      Sensory: Sensation is intact.      Motor: Motor function is intact.      Coordination: Coordination is intact.   Psychiatric:         Speech: Speech normal.         Behavior: Behavior is cooperative.         Results Reviewed:      ECG 12 Lead    Date/Time: 7/13/2021 6:41 PM  Performed by: Zan Alston DO  Authorized by: Zan Alston DO   Comparison: compared with previous ECG   Similar to previous ECG  Rhythm: sinus rhythm  Rate: normal  QRS axis: normal  Other findings: non-specific ST-T wave  changes    Clinical impression: abnormal EKG              Lab Results   Component Value Date    TRIG 111 05/03/2021    HDL 57 (L) 05/03/2021     Lab Results   Component Value Date    HGBA1C 9.3 (H) 05/03/2021       Assessment / Plan        Problem List Items Addressed This Visit        Cardiac and Vasculature    Hypertension associated with diabetes (CMS/HCC)    Mixed diabetic hyperlipidemia associated with type 2 diabetes mellitus (CMS/HCC)    Chronic chest pain with low to moderate risk for CAD - Primary    Relevant Orders    Adult Transthoracic Echo Complete W/ Cont if Necessary Per Protocol    Stress Test With Myocardial Perfusion (1 Day)       ENT    Mass of both parotid glands    Acute parotitis       Endocrine and Metabolic    Type 2 diabetes mellitus with hyperglycemia, with long-term current use of insulin (CMS/HCC)       Pulmonary and Pneumonias    Shortness of breath    Relevant Orders    Adult Transthoracic Echo Complete W/ Cont if Necessary Per Protocol    Stress Test With Myocardial Perfusion (1 Day)          Plan:    We will schedule the patient for a nuclear stress test as well as transthoracic echocardiogram to further risk stratify her for coronary artery disease prior to undergoing surgery.  Recommend holding off on surgery until we can can work patient up for significant heart disease and make sure she is medically maximized before undergoing surgery.    Currently she is on aspirin, high intensity statin, calcium channel blocker, loop diuretic and ACE inhibitor.  Will need to also get her blood pressure under control before she undergoes surgery especially since she has systolics running over 200.    She will follow-up in approximately 2 to 3 weeks at which time we will go over her tests and get her on the proper medical regimen.      Thank you Kaylah Rolle for this referral.  Please call me with any questions at any time.  My personal cell phone number is [phone].      Zan GALEAS  DO Gamaliel   Interventional cardiology  [facility]  07/13/21   18:42 CDT

## 2021-07-20 ENCOUNTER — HOSPITAL ENCOUNTER (OUTPATIENT)
Dept: CARDIOLOGY | Facility: HOSPITAL | Age: 66
Discharge: HOME | End: 2021-07-20

## 2021-07-20 ENCOUNTER — APPOINTMENT (OUTPATIENT)
Dept: CARDIOLOGY | Facility: HOSPITAL | Age: 66
End: 2021-07-20

## 2021-07-20 VITALS — WEIGHT: 162.04 LBS | HEIGHT: 62 IN | BODY MASS INDEX: 29.82 KG/M2

## 2021-07-20 VITALS
HEART RATE: 85 BPM | WEIGHT: 162.04 LBS | SYSTOLIC BLOOD PRESSURE: 120 MMHG | BODY MASS INDEX: 29.82 KG/M2 | DIASTOLIC BLOOD PRESSURE: 74 MMHG | HEIGHT: 62 IN

## 2021-07-20 DIAGNOSIS — R07.9 CHRONIC CHEST PAIN WITH LOW TO MODERATE RISK FOR CAD: ICD-10-CM

## 2021-07-20 DIAGNOSIS — G89.29 CHRONIC CHEST PAIN WITH LOW TO MODERATE RISK FOR CAD: ICD-10-CM

## 2021-07-20 DIAGNOSIS — Z91.89 CHRONIC CHEST PAIN WITH LOW TO MODERATE RISK FOR CAD: ICD-10-CM

## 2021-07-20 DIAGNOSIS — R06.02 SHORTNESS OF BREATH: ICD-10-CM

## 2021-07-20 LAB
BH CV ECHO MEAS - AO MAX PG (FULL): 7.3 MMHG
BH CV ECHO MEAS - AO MAX PG: 11.4 MMHG
BH CV ECHO MEAS - AO MEAN PG (FULL): 4 MMHG
BH CV ECHO MEAS - AO MEAN PG: 7 MMHG
BH CV ECHO MEAS - AO ROOT AREA (BSA CORRECTED): 1.8
BH CV ECHO MEAS - AO ROOT AREA: 7.5 CM^2
BH CV ECHO MEAS - AO ROOT DIAM: 3.1 CM
BH CV ECHO MEAS - AO V2 MAX: 169 CM/SEC
BH CV ECHO MEAS - AO V2 MEAN: 129 CM/SEC
BH CV ECHO MEAS - AO V2 VTI: 42.8 CM
BH CV ECHO MEAS - AVA(I,A): 1.8 CM^2
BH CV ECHO MEAS - AVA(I,D): 1.8 CM^2
BH CV ECHO MEAS - AVA(V,A): 1.9 CM^2
BH CV ECHO MEAS - AVA(V,D): 1.9 CM^2
BH CV ECHO MEAS - BSA(HAYCOCK): 1.8 M^2
BH CV ECHO MEAS - BSA: 1.7 M^2
BH CV ECHO MEAS - BZI_BMI: 29.6 KILOGRAMS/M^2
BH CV ECHO MEAS - BZI_METRIC_HEIGHT: 157.5 CM
BH CV ECHO MEAS - BZI_METRIC_WEIGHT: 73.5 KG
BH CV ECHO MEAS - EDV(CUBED): 77.9 ML
BH CV ECHO MEAS - EDV(MOD-SP4): 122 ML
BH CV ECHO MEAS - EDV(TEICH): 81.7 ML
BH CV ECHO MEAS - EF(CUBED): 66.7 %
BH CV ECHO MEAS - EF(MOD-SP4): 50.2 %
BH CV ECHO MEAS - EF(TEICH): 58.5 %
BH CV ECHO MEAS - ESV(CUBED): 25.9 ML
BH CV ECHO MEAS - ESV(MOD-SP4): 60.8 ML
BH CV ECHO MEAS - ESV(TEICH): 33.9 ML
BH CV ECHO MEAS - FS: 30.7 %
BH CV ECHO MEAS - IVS/LVPW: 1.2
BH CV ECHO MEAS - IVSD: 1.8 CM
BH CV ECHO MEAS - LA DIMENSION: 4 CM
BH CV ECHO MEAS - LA/AO: 1.3
BH CV ECHO MEAS - LAT PEAK E' VEL: 6.6 CM/SEC
BH CV ECHO MEAS - LV DIASTOLIC VOL/BSA (35-75): 69.8 ML/M^2
BH CV ECHO MEAS - LV MASS(C)D: 298.2 GRAMS
BH CV ECHO MEAS - LV MASS(C)DI: 170.6 GRAMS/M^2
BH CV ECHO MEAS - LV MAX PG: 4.2 MMHG
BH CV ECHO MEAS - LV MEAN PG: 3 MMHG
BH CV ECHO MEAS - LV SYSTOLIC VOL/BSA (12-30): 34.8 ML/M^2
BH CV ECHO MEAS - LV V1 MAX: 102 CM/SEC
BH CV ECHO MEAS - LV V1 MEAN: 74.4 CM/SEC
BH CV ECHO MEAS - LV V1 VTI: 25.1 CM
BH CV ECHO MEAS - LVIDD: 4.3 CM
BH CV ECHO MEAS - LVIDS: 3 CM
BH CV ECHO MEAS - LVLD AP4: 7.6 CM
BH CV ECHO MEAS - LVLS AP4: 6.6 CM
BH CV ECHO MEAS - LVOT AREA (M): 3.1 CM^2
BH CV ECHO MEAS - LVOT AREA: 3.1 CM^2
BH CV ECHO MEAS - LVOT DIAM: 2 CM
BH CV ECHO MEAS - LVPWD: 1.5 CM
BH CV ECHO MEAS - MED PEAK E' VEL: 7.7 CM/SEC
BH CV ECHO MEAS - MV A MAX VEL: 108 CM/SEC
BH CV ECHO MEAS - MV DEC SLOPE: 345 CM/SEC^2
BH CV ECHO MEAS - MV DEC TIME: 0.18 SEC
BH CV ECHO MEAS - MV E MAX VEL: 63.2 CM/SEC
BH CV ECHO MEAS - MV E/A: 0.59
BH CV ECHO MEAS - SI(AO): 184.8 ML/M^2
BH CV ECHO MEAS - SI(CUBED): 29.7 ML/M^2
BH CV ECHO MEAS - SI(LVOT): 45.1 ML/M^2
BH CV ECHO MEAS - SI(MOD-SP4): 35 ML/M^2
BH CV ECHO MEAS - SI(TEICH): 27.4 ML/M^2
BH CV ECHO MEAS - SV(AO): 323 ML
BH CV ECHO MEAS - SV(CUBED): 51.9 ML
BH CV ECHO MEAS - SV(LVOT): 78.9 ML
BH CV ECHO MEAS - SV(MOD-SP4): 61.2 ML
BH CV ECHO MEAS - SV(TEICH): 47.8 ML
BH CV ECHO MEASUREMENTS AVERAGE E/E' RATIO: 8.84
BH CV REST NUCLEAR ISOTOPE DOSE: 10.7 MCI
BH CV STRESS BP STAGE 1: NORMAL
BH CV STRESS COMMENTS STAGE 1: NORMAL
BH CV STRESS DOSE REGADENOSON STAGE 1: 0.4
BH CV STRESS DURATION MIN STAGE 1: 0
BH CV STRESS DURATION SEC STAGE 1: 10
BH CV STRESS HR STAGE 1: 102
BH CV STRESS NUCLEAR ISOTOPE DOSE: 33 MCI
BH CV STRESS PROTOCOL 1: NORMAL
BH CV STRESS RECOVERY BP: NORMAL MMHG
BH CV STRESS RECOVERY HR: 98 BPM
BH CV STRESS STAGE 1: 1
LEFT ATRIUM VOLUME INDEX: 23.5 ML/M2
LEFT ATRIUM VOLUME: 41.1 CM3
MAXIMAL PREDICTED HEART RATE: 156 BPM
MAXIMAL PREDICTED HEART RATE: 156 BPM
PERCENT MAX PREDICTED HR: 65.38 %
STRESS BASELINE BP: NORMAL MMHG
STRESS BASELINE HR: 81 BPM
STRESS PERCENT HR: 77 %
STRESS POST EXERCISE DUR SEC: 10 SEC
STRESS POST PEAK BP: NORMAL MMHG
STRESS POST PEAK HR: 102 BPM
STRESS TARGET HR: 133 BPM
STRESS TARGET HR: 133 BPM

## 2021-07-20 PROCEDURE — 25010000002 AMINOPHYLLINE PER 250 MG: Performed by: EMERGENCY MEDICINE

## 2021-07-20 PROCEDURE — 93306 TTE W/DOPPLER COMPLETE: CPT | Performed by: EMERGENCY MEDICINE

## 2021-07-20 PROCEDURE — 93356 MYOCRD STRAIN IMG SPCKL TRCK: CPT | Performed by: EMERGENCY MEDICINE

## 2021-07-20 PROCEDURE — A9500 TC99M SESTAMIBI: HCPCS | Performed by: EMERGENCY MEDICINE

## 2021-07-20 PROCEDURE — 0 TECHNETIUM SESTAMIBI: Performed by: EMERGENCY MEDICINE

## 2021-07-20 PROCEDURE — 93356 MYOCRD STRAIN IMG SPCKL TRCK: CPT

## 2021-07-20 PROCEDURE — 25010000002 REGADENOSON 0.4 MG/5ML SOLUTION: Performed by: EMERGENCY MEDICINE

## 2021-07-20 PROCEDURE — 93017 CV STRESS TEST TRACING ONLY: CPT

## 2021-07-20 PROCEDURE — 78452 HT MUSCLE IMAGE SPECT MULT: CPT | Performed by: EMERGENCY MEDICINE

## 2021-07-20 PROCEDURE — 78452 HT MUSCLE IMAGE SPECT MULT: CPT

## 2021-07-20 PROCEDURE — 25010000002 PERFLUTREN 6.52 MG/ML SUSPENSION: Performed by: EMERGENCY MEDICINE

## 2021-07-20 PROCEDURE — 93306 TTE W/DOPPLER COMPLETE: CPT

## 2021-07-20 PROCEDURE — 93018 CV STRESS TEST I&R ONLY: CPT | Performed by: EMERGENCY MEDICINE

## 2021-07-20 RX ORDER — AMINOPHYLLINE DIHYDRATE 25 MG/ML
150 INJECTION, SOLUTION INTRAVENOUS ONCE
Status: COMPLETED | OUTPATIENT
Start: 2021-07-20 | End: 2021-07-20

## 2021-07-20 RX ADMIN — PERFLUTREN 8.48 MG: 6.52 INJECTION, SUSPENSION INTRAVENOUS at 09:36

## 2021-07-20 RX ADMIN — AMINOPHYLLINE 150 MG: 25 INJECTION, SOLUTION INTRAVENOUS at 11:24

## 2021-07-20 RX ADMIN — TECHNETIUM TC 99M SESTAMIBI 1 DOSE: 1 INJECTION INTRAVENOUS at 11:25

## 2021-07-20 RX ADMIN — REGADENOSON 0.4 MG: 0.08 INJECTION, SOLUTION INTRAVENOUS at 11:04

## 2021-07-20 RX ADMIN — TECHNETIUM TC 99M SESTAMIBI 1 DOSE: 1 INJECTION INTRAVENOUS at 08:55

## 2021-07-21 DIAGNOSIS — R94.39 ABNORMAL STRESS TEST: Primary | ICD-10-CM

## 2021-07-21 DIAGNOSIS — I20.0 UNSTABLE ANGINA (HCC): ICD-10-CM

## 2021-07-22 DIAGNOSIS — Z91.041 ALLERGY TO RADIOGRAPHIC DYE: Primary | ICD-10-CM

## 2021-07-22 RX ORDER — CIMETIDINE 300 MG/1
300 TABLET, FILM COATED ORAL TAKE AS DIRECTED
Qty: 2 TABLET | Refills: 0 | Status: SHIPPED | OUTPATIENT
Start: 2021-07-27

## 2021-07-22 RX ORDER — PREDNISONE 20 MG/1
80 TABLET ORAL TAKE AS DIRECTED
Qty: 4 TABLET | Refills: 0 | Status: SHIPPED | OUTPATIENT
Start: 2021-07-28

## 2021-07-22 RX ORDER — DIPHENHYDRAMINE HCL 25 MG
25 TABLET ORAL TAKE AS DIRECTED
Qty: 1 TABLET | Refills: 0 | Status: SHIPPED | OUTPATIENT
Start: 2021-07-28

## 2021-07-28 ENCOUNTER — HOSPITAL ENCOUNTER (OUTPATIENT)
Facility: HOSPITAL | Age: 66
Discharge: HOME | End: 2021-07-28
Attending: EMERGENCY MEDICINE | Admitting: EMERGENCY MEDICINE

## 2021-07-28 VITALS
RESPIRATION RATE: 19 BRPM | HEIGHT: 62 IN | OXYGEN SATURATION: 96 % | SYSTOLIC BLOOD PRESSURE: 120 MMHG | TEMPERATURE: 97 F | HEART RATE: 77 BPM | BODY MASS INDEX: 28.52 KG/M2 | DIASTOLIC BLOOD PRESSURE: 59 MMHG | WEIGHT: 155 LBS

## 2021-07-28 DIAGNOSIS — R94.39 ABNORMAL STRESS TEST: ICD-10-CM

## 2021-07-28 DIAGNOSIS — I20.0 UNSTABLE ANGINA (HCC): ICD-10-CM

## 2021-07-28 LAB
ANION GAP SERPL CALCULATED.3IONS-SCNC: 5 MMOL/L (ref 5–15)
BUN SERPL-MCNC: 14 MG/DL (ref 8–23)
BUN/CREAT SERPL: 17.5 (ref 7–25)
CALCIUM SPEC-SCNC: 9.8 MG/DL (ref 8.6–10.5)
CHLORIDE SERPL-SCNC: 100 MMOL/L (ref 98–107)
CO2 SERPL-SCNC: 26 MMOL/L (ref 22–29)
CREAT SERPL-MCNC: 0.8 MG/DL (ref 0.57–1)
DEPRECATED RDW RBC AUTO: 43.3 FL (ref 37–54)
ERYTHROCYTE [DISTWIDTH] IN BLOOD BY AUTOMATED COUNT: 13.5 % (ref 12.3–15.4)
GFR SERPL CREATININE-BSD FRML MDRD: 72 ML/MIN/1.73
GLUCOSE SERPL-MCNC: 278 MG/DL (ref 65–99)
HCT VFR BLD AUTO: 38.1 % (ref 34–46.6)
HGB BLD-MCNC: 12.5 G/DL (ref 12–15.9)
INR PPP: 0.97 (ref 0.91–1.09)
MCH RBC QN AUTO: 28.5 PG (ref 26.6–33)
MCHC RBC AUTO-ENTMCNC: 32.8 G/DL (ref 31.5–35.7)
MCV RBC AUTO: 86.8 FL (ref 79–97)
PLATELET # BLD AUTO: 233 10*3/MM3 (ref 140–450)
PMV BLD AUTO: 12 FL (ref 6–12)
POTASSIUM SERPL-SCNC: 5 MMOL/L (ref 3.5–5.2)
PROTHROMBIN TIME: 12.1 SECONDS (ref 11.5–13.4)
RBC # BLD AUTO: 4.39 10*6/MM3 (ref 3.77–5.28)
SODIUM SERPL-SCNC: 131 MMOL/L (ref 136–145)
WBC # BLD AUTO: 6.98 10*3/MM3 (ref 3.4–10.8)

## 2021-07-28 PROCEDURE — 25010000002 MIDAZOLAM HCL (PF) 5 MG/5ML SOLUTION: Performed by: EMERGENCY MEDICINE

## 2021-07-28 PROCEDURE — C1894 INTRO/SHEATH, NON-LASER: HCPCS | Performed by: EMERGENCY MEDICINE

## 2021-07-28 PROCEDURE — 25010000002 HEPARIN (PORCINE) PER 1000 UNITS: Performed by: EMERGENCY MEDICINE

## 2021-07-28 PROCEDURE — 93458 L HRT ARTERY/VENTRICLE ANGIO: CPT | Performed by: EMERGENCY MEDICINE

## 2021-07-28 PROCEDURE — 25010000002 FENTANYL CITRATE (PF) 100 MCG/2ML SOLUTION: Performed by: EMERGENCY MEDICINE

## 2021-07-28 PROCEDURE — 0 IOPAMIDOL PER 1 ML: Performed by: EMERGENCY MEDICINE

## 2021-07-28 PROCEDURE — 25010000002 HEPARIN (PORCINE) 1000-0.9 UT/500ML-% SOLUTION: Performed by: EMERGENCY MEDICINE

## 2021-07-28 PROCEDURE — C1769 GUIDE WIRE: HCPCS | Performed by: EMERGENCY MEDICINE

## 2021-07-28 PROCEDURE — 25010000002 DIPHENHYDRAMINE PER 50 MG: Performed by: EMERGENCY MEDICINE

## 2021-07-28 PROCEDURE — 85610 PROTHROMBIN TIME: CPT | Performed by: EMERGENCY MEDICINE

## 2021-07-28 PROCEDURE — 99152 MOD SED SAME PHYS/QHP 5/>YRS: CPT | Performed by: EMERGENCY MEDICINE

## 2021-07-28 PROCEDURE — 80048 BASIC METABOLIC PNL TOTAL CA: CPT | Performed by: EMERGENCY MEDICINE

## 2021-07-28 PROCEDURE — 25010000002 HEPARIN (PORCINE) 2000-0.9 UNIT/L-% SOLUTION: Performed by: EMERGENCY MEDICINE

## 2021-07-28 PROCEDURE — 85027 COMPLETE CBC AUTOMATED: CPT | Performed by: EMERGENCY MEDICINE

## 2021-07-28 RX ORDER — FENTANYL CITRATE 50 UG/ML
INJECTION, SOLUTION INTRAMUSCULAR; INTRAVENOUS AS NEEDED
Status: DISCONTINUED | OUTPATIENT
Start: 2021-07-28 | End: 2021-07-28 | Stop reason: HOSPADM

## 2021-07-28 RX ORDER — SODIUM CHLORIDE 9 MG/ML
1-3 INJECTION, SOLUTION INTRAVENOUS CONTINUOUS
Status: DISCONTINUED | OUTPATIENT
Start: 2021-07-28 | End: 2021-07-28 | Stop reason: HOSPADM

## 2021-07-28 RX ORDER — ACETAMINOPHEN 325 MG/1
650 TABLET ORAL EVERY 4 HOURS PRN
Status: CANCELLED | OUTPATIENT
Start: 2021-07-28

## 2021-07-28 RX ORDER — HEPARIN SODIUM 200 [USP'U]/100ML
INJECTION, SOLUTION INTRAVENOUS AS NEEDED
Status: DISCONTINUED | OUTPATIENT
Start: 2021-07-28 | End: 2021-07-28 | Stop reason: HOSPADM

## 2021-07-28 RX ORDER — SODIUM CHLORIDE 9 MG/ML
100 INJECTION, SOLUTION INTRAVENOUS CONTINUOUS
Status: CANCELLED | OUTPATIENT
Start: 2021-07-28

## 2021-07-28 RX ORDER — ONDANSETRON 2 MG/ML
4 INJECTION INTRAMUSCULAR; INTRAVENOUS EVERY 6 HOURS PRN
Status: DISCONTINUED | OUTPATIENT
Start: 2021-07-28 | End: 2021-07-28 | Stop reason: HOSPADM

## 2021-07-28 RX ORDER — SODIUM CHLORIDE 0.9 % (FLUSH) 0.9 %
10 SYRINGE (ML) INJECTION AS NEEDED
Status: DISCONTINUED | OUTPATIENT
Start: 2021-07-28 | End: 2021-07-28 | Stop reason: HOSPADM

## 2021-07-28 RX ORDER — DIPHENHYDRAMINE HYDROCHLORIDE 50 MG/ML
INJECTION INTRAMUSCULAR; INTRAVENOUS AS NEEDED
Status: DISCONTINUED | OUTPATIENT
Start: 2021-07-28 | End: 2021-07-28 | Stop reason: HOSPADM

## 2021-07-28 RX ORDER — MIDAZOLAM HYDROCHLORIDE 1 MG/ML
INJECTION, SOLUTION INTRAMUSCULAR; INTRAVENOUS AS NEEDED
Status: DISCONTINUED | OUTPATIENT
Start: 2021-07-28 | End: 2021-07-28 | Stop reason: HOSPADM

## 2021-07-28 RX ORDER — SODIUM CHLORIDE 0.9 % (FLUSH) 0.9 %
3 SYRINGE (ML) INJECTION EVERY 12 HOURS SCHEDULED
Status: DISCONTINUED | OUTPATIENT
Start: 2021-07-28 | End: 2021-07-28 | Stop reason: HOSPADM

## 2021-07-28 RX ADMIN — SODIUM CHLORIDE 1.09 ML/KG/HR: 9 INJECTION, SOLUTION INTRAVENOUS at 10:40

## 2021-08-02 ENCOUNTER — TELEPHONE (OUTPATIENT)
Dept: CARDIOLOGY | Facility: CLINIC | Age: 66
End: 2021-08-02

## 2021-08-02 ENCOUNTER — HOSPITAL ENCOUNTER (OUTPATIENT)
Dept: CARDIOLOGY | Facility: HOSPITAL | Age: 66
Discharge: HOME | End: 2021-08-02

## 2021-08-02 DIAGNOSIS — Z98.890 STATUS POST LEFT HEART CATHETERIZATION: Primary | ICD-10-CM

## 2021-08-02 DIAGNOSIS — M79.601 PAIN OF RIGHT UPPER EXTREMITY: Primary | ICD-10-CM

## 2021-08-02 RX ORDER — CYCLOBENZAPRINE HCL 10 MG
10 TABLET ORAL 3 TIMES DAILY PRN
Qty: 20 TABLET | Refills: 0 | Status: SHIPPED | OUTPATIENT
Start: 2021-08-02

## 2021-08-02 NOTE — TELEPHONE ENCOUNTER
PT CALLED STATING POST CATH  ON Wednesday HER ARM IS STILL KILLING HER, ORDERS PLACED AND PT NOTIFIED FOR SITE CHECK TODAY

## 2021-08-02 NOTE — NURSING NOTE
Pt given discharge instructions and prescription instructions at this time.  Pt instructed to be at heart center for venous doppler tomorrow AM at 0730.  Pt verbalized understanding of instructions.

## 2021-08-02 NOTE — NURSING NOTE
Pt being seen today for c/o pain in right wrist after cardiac cath last Wednesday 07/28/2021.  Right wrist soft, no bruising, swelling or hard area noted.  Pt c/o excruciating pain all the way up to shoulder when anything touches her arm. Pt also states her arm is ronni painful she cannot use it and today has been the first day she has been able to drive since last Wednesday.  Right radial and ulnar pulses palpable and equal.  Pulsatile waveform noted when pulse ox placed on right hand.  Dr UMAIR Alston notified of pt arrival.

## 2021-08-03 ENCOUNTER — HOSPITAL ENCOUNTER (OUTPATIENT)
Dept: ULTRASOUND IMAGING | Facility: HOSPITAL | Age: 66
Discharge: HOME | End: 2021-08-03
Admitting: EMERGENCY MEDICINE

## 2021-08-03 DIAGNOSIS — M79.601 PAIN OF RIGHT UPPER EXTREMITY: ICD-10-CM

## 2021-08-03 PROCEDURE — 93971 EXTREMITY STUDY: CPT

## 2021-08-03 PROCEDURE — 93971 EXTREMITY STUDY: CPT | Performed by: SURGERY

## 2021-09-02 RX ORDER — PROCHLORPERAZINE 25 MG/1
SUPPOSITORY RECTAL
Qty: 1 EACH | Refills: 1 | Status: SHIPPED | OUTPATIENT
Start: 2021-09-02

## 2021-09-28 ENCOUNTER — TELEMEDICINE (OUTPATIENT)
Dept: ENDOCRINOLOGY | Facility: CLINIC | Age: 66
End: 2021-09-28

## 2021-09-28 DIAGNOSIS — E11.65 TYPE 2 DIABETES MELLITUS WITH HYPERGLYCEMIA, WITH LONG-TERM CURRENT USE OF INSULIN (HCC): Primary | ICD-10-CM

## 2021-09-28 DIAGNOSIS — I15.2 HYPERTENSION ASSOCIATED WITH DIABETES (HCC): ICD-10-CM

## 2021-09-28 DIAGNOSIS — E78.2 MIXED DIABETIC HYPERLIPIDEMIA ASSOCIATED WITH TYPE 2 DIABETES MELLITUS (HCC): ICD-10-CM

## 2021-09-28 DIAGNOSIS — Z79.4 TYPE 2 DIABETES MELLITUS WITH HYPERGLYCEMIA, WITH LONG-TERM CURRENT USE OF INSULIN (HCC): Primary | ICD-10-CM

## 2021-09-28 DIAGNOSIS — E11.69 MIXED DIABETIC HYPERLIPIDEMIA ASSOCIATED WITH TYPE 2 DIABETES MELLITUS (HCC): ICD-10-CM

## 2021-09-28 DIAGNOSIS — E55.9 VITAMIN D DEFICIENCY: ICD-10-CM

## 2021-09-28 DIAGNOSIS — E11.59 HYPERTENSION ASSOCIATED WITH DIABETES (HCC): ICD-10-CM

## 2021-09-28 LAB
AVERAGE GLUCOSE: NORMAL
HBA1C MFR BLD: 7.5 %
HBA1C MFR BLD: 7.5 %

## 2021-09-28 PROCEDURE — 99214 OFFICE O/P EST MOD 30 MIN: CPT | Performed by: INTERNAL MEDICINE

## 2021-09-28 NOTE — PROGRESS NOTES
Subjective    Yolanda Mendenhall is a 66 y.o. female. she is here today for follow-up of diabetes                                      This was a Telehealth Encounter. Benefits and Disadvantages of a Telehealth Visit were discussed and accepted by patient. .  Patient agreed to receive service through Telehealth visit as patient is being compliant with social distancing recommendations imparted by CDC.     You have chosen to receive care through a telehealth visit.  Do you consent to use a video/audio connection for your medical care today? Yes      HPI     Ms. 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  Needs repeat    Presently taking care of her son that has pelvic fracture    Has omnipod and dexcom but hasn't started    Physical Exam  AOx3  Cervical neck scar , right   RRR  CTA  No edema  Using cane     Labs    Lab Results   Component Value Date    GLUCOSE 278 (H) 07/28/2021    BUN 14 07/28/2021    CREATININE 0.80 07/28/2021    EGFRIFNONA 72 07/28/2021    EGFRIFAFRI >59 05/10/2021    BCR 17.5 07/28/2021    K 5.0 07/28/2021    CO2 26.0 07/28/2021    CALCIUM 9.8 07/28/2021    ALBUMIN 3.1 (L) 05/10/2021    AST 13 05/10/2021    ALT 11 05/10/2021     Lab Results   Component Value Date    WBC 6.98 07/28/2021    HGB 12.5 07/28/2021    HCT 38.1 07/28/2021    MCV 86.8 07/28/2021     07/28/2021         Assessment/Plan      1. Type 2 diabetes mellitus with hyperglycemia, with long-term current use of insulin (HCC)    2. Hypertension associated with diabetes (HCC)    3. Mixed diabetic hyperlipidemia associated with type 2 diabetes mellitus (HCC)    4. Vitamin D deficiency     .    Glycemic Management    Lab Results   Component Value Date    HGBA1C 7.5 09/28/2021    HGBA1C 9.3 (H) 05/03/2021    HGBA1C 10.0 (H) 09/18/2019     Lab Results   Component Value Date    CREATININE 0.80 07/28/2021       Side effects to metformin and jardiance          trulicity 0.75 mg weekly - able to tolerate but not higher doses-          Tresiba -- taking 100 units - decrease to 40 decrease to 35, this is working            Humalog   Change to 1 : 5  And 2:50   States doing between 15 to 30 and readings in the 100s per her report     Will restart omnipod today     dexcom but hasn't started       .

## 2021-10-04 RX ORDER — INSULIN ASPART 100 [IU]/ML
INJECTION, SOLUTION INTRAVENOUS; SUBCUTANEOUS
Qty: 15 ML | Refills: 3 | Status: SHIPPED | OUTPATIENT
Start: 2021-10-04 | End: 2021-12-13 | Stop reason: SDUPTHER

## 2021-10-04 RX ORDER — INSULIN DEGLUDEC 200 U/ML
INJECTION, SOLUTION SUBCUTANEOUS
Qty: 18 ML | Refills: 5 | Status: SHIPPED | OUTPATIENT
Start: 2021-10-04 | End: 2021-12-13 | Stop reason: SDUPTHER

## 2021-10-05 ENCOUNTER — VIRTUAL VISIT (OUTPATIENT)
Dept: FAMILY MEDICINE CLINIC | Age: 66
End: 2021-10-05
Payer: MEDICARE

## 2021-10-05 DIAGNOSIS — E03.9 ACQUIRED HYPOTHYROIDISM: ICD-10-CM

## 2021-10-05 DIAGNOSIS — F41.9 ANXIETY: Primary | ICD-10-CM

## 2021-10-05 DIAGNOSIS — E11.65 TYPE 2 DIABETES MELLITUS WITH HYPERGLYCEMIA, WITH LONG-TERM CURRENT USE OF INSULIN (HCC): ICD-10-CM

## 2021-10-05 DIAGNOSIS — Z79.4 TYPE 2 DIABETES MELLITUS WITH HYPERGLYCEMIA, WITH LONG-TERM CURRENT USE OF INSULIN (HCC): ICD-10-CM

## 2021-10-05 PROCEDURE — 99214 OFFICE O/P EST MOD 30 MIN: CPT | Performed by: FAMILY MEDICINE

## 2021-10-05 RX ORDER — ALPRAZOLAM 1 MG/1
1 TABLET ORAL 3 TIMES DAILY PRN
Qty: 75 TABLET | Refills: 2 | Status: SHIPPED | OUTPATIENT
Start: 2021-10-05 | End: 2021-12-13 | Stop reason: SDUPTHER

## 2021-10-05 RX ORDER — DULAGLUTIDE 0.75 MG/.5ML
0.75 INJECTION, SOLUTION SUBCUTANEOUS WEEKLY
Qty: 12 PEN | Refills: 2 | Status: SHIPPED | OUTPATIENT
Start: 2021-10-05 | End: 2021-12-13 | Stop reason: SDUPTHER

## 2021-10-05 RX ORDER — ALPRAZOLAM 1 MG/1
1 TABLET ORAL NIGHTLY PRN
Qty: 75 TABLET | Refills: 2 | Status: SHIPPED | OUTPATIENT
Start: 2021-10-05 | End: 2021-10-05 | Stop reason: SDUPTHER

## 2021-10-05 ASSESSMENT — ENCOUNTER SYMPTOMS
CONSTIPATION: 0
EYE PAIN: 0
RHINORRHEA: 0
NAUSEA: 0
DIARRHEA: 0
SHORTNESS OF BREATH: 0
COUGH: 0
VOMITING: 0
ABDOMINAL PAIN: 0
BACK PAIN: 1
EYE DISCHARGE: 0
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
## 2021-10-05 NOTE — PATIENT INSTRUCTIONS
Patient Education        Learning About Anxiety Disorders  What are anxiety disorders? Anxiety disorders are a type of medical problem. They cause severe anxiety. When you feel anxious, you feel that something bad is about to happen. This feeling interferes with your life. These disorders include:  · Generalized anxiety disorder. You feel worried and stressed about many everyday events and activities. This goes on for several months and disrupts your life on most days. · Panic disorder. You have repeated panic attacks. A panic attack is a sudden, intense fear or anxiety. It may make you feel short of breath. Your heart may pound. · Social anxiety disorder. You feel very anxious about what you will say or do in front of people. For example, you may be scared to talk or eat in public. This problem affects your daily life. · Phobias. You are very scared of a specific object, situation, or activity. For example, you may fear spiders, high places, or small spaces. What are the symptoms? Generalized anxiety disorder  Symptoms may include:  · Feeling worried and stressed about many things almost every day. · Feeling tired or irritable. You may have a hard time concentrating. · Having headaches or muscle aches. · Having a hard time getting to sleep or staying asleep. Panic disorder  You may have repeated panic attacks when there is no reason for feeling afraid. You may change your daily activities because you worry that you will have another attack. Symptoms may include:  · Intense fear, terror, or anxiety. · Trouble breathing or very fast breathing. · Chest pain or tightness. · A heartbeat that races or is not regular. Social anxiety disorder  Symptoms may include:  · Fear about a social situation, such as eating in front of others or speaking in public. You may worry a lot. Or you may be afraid that something bad will happen. · Anxiety that can cause you to blush, sweat, and feel shaky.   · A heartbeat that is faster than normal.  · A hard time focusing. Phobias  Symptoms may include:  · More fear than most people of being around an object, being in a situation, or doing an activity. You might also be stressed about the chance of being around the thing you fear. · Worry about losing control, panicking, fainting, or having physical symptoms like a faster heartbeat when you are around the situation or object. How are these disorders treated? Anxiety disorders can be treated with medicines or counseling. A combination of both may be used. Medicines may include:  · Antidepressants. These may help your symptoms by keeping chemicals in your brain in balance. · Benzodiazepines. These may give you short-term relief of your symptoms. Some people use cognitive-behavioral therapy. A therapist helps you learn to change stressful or bad thoughts into helpful thoughts. Lead a healthy lifestyle  A healthy lifestyle may help you feel better. · Get at least 30 minutes of exercise on most days of the week. Walking is a good choice. · Eat a healthy diet. Include fruits, vegetables, lean proteins, and whole grains in your diet each day. · Try to go to bed at the same time every night. Try for 8 hours of sleep a night. · Find ways to manage stress. Try relaxation exercises. · Avoid alcohol and illegal drugs. Follow-up care is a key part of your treatment and safety. Be sure to make and go to all appointments, and call your doctor if you are having problems. It's also a good idea to know your test results and keep a list of the medicines you take. Where can you learn more? Go to [email or web address] and sign in to your [employer] account. Enter F201 in the [facility] box to learn more about \"Learning About Anxiety Disorders. \"     If you do not have an account, please click on the \"Sign Up Now\" link.   Current as of: June 16, 2021               Content Version: 13.0  © 8511-0661 [employer], [employer]. Care instructions adapted under license by [facility] ([facility]). If you have questions about a medical condition or this instruction, always ask your healthcare professional. [address] any warranty or liability for your use of this information.

## 2021-10-25 ENCOUNTER — TELEPHONE (OUTPATIENT)
Dept: ENDOCRINOLOGY | Facility: CLINIC | Age: 66
End: 2021-10-25

## 2021-10-25 NOTE — TELEPHONE ENCOUNTER
Needs novolog vials to use with the omnipod sent to [facility] [address] in [place], [place].  Thank you.

## 2021-11-01 DIAGNOSIS — E11.65 TYPE 2 DIABETES MELLITUS WITH HYPERGLYCEMIA, WITH LONG-TERM CURRENT USE OF INSULIN (HCC): Primary | ICD-10-CM

## 2021-11-01 DIAGNOSIS — Z79.4 TYPE 2 DIABETES MELLITUS WITH HYPERGLYCEMIA, WITH LONG-TERM CURRENT USE OF INSULIN (HCC): Primary | ICD-10-CM

## 2021-11-17 ENCOUNTER — TELEPHONE (OUTPATIENT)
Dept: ENDOCRINOLOGY | Facility: CLINIC | Age: 66
End: 2021-11-17

## 2021-11-17 NOTE — TELEPHONE ENCOUNTER
Iwona Enriquez from [facility] in [place] called and states pt is wanting her to be her diabetic educator. They need a referral sent to [phone] to start these services. Thank you

## 2021-12-02 ENCOUNTER — TELEPHONE (OUTPATIENT)
Dept: PRIMARY CARE CLINIC | Age: 66
End: 2021-12-02

## 2021-12-13 DIAGNOSIS — E87.5 HYPERKALEMIA: ICD-10-CM

## 2021-12-13 DIAGNOSIS — F33.41 RECURRENT MAJOR DEPRESSIVE DISORDER, IN PARTIAL REMISSION (HCC): ICD-10-CM

## 2021-12-13 DIAGNOSIS — I10 ESSENTIAL HYPERTENSION: ICD-10-CM

## 2021-12-13 DIAGNOSIS — J34.89 RHINORRHEA: ICD-10-CM

## 2021-12-13 DIAGNOSIS — E11.65 TYPE 2 DIABETES MELLITUS WITH HYPERGLYCEMIA, WITH LONG-TERM CURRENT USE OF INSULIN (HCC): ICD-10-CM

## 2021-12-13 DIAGNOSIS — Z79.4 TYPE 2 DIABETES MELLITUS WITH HYPERGLYCEMIA, WITH LONG-TERM CURRENT USE OF INSULIN (HCC): ICD-10-CM

## 2021-12-13 DIAGNOSIS — F41.9 ANXIETY: ICD-10-CM

## 2021-12-13 DIAGNOSIS — E78.2 MIXED HYPERLIPIDEMIA: ICD-10-CM

## 2021-12-13 DIAGNOSIS — K21.9 GASTROESOPHAGEAL REFLUX DISEASE WITHOUT ESOPHAGITIS: ICD-10-CM

## 2021-12-13 DIAGNOSIS — E03.9 ACQUIRED HYPOTHYROIDISM: ICD-10-CM

## 2021-12-13 RX ORDER — ESCITALOPRAM OXALATE 20 MG/1
TABLET ORAL
Qty: 90 TABLET | Refills: 0 | Status: SHIPPED | OUTPATIENT
Start: 2021-12-13 | End: 2022-01-18 | Stop reason: SDUPTHER

## 2021-12-13 RX ORDER — SODIUM POLYSTYRENE SULFONATE 15 G/60ML
15 SUSPENSION ORAL; RECTAL DAILY
Qty: 120 ML | Refills: 0 | Status: SHIPPED | OUTPATIENT
Start: 2021-12-13 | End: 2022-04-27

## 2021-12-13 RX ORDER — CETIRIZINE HYDROCHLORIDE 10 MG/1
TABLET ORAL
Qty: 90 TABLET | Refills: 0 | Status: SHIPPED | OUTPATIENT
Start: 2021-12-13 | End: 2022-01-18 | Stop reason: SDUPTHER

## 2021-12-13 RX ORDER — ALPRAZOLAM 1 MG/1
1 TABLET ORAL 3 TIMES DAILY PRN
Qty: 75 TABLET | Refills: 0 | Status: SHIPPED | OUTPATIENT
Start: 2021-12-13 | End: 2022-01-18 | Stop reason: SDUPTHER

## 2021-12-13 RX ORDER — DULAGLUTIDE 0.75 MG/.5ML
0.75 INJECTION, SOLUTION SUBCUTANEOUS WEEKLY
Qty: 12 PEN | Refills: 0 | Status: SHIPPED | OUTPATIENT
Start: 2021-12-13 | End: 2022-01-18 | Stop reason: SDUPTHER

## 2021-12-13 RX ORDER — LISINOPRIL 40 MG/1
TABLET ORAL
Qty: 90 TABLET | Refills: 0 | Status: SHIPPED | OUTPATIENT
Start: 2021-12-13 | End: 2022-01-18 | Stop reason: SDUPTHER

## 2021-12-13 RX ORDER — INSULIN DEGLUDEC 200 U/ML
INJECTION, SOLUTION SUBCUTANEOUS
Qty: 18 ML | Refills: 0 | Status: SHIPPED | OUTPATIENT
Start: 2021-12-13 | End: 2022-01-18 | Stop reason: SDUPTHER

## 2021-12-13 RX ORDER — LEVOTHYROXINE SODIUM 0.03 MG/1
TABLET ORAL
Qty: 90 TABLET | Refills: 0 | Status: SHIPPED | OUTPATIENT
Start: 2021-12-13 | End: 2022-01-18 | Stop reason: SDUPTHER

## 2021-12-13 RX ORDER — ATORVASTATIN CALCIUM 40 MG/1
TABLET, FILM COATED ORAL
Qty: 90 TABLET | Refills: 0 | Status: SHIPPED | OUTPATIENT
Start: 2021-12-13 | End: 2022-01-18 | Stop reason: SDUPTHER

## 2021-12-13 RX ORDER — INSULIN ASPART 100 [IU]/ML
INJECTION, SOLUTION INTRAVENOUS; SUBCUTANEOUS
Qty: 15 ML | Refills: 0 | Status: SHIPPED | OUTPATIENT
Start: 2021-12-13 | End: 2021-12-28

## 2021-12-13 RX ORDER — ESOMEPRAZOLE MAGNESIUM 40 MG/1
CAPSULE, DELAYED RELEASE ORAL
Qty: 90 CAPSULE | Refills: 0 | Status: SHIPPED | OUTPATIENT
Start: 2021-12-13 | End: 2022-01-18 | Stop reason: SDUPTHER
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
## 2021-12-14 ENCOUNTER — TELEPHONE (OUTPATIENT)
Dept: FAMILY MEDICINE CLINIC | Age: 66
End: 2021-12-14

## 2021-12-14 NOTE — TELEPHONE ENCOUNTER
Rx sent to pharmacy recommend patient following up in a month when they were doing follow-up unless they need me sooner.
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
## 2021-12-20 ENCOUNTER — TELEMEDICINE (OUTPATIENT)
Dept: ENDOCRINOLOGY | Facility: CLINIC | Age: 66
End: 2021-12-20

## 2021-12-20 DIAGNOSIS — E11.69 MIXED DIABETIC HYPERLIPIDEMIA ASSOCIATED WITH TYPE 2 DIABETES MELLITUS (HCC): ICD-10-CM

## 2021-12-20 DIAGNOSIS — E11.59 HYPERTENSION ASSOCIATED WITH DIABETES (HCC): ICD-10-CM

## 2021-12-20 DIAGNOSIS — E78.2 MIXED DIABETIC HYPERLIPIDEMIA ASSOCIATED WITH TYPE 2 DIABETES MELLITUS (HCC): ICD-10-CM

## 2021-12-20 DIAGNOSIS — I15.2 HYPERTENSION ASSOCIATED WITH DIABETES (HCC): ICD-10-CM

## 2021-12-20 DIAGNOSIS — E11.65 TYPE 2 DIABETES MELLITUS WITH HYPERGLYCEMIA, WITH LONG-TERM CURRENT USE OF INSULIN (HCC): Primary | ICD-10-CM

## 2021-12-20 DIAGNOSIS — Z79.4 TYPE 2 DIABETES MELLITUS WITH HYPERGLYCEMIA, WITH LONG-TERM CURRENT USE OF INSULIN (HCC): Primary | ICD-10-CM

## 2021-12-20 DIAGNOSIS — E55.9 VITAMIN D DEFICIENCY: ICD-10-CM

## 2021-12-20 PROCEDURE — 99214 OFFICE O/P EST MOD 30 MIN: CPT | Performed by: INTERNAL MEDICINE
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
## 2021-12-27 RX ORDER — INSULIN PUMP CONTROLLER
EACH MISCELLANEOUS
COMMUNITY
Start: 2021-05-25 | End: 2022-04-27

## 2021-12-27 RX ORDER — BLOOD-GLUCOSE TRANSMITTER
EACH MISCELLANEOUS
COMMUNITY
Start: 2021-05-13 | End: 2022-04-27

## 2021-12-27 RX ORDER — INSULIN LISPRO 100 [IU]/ML
INJECTION, SOLUTION INTRAVENOUS; SUBCUTANEOUS
COMMUNITY
Start: 2021-01-15 | End: 2022-01-18

## 2021-12-27 RX ORDER — DILTIAZEM HYDROCHLORIDE 180 MG/1
180 CAPSULE, EXTENDED RELEASE ORAL DAILY
COMMUNITY
End: 2022-04-27

## 2021-12-27 RX ORDER — CALCIPOTRIENE 50 UG/G
CREAM TOPICAL
COMMUNITY
End: 2022-04-27

## 2021-12-27 RX ORDER — CALCIFEDIOL 30 UG/1
1 CAPSULE, EXTENDED RELEASE ORAL DAILY
COMMUNITY
Start: 2021-04-15 | End: 2022-04-27

## 2021-12-27 RX ORDER — BLOOD-GLUCOSE SENSOR
EACH MISCELLANEOUS
COMMUNITY
Start: 2021-05-13 | End: 2022-04-27

## 2021-12-27 RX ORDER — VARENICLINE TARTRATE 0.5 MG/1
0.5 TABLET, FILM COATED ORAL
COMMUNITY
End: 2022-01-18

## 2021-12-27 RX ORDER — CIMETIDINE 300 MG/1
300 TABLET, FILM COATED ORAL
COMMUNITY
Start: 2021-07-27 | End: 2022-01-18

## 2021-12-27 RX ORDER — AMITRIPTYLINE HYDROCHLORIDE 10 MG/1
10 TABLET, FILM COATED ORAL DAILY
COMMUNITY
End: 2021-12-27

## 2021-12-27 RX ORDER — BLOOD-GLUCOSE,RECEIVER,CONT
EACH MISCELLANEOUS
COMMUNITY
Start: 2021-05-13 | End: 2022-04-27

## 2021-12-27 RX ORDER — POTASSIUM BICARBONATE 25 MEQ/1
25 TABLET, EFFERVESCENT ORAL
COMMUNITY
End: 2022-01-18

## 2021-12-27 RX ORDER — CYCLOBENZAPRINE HCL 10 MG
10 TABLET ORAL 3 TIMES DAILY PRN
COMMUNITY
Start: 2021-08-02 | End: 2022-04-27

## 2021-12-27 RX ORDER — HYDROCODONE BITARTRATE AND ACETAMINOPHEN 10; 325 MG/1; MG/1
TABLET ORAL
COMMUNITY
Start: 2021-04-17 | End: 2021-12-27

## 2021-12-27 NOTE — TELEPHONE ENCOUNTER
Last OV 10/5/2021  Next OV Visit date not found      Requested Prescriptions     Pending Prescriptions Disp Refills    insulin aspart (NOVOLOG FLEXPEN) 100 UNIT/ML injection pen [Pharmacy Med Name: Juju Zimmerman INJ 3ML (ORANGE)] 15 mL 0     Sig: ADMINISTER 40 UNITS UNDER THE SKIN THREE TIMES DAILY BEFORE MEALS

## 2021-12-28 RX ORDER — INSULIN ASPART 100 [IU]/ML
INJECTION, SOLUTION INTRAVENOUS; SUBCUTANEOUS
Qty: 15 ML | Refills: 0 | Status: SHIPPED | OUTPATIENT
Start: 2021-12-28 | End: 2022-01-05

## 2022-01-05 RX ORDER — INSULIN ASPART 100 [IU]/ML
INJECTION, SOLUTION INTRAVENOUS; SUBCUTANEOUS
Qty: 15 ML | Refills: 0 | Status: SHIPPED | OUTPATIENT
Start: 2022-01-05 | End: 2022-01-18 | Stop reason: SDUPTHER

## 2022-01-05 NOTE — TELEPHONE ENCOUNTER
Last OV 10/5/2021  Next OV Visit date not found      Requested Prescriptions     Pending Prescriptions Disp Refills    Insulin Aspart FlexPen 100 UNIT/ML SOPN [Pharmacy Med Name: INSULIN ASPART FLEXPEN INJ, 3ML] 15 mL 0     Sig: ADMINISTER 40 UNITS UNDER THE SKIN THREE TIMES DAILY BEFORE MEALS

## 2022-01-18 ENCOUNTER — OFFICE VISIT (OUTPATIENT)
Dept: FAMILY MEDICINE CLINIC | Age: 67
End: 2022-01-18
Payer: MEDICARE

## 2022-01-18 VITALS
WEIGHT: 147 LBS | OXYGEN SATURATION: 98 % | DIASTOLIC BLOOD PRESSURE: 72 MMHG | SYSTOLIC BLOOD PRESSURE: 138 MMHG | BODY MASS INDEX: 26.89 KG/M2 | TEMPERATURE: 97.7 F | HEART RATE: 92 BPM

## 2022-01-18 DIAGNOSIS — Z51.81 ENCOUNTER FOR THERAPEUTIC DRUG LEVEL MONITORING: ICD-10-CM

## 2022-01-18 DIAGNOSIS — K21.9 GASTROESOPHAGEAL REFLUX DISEASE WITHOUT ESOPHAGITIS: ICD-10-CM

## 2022-01-18 DIAGNOSIS — Z74.09 DECREASED AMBULATION STATUS: ICD-10-CM

## 2022-01-18 DIAGNOSIS — L30.9 ECZEMA, UNSPECIFIED TYPE: ICD-10-CM

## 2022-01-18 DIAGNOSIS — G62.9 NEUROPATHY: ICD-10-CM

## 2022-01-18 DIAGNOSIS — Z79.4 TYPE 2 DIABETES MELLITUS WITH HYPERGLYCEMIA, WITH LONG-TERM CURRENT USE OF INSULIN (HCC): Primary | ICD-10-CM

## 2022-01-18 DIAGNOSIS — J34.89 RHINORRHEA: ICD-10-CM

## 2022-01-18 DIAGNOSIS — R60.0 LOWER EXTREMITY EDEMA: ICD-10-CM

## 2022-01-18 DIAGNOSIS — F41.9 ANXIETY: ICD-10-CM

## 2022-01-18 DIAGNOSIS — I10 ESSENTIAL HYPERTENSION: ICD-10-CM

## 2022-01-18 DIAGNOSIS — E03.9 ACQUIRED HYPOTHYROIDISM: ICD-10-CM

## 2022-01-18 DIAGNOSIS — Z02.89 MEDICATION MANAGEMENT CONTRACT AGREEMENT: ICD-10-CM

## 2022-01-18 DIAGNOSIS — M54.50 CHRONIC LOW BACK PAIN, UNSPECIFIED BACK PAIN LATERALITY, UNSPECIFIED WHETHER SCIATICA PRESENT: ICD-10-CM

## 2022-01-18 DIAGNOSIS — F33.41 RECURRENT MAJOR DEPRESSIVE DISORDER, IN PARTIAL REMISSION (HCC): ICD-10-CM

## 2022-01-18 DIAGNOSIS — E11.65 TYPE 2 DIABETES MELLITUS WITH HYPERGLYCEMIA, WITH LONG-TERM CURRENT USE OF INSULIN (HCC): Primary | ICD-10-CM

## 2022-01-18 DIAGNOSIS — J44.9 CHRONIC OBSTRUCTIVE PULMONARY DISEASE, UNSPECIFIED COPD TYPE (HCC): ICD-10-CM

## 2022-01-18 DIAGNOSIS — E78.2 MIXED HYPERLIPIDEMIA: ICD-10-CM

## 2022-01-18 DIAGNOSIS — G89.29 CHRONIC LOW BACK PAIN, UNSPECIFIED BACK PAIN LATERALITY, UNSPECIFIED WHETHER SCIATICA PRESENT: ICD-10-CM

## 2022-01-18 LAB
ALCOHOL URINE: NORMAL
AMPHETAMINE SCREEN, URINE: NORMAL
BARBITURATE SCREEN, URINE: NORMAL
BENZODIAZEPINE SCREEN, URINE: NORMAL
BUPRENORPHINE URINE: NORMAL
COCAINE METABOLITE SCREEN URINE: NORMAL
FENTANYL SCREEN, URINE: NORMAL
GABAPENTIN SCREEN, URINE: NORMAL
HBA1C MFR BLD: 7.3 %
MDMA URINE: NORMAL
METHADONE SCREEN, URINE: NORMAL
METHAMPHETAMINE, URINE: NORMAL
OPIATE SCREEN URINE: NORMAL
OXYCODONE SCREEN URINE: NORMAL
PHENCYCLIDINE SCREEN URINE: NORMAL
PROPOXYPHENE SCREEN, URINE: NORMAL
SYNTHETIC CANNABINOIDS(K2) SCREEN, URINE: NORMAL
THC SCREEN, URINE: NORMAL
TRAMADOL SCREEN URINE: NORMAL
TRICYCLIC ANTIDEPRESSANTS, UR: NORMAL

## 2022-01-18 PROCEDURE — 3051F HG A1C>EQUAL 7.0%<8.0%: CPT | Performed by: FAMILY MEDICINE

## 2022-01-18 PROCEDURE — 99215 OFFICE O/P EST HI 40 MIN: CPT | Performed by: FAMILY MEDICINE

## 2022-01-18 PROCEDURE — 83036 HEMOGLOBIN GLYCOSYLATED A1C: CPT | Performed by: FAMILY MEDICINE

## 2022-01-18 PROCEDURE — 80305 DRUG TEST PRSMV DIR OPT OBS: CPT | Performed by: FAMILY MEDICINE

## 2022-01-18 RX ORDER — CETIRIZINE HYDROCHLORIDE 10 MG/1
TABLET ORAL
Qty: 90 TABLET | Refills: 1 | Status: SHIPPED | OUTPATIENT
Start: 2022-01-18

## 2022-01-18 RX ORDER — BLOOD-GLUCOSE METER
1 KIT MISCELLANEOUS DAILY
Qty: 1 KIT | Refills: 0 | Status: SHIPPED | OUTPATIENT
Start: 2022-01-18 | End: 2022-04-27

## 2022-01-18 RX ORDER — TRIAMCINOLONE ACETONIDE 1 MG/G
CREAM TOPICAL
Qty: 80 G | Refills: 1 | Status: SHIPPED | OUTPATIENT
Start: 2022-01-18 | End: 2022-04-27

## 2022-01-18 RX ORDER — LISINOPRIL 40 MG/1
TABLET ORAL
Qty: 90 TABLET | Refills: 1 | Status: SHIPPED | OUTPATIENT
Start: 2022-01-18 | End: 2022-03-14

## 2022-01-18 RX ORDER — GLUCOSAMINE HCL/CHONDROITIN SU 500-400 MG
CAPSULE ORAL
Qty: 100 STRIP | Refills: 5 | Status: SHIPPED | OUTPATIENT
Start: 2022-01-18 | End: 2022-04-27

## 2022-01-18 RX ORDER — ESOMEPRAZOLE MAGNESIUM 40 MG/1
CAPSULE, DELAYED RELEASE ORAL
Qty: 90 CAPSULE | Refills: 1 | Status: SHIPPED | OUTPATIENT
Start: 2022-01-18 | End: 2022-03-14

## 2022-01-18 RX ORDER — ATORVASTATIN CALCIUM 40 MG/1
TABLET, FILM COATED ORAL
Qty: 90 TABLET | Refills: 1 | Status: SHIPPED | OUTPATIENT
Start: 2022-01-18 | End: 2022-03-14

## 2022-01-18 RX ORDER — DULAGLUTIDE 0.75 MG/.5ML
0.75 INJECTION, SOLUTION SUBCUTANEOUS WEEKLY
Qty: 12 PEN | Refills: 1 | Status: SHIPPED | OUTPATIENT
Start: 2022-01-18 | End: 2022-03-09

## 2022-01-18 RX ORDER — ASPIRIN 81 MG/1
81 TABLET, CHEWABLE ORAL DAILY
COMMUNITY
End: 2022-04-27

## 2022-01-18 RX ORDER — LEVOTHYROXINE SODIUM 0.03 MG/1
TABLET ORAL
Qty: 90 TABLET | Refills: 1 | Status: SHIPPED | OUTPATIENT
Start: 2022-01-18 | End: 2022-03-14

## 2022-01-18 RX ORDER — HYDROCODONE BITARTRATE AND ACETAMINOPHEN 10; 325 MG/1; MG/1
TABLET ORAL
COMMUNITY
Start: 2022-01-14

## 2022-01-18 RX ORDER — FLUTICASONE PROPIONATE 50 MCG
SPRAY, SUSPENSION (ML) NASAL
Qty: 1 EACH | Refills: 2 | Status: SHIPPED | OUTPATIENT
Start: 2022-01-18 | End: 2022-04-27

## 2022-01-18 RX ORDER — INSULIN ASPART 100 [IU]/ML
INJECTION, SOLUTION INTRAVENOUS; SUBCUTANEOUS
Qty: 15 ML | Refills: 2 | Status: SHIPPED | OUTPATIENT
Start: 2022-01-18

## 2022-01-18 RX ORDER — ALBUTEROL SULFATE 90 UG/1
AEROSOL, METERED RESPIRATORY (INHALATION)
Qty: 18 G | Refills: 3 | Status: SHIPPED | OUTPATIENT
Start: 2022-01-18

## 2022-01-18 RX ORDER — ALBUTEROL SULFATE 0.63 MG/3ML
1 SOLUTION RESPIRATORY (INHALATION) EVERY 6 HOURS PRN
Qty: 270 ML | Refills: 5 | Status: SHIPPED | OUTPATIENT
Start: 2022-01-18 | End: 2022-04-27

## 2022-01-18 RX ORDER — INSULIN DEGLUDEC 200 U/ML
100 INJECTION, SOLUTION SUBCUTANEOUS NIGHTLY
Qty: 5 PEN | Refills: 2 | Status: SHIPPED | OUTPATIENT
Start: 2022-01-18 | End: 2022-04-27

## 2022-01-18 RX ORDER — FUROSEMIDE 40 MG/1
40 TABLET ORAL DAILY PRN
Qty: 60 TABLET | Refills: 2 | Status: SHIPPED | OUTPATIENT
Start: 2022-01-18

## 2022-01-18 RX ORDER — ALPRAZOLAM 1 MG/1
1 TABLET ORAL 3 TIMES DAILY PRN
Qty: 75 TABLET | Refills: 2 | Status: SHIPPED | OUTPATIENT
Start: 2022-01-18 | End: 2022-02-17

## 2022-01-18 RX ORDER — LANCETS 30 GAUGE
EACH MISCELLANEOUS
Qty: 100 EACH | Refills: 3 | Status: SHIPPED | OUTPATIENT
Start: 2022-01-18 | End: 2022-04-27

## 2022-01-18 RX ORDER — GABAPENTIN 400 MG/1
800 CAPSULE ORAL 3 TIMES DAILY
Qty: 180 CAPSULE | Refills: 1 | Status: CANCELLED | OUTPATIENT
Start: 2022-01-18 | End: 2023-01-18

## 2022-01-18 RX ORDER — PEN NEEDLE, DIABETIC 31 G X1/4"
1 NEEDLE, DISPOSABLE MISCELLANEOUS DAILY
Qty: 100 EACH | Refills: 3 | Status: SHIPPED | OUTPATIENT
Start: 2022-01-18 | End: 2022-04-27

## 2022-01-18 RX ORDER — ESCITALOPRAM OXALATE 20 MG/1
TABLET ORAL
Qty: 90 TABLET | Refills: 1 | Status: SHIPPED | OUTPATIENT
Start: 2022-01-18 | End: 2022-03-14

## 2022-01-18 SDOH — ECONOMIC STABILITY: FOOD INSECURITY: WITHIN THE PAST 12 MONTHS, YOU WORRIED THAT YOUR FOOD WOULD RUN OUT BEFORE YOU GOT MONEY TO BUY MORE.: OFTEN TRUE

## 2022-01-18 SDOH — ECONOMIC STABILITY: FOOD INSECURITY: WITHIN THE PAST 12 MONTHS, THE FOOD YOU BOUGHT JUST DIDN'T LAST AND YOU DIDN'T HAVE MONEY TO GET MORE.: OFTEN TRUE

## 2022-01-18 ASSESSMENT — SOCIAL DETERMINANTS OF HEALTH (SDOH): HOW HARD IS IT FOR YOU TO PAY FOR THE VERY BASICS LIKE FOOD, HOUSING, MEDICAL CARE, AND HEATING?: HARD
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
## 2022-01-18 NOTE — LETTER
CONTROLLED SUBSTANCE MEDICATION AGREEMENT     Patient Name: Janet Salinas  Patient YOB: 1955      Tatianna Hearn    I understand, that controlled substance medications may be used to help better manage my symptoms and to improve my ability to function at home, work and in social settings. However, I also understand that these medications do have risks, which have been discussed with me, including possible development of physical or psychological dependence. I understand that successful treatment requires mutual trust and honesty between me and my provider. I understand and agree that following this Medication Agreement is necessary in continuing my provider-patient relationship and the success of my treatment plan. Explanation from my Provider: Benefits and Goals of Controlled Substance Medications: There are two potential goals for your treatment: (1) decreased pain and suffering (2) improved daily life functions. There are many possible treatments for your chronic condition(s). Alternatives such as physical therapy, yoga, massage, home daily exercise, meditation, relaxation techniques, injections, chiropractic manipulations, surgery, cognitive therapy, hypnosis and many medications that are not habit-forming may be used. Use of controlled substance medications may be helpful, but they are unlikely to resolve all symptoms or restore all function. Explanation from my Provider: Risks of Controlled Substance Medications:  Opioid pain medications: These medications can lead to problems such as addiction/dependence, sedation, lightheadedness/dizziness, memory issues, falls, constipation, nausea, or vomiting. They may also impair the ability to drive or operate machinery. Additionally, these medications may lower testosterone levels, leading to loss of bone strength, stamina and sex drive.   They may cause problems with breathing, sleep apnea and reduced coughing, which is especially dangerous for patients with lung disease. Overdose or dangerous interactions with alcohol and other medications may occur, leading to death. Hyperalgesia may develop, which means patients receiving opioids for the treatment of pain may become more sensitive to certain painful stimuli, and in some cases, experience pain from ordinarily non-painful stimuli. Women between the ages of 14-53 who could become pregnant should carefully weigh the risks and benefits of opioids with their physicians, as these medications increase the risk of pregnancy complications, including miscarriage,  delivery and stillbirth. It is also possible for babies to be born addicted to opioids. Opioid dependence withdrawal symptoms may include; feelings of uneasiness, increased pain, irritability, belly pain, diarrhea, sweats and goose-flesh. Benzodiazepines and non-benzodiazepine sleep medications: These medications can lead to problems such as addiction/dependence, sedation, fatigue, lightheadedness, dizziness, incoordination, falls, depression, hallucinations, and impaired judgment, memory and concentration. The ability to drive and operate machinery may also be affected. Abnormal sleep-related behaviors have been reported, including sleepwalking, driving, making telephone calls, eating, or having sex while not fully awake. These medications can suppress breathing and worsen sleep apnea, particularly when combined with alcohol or other sedating medications, potentially leading to death. Dependence withdrawal symptoms may include tremors, anxiety, hallucinations and seizures. Stimulants:  Common adverse effects include addiction/dependence, increased blood  pressure and heart rate, decreased appetite, nausea, involuntary weight loss, insomnia,                                                                                                                     Initials:_______   irritability, and headaches.   These risks may increase when these medications are combined with other stimulants, such as caffeine pills or energy drinks, certain weight loss supplements and oral decongestants. Dependence withdrawal symptoms may include depressed mood, loss of interest, suicidal thoughts, anxiety, fatigue, appetite changes and agitation. Testosterone replacement therapy:  Potential side effects include increased risk of stroke and heart attack, blood clots, increased blood pressure, increased cholesterol, enlarged prostate, sleep apnea, irritability/aggression and other mood disorders, and decreased fertility. I agree and understand that I and my prescriber have the following rights and responsibilities regarding my treatment plan:     1. MY RIGHTS:  To be informed of my treatment and medication plan. To be an active participant in my health and wellbeing. 2. MY RESPONSIBILITY AND UNDERSTANDING FOR USE OF MEDICATIONS   I will take medications at the dose and frequency as directed. For my safety, I will not increase or change how I take my medications without the recommendation of my healthcare provider.  I will actively participate in any program recommended by my provider which may improve function, including social, physical, psychological programs.  I will not take my medications with alcohol or other drugs not prescribed to me. I understand that drinking alcohol with my medications increases the chances of side effects, including reduced breathing rate and could lead to personal injury when operating machinery.  I understand that if I have a history of substance use disorders, including alcohol or other illicit drugs, that I may be at increased risk of addiction to my medications.  I agree to notify my provider immediately if I should become pregnant so that my treatment plan can be adjusted.    I agree and understand that I shall only receive controlled substance medications from the prescriber that signed this agreement unless there is [email or web address]    5. MY RESPONSIBILITY WITH ILLEGAL DRUGS    I will not use illegal or street drugs or another person's prescription medications not prescribed to me.  If there are identified addiction type symptoms, then referral to a program may be provided by my provider and I agree to follow through with this recommendation. 6. MY RESPONSIBILITY FOR COOPERATION WITH INVESTIGATIONS   I understand that my provider will comply with any applicable law and may discuss my use and/or possible misuse/abuse of controlled substances and alcohol, as appropriate, with any health care provider involved in my care, pharmacist, or legal authority.  I authorize my provider and pharmacy to cooperate fully with law enforcement agencies (as permitted by law) in the investigation of any possible misuse, sale, or other diversion of my controlled substances.  I agree to waive any applicable privilege or right of privacy or confidentiality with respect to these authorizations. 7. PROVIDERS RIGHT TO MONITOR FOR SAFETY: PRESCRIPTION MONITORING / DRUG TESTING   I consent to drug/toxicology screening and will submit to a drug screen upon my providers request to assure I am only taking the prescribed drugs for my safety monitoring. I understand that a drug screen is a laboratory test in which a sample of my urine, blood or saliva is checked to see what drugs I have been taking. This may entail an observed urine specimen, which means that a nurse or other health care provider may watch me provide urine, and I will cooperate if I am asked to provide an observed specimen.  I understand that my provider will check a copy of my State Prescription Monitoring Program () Report in order to safely prescribe medications.  Pill Counts: I consent to pill counts when requested.   I may be asked to bring all my prescribed controlled substance medications, in their original bottles, to all of my scheduled appointments. In addition, my provider may ask me to come to the practice at any time for a random pill count. 8. TERMINATION OF THIS AGREEMENT  For my safety, my prescriber has the right to stop prescribing controlled substance medications and may end this agreement. Initials:_______   Conditions that may result in termination of this agreement:  a. I do not show any improvement in pain, or my activity has not improved. b. I develop rapid tolerance or loss of improvement, as described in my treatment plan.  c. I develop significant side effects from the medication. d. My behavior is not consistent with the responsibilities outlined above, thereby causing safety concerns to continue prescribing controlled substance medications. e. I fail to follow the terms of this agreement. f. Other:____________________________       UNDERSTANDING THIS MEDICATION AGREEMENT:    I have read the above and have had all my questions answered. For chronic disease management, I know that my symptoms can be managed with many types of treatments. A chronic medication trial may be part of my treatment, but I must be an active participant in my care. Medication therapy is only one part of my symptom management plan. In some cases, there may be limited scientific evidence to support the chronic use of certain medications to improve symptoms and daily function. Furthermore, in certain circumstances, there may be scientific information that suggests that the use of chronic controlled substances may worsen my symptoms and increase my risk of unintentional death directly related to this medication therapy. I know that if my provider feels my risk from controlled medications is greater than my benefit, I will have my controlled substance medication(s) compassionately lowered or removed altogether.      I further agree to allow this office to contact my HIPAA contact if there are concerns about my safety and use of the controlled medications. I have agreed to use the prescribed controlled substance medications to me as instructed by my provider and as stated in this Medication Agreement. My initial on each page and my signature below shows that I have read each page and I have had the opportunity to ask questions with answers provided by my provider.     Patient Name (Printed): _____________________________________  Patient Signature:  ______________________   Date: _____________    Prescriber Name (Printed): ___________________________________  Prescriber Signature: _____________________  Date: _____________

## 2022-01-31 ENCOUNTER — VIRTUAL VISIT (OUTPATIENT)
Dept: FAMILY MEDICINE CLINIC | Age: 67
End: 2022-01-31
Payer: MEDICARE

## 2022-01-31 DIAGNOSIS — Z00.00 ROUTINE GENERAL MEDICAL EXAMINATION AT A HEALTH CARE FACILITY: Primary | ICD-10-CM

## 2022-01-31 PROBLEM — E78.2 MIXED HYPERLIPIDEMIA DUE TO TYPE 2 DIABETES MELLITUS (HCC): Status: ACTIVE | Noted: 2017-09-28

## 2022-01-31 PROBLEM — K11.8 MASS OF BOTH PAROTID GLANDS: Status: ACTIVE | Noted: 2021-02-19

## 2022-01-31 PROBLEM — E11.69 MIXED HYPERLIPIDEMIA DUE TO TYPE 2 DIABETES MELLITUS (HCC): Status: ACTIVE | Noted: 2017-09-28

## 2022-01-31 PROBLEM — K11.21 ACUTE PAROTITIS: Status: ACTIVE | Noted: 2021-02-20

## 2022-01-31 PROBLEM — E87.1 HYPONATREMIA: Status: ACTIVE | Noted: 2021-02-19

## 2022-01-31 PROBLEM — R94.39 ABNORMAL STRESS TEST: Status: ACTIVE | Noted: 2021-07-21

## 2022-01-31 PROBLEM — I20.0 UNSTABLE ANGINA (HCC): Status: ACTIVE | Noted: 2021-07-21

## 2022-01-31 PROCEDURE — G0439 PPPS, SUBSEQ VISIT: HCPCS | Performed by: FAMILY MEDICINE

## 2022-01-31 ASSESSMENT — PATIENT HEALTH QUESTIONNAIRE - PHQ9
SUM OF ALL RESPONSES TO PHQ QUESTIONS 1-9: 6
SUM OF ALL RESPONSES TO PHQ QUESTIONS 1-9: 6
6. FEELING BAD ABOUT YOURSELF - OR THAT YOU ARE A FAILURE OR HAVE LET YOURSELF OR YOUR FAMILY DOWN: 0
2. FEELING DOWN, DEPRESSED OR HOPELESS: 2
8. MOVING OR SPEAKING SO SLOWLY THAT OTHER PEOPLE COULD HAVE NOTICED. OR THE OPPOSITE, BEING SO FIGETY OR RESTLESS THAT YOU HAVE BEEN MOVING AROUND A LOT MORE THAN USUAL: 0
4. FEELING TIRED OR HAVING LITTLE ENERGY: 0
SUM OF ALL RESPONSES TO PHQ QUESTIONS 1-9: 6
3. TROUBLE FALLING OR STAYING ASLEEP: 0
9. THOUGHTS THAT YOU WOULD BE BETTER OFF DEAD, OR OF HURTING YOURSELF: 0
1. LITTLE INTEREST OR PLEASURE IN DOING THINGS: 3
10. IF YOU CHECKED OFF ANY PROBLEMS, HOW DIFFICULT HAVE THESE PROBLEMS MADE IT FOR YOU TO DO YOUR WORK, TAKE CARE OF THINGS AT HOME, OR GET ALONG WITH OTHER PEOPLE: 0
7. TROUBLE CONCENTRATING ON THINGS, SUCH AS READING THE NEWSPAPER OR WATCHING TELEVISION: 0
SUM OF ALL RESPONSES TO PHQ QUESTIONS 1-9: 6
SUM OF ALL RESPONSES TO PHQ9 QUESTIONS 1 & 2: 5
5. POOR APPETITE OR OVEREATING: 1

## 2022-01-31 ASSESSMENT — ENCOUNTER SYMPTOMS
COUGH: 0
RHINORRHEA: 1
SHORTNESS OF BREATH: 0
VOMITING: 0
BACK PAIN: 1
ABDOMINAL PAIN: 0
EYE DISCHARGE: 0
EYE PAIN: 0
NAUSEA: 0
CONSTIPATION: 0
DIARRHEA: 0

## 2022-01-31 ASSESSMENT — LIFESTYLE VARIABLES: HOW OFTEN DO YOU HAVE A DRINK CONTAINING ALCOHOL: 0
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
## 2022-01-31 NOTE — PATIENT INSTRUCTIONS
Personalized Preventive Plan for Morgan Gonzales - 1/31/2022  Medicare offers a range of preventive health benefits. Some of the tests and screenings are paid in full while other may be subject to a deductible, co-insurance, and/or copay. Some of these benefits include a comprehensive review of your medical history including lifestyle, illnesses that may run in your family, and various assessments and screenings as appropriate. After reviewing your medical record and screening and assessments performed today your provider may have ordered immunizations, labs, imaging, and/or referrals for you. A list of these orders (if applicable) as well as your Preventive Care list are included within your After Visit Summary for your review. Other Preventive Recommendations:    · A preventive eye exam performed by an eye specialist is recommended every 1-2 years to screen for glaucoma; cataracts, macular degeneration, and other eye disorders. · A preventive dental visit is recommended every 6 months. · Try to get at least 150 minutes of exercise per week or 10,000 steps per day on a pedometer . · Order or download the FREE \"Exercise & Physical Activity: Your Everyday Guide\" from [employer]. Call 0-414.834.4194 or search [employer] online. · You need 2757-7545 mg of calcium and 4648-0165 IU of vitamin D per day. It is possible to meet your calcium requirement with diet alone, but a vitamin D supplement is usually necessary to meet this goal.  · When exposed to the sun, use a sunscreen that protects against both UVA and UVB radiation with an SPF of 30 or greater. Reapply every 2 to 3 hours or after sweating, drying off with a towel, or swimming. · Always wear a seat belt when traveling in a car. Always wear a helmet when riding a bicycle or motorcycle. Heart-Healthy Diet   Sodium, Fat, and Cholesterol Controlled Diet       What Is a Heart Healthy Diet?    A heart-healthy diet is one that limits sodium , certain types of fat , and cholesterol . This type of diet is recommended for:   People with any form of cardiovascular disease (eg, coronary heart disease , peripheral vascular disease , previous heart attack , previous stroke )   People with risk factors for cardiovascular disease, such as high blood pressure , high cholesterol , or diabetes   Anyone who wants to lower their risk of developing cardiovascular disease   Sodium    Sodium is a mineral found in many foods. In general, most people consume much more sodium than they need. Diets high in sodium can increase blood pressure and lead to edema (water retention). On a heart-healthy diet, you should consume no more than 2,300 mg (milligrams) of sodium per dayabout the amount in one teaspoon of table salt. The foods highest in sodium include table salt (about 50% sodium), processed foods, convenience foods, and preserved foods. Cholesterol    Cholesterol is a fat-like, waxy substance in your blood. Our bodies make some cholesterol. It is also found in animal products, with the highest amounts in fatty meat, egg yolks, whole milk, cheese, shellfish, and organ meats. On a heart-healthy diet, you should limit your cholesterol intake to less than 200 mg per day. It is normal and important to have some cholesterol in your bloodstream. But too much cholesterol can cause plaque to build up within your arteries, which can eventually lead to a heart attack or stroke. The two types of cholesterol that are most commonly referred to are:   Low-density lipoprotein (LDL) cholesterol  Also known as bad cholesterol, this is the cholesterol that tends to build up along your arteries. Bad cholesterol levels are increased by eating fats that are saturated or hydrogenated. Optimal level of this cholesterol is less than 100. Over 130 starts to get risky for heart disease.    High-density lipoprotein (HDL) cholesterol  Also known as good cholesterol, this type of cholesterol actually carries cholesterol away from your arteries and may, therefore, help lower your risk of having a heart attack. You want this level to be high (ideally greater than 60). It is a risk to have a level less than 40. You can raise this good cholesterol by eating olive oil, canola oil, avocados, or nuts. Exercise raises this level, too. Fat    Fat is calorie dense and packs a lot of calories into a small amount of food. Even though fats should be limited due to their high calorie content, not all fats are bad. In fact, some fats are quite healthful. Fat can be broken down into four main types. The good-for-you fats are:   Monounsaturated fat  found in oils such as olive and canola, avocados, and nuts and natural nut butters; can decrease cholesterol levels, while keeping levels of HDL cholesterol high   Polyunsaturated fat  found in oils such as safflower, sunflower, soybean, corn, and sesame; can decrease total cholesterol and LDL cholesterol   Omega-3 fatty acids  particularly those found in fatty fish (such as salmon, trout, tuna, mackerel, herring, and sardines); can decrease risk of arrhythmias, decrease triglyceride levels, and slightly lower blood pressure   The fats that you want to limit are:   Saturated fat  found in animal products, many fast foods, and a few vegetables; increases total blood cholesterol, including LDL levels   Animal fats that are saturated include: butter, lard, whole-milk dairy products, meat fat, and poultry skin   Vegetable fats that are saturated include: hydrogenated shortening, palm oil, coconut oil, cocoa butter   Hydrogenated or trans fat  found in margarine and vegetable shortening, most shelf stable snack foods, and fried foods; increases LDL and decreases HDL     It is generally recommended that you limit your total fat for the day to less than 30% of your total calories.  If you follow an 1800-calorie heart healthy diet, for example, this would mean 60 grams of fat or less per day. Saturated fat and trans fat in your diet raises your blood cholesterol the most, much more than dietary cholesterol does. For this reason, on a heart-healthy diet, less than 7% of your calories should come from saturated fat and ideally 0% from trans fat. On an 1800-calorie diet, this translates into less than 14 grams of saturated fat per day, leaving 46 grams of fat to come from mono- and polyunsaturated fats.    Food Choices on a Heart Healthy Diet   Food Category   Foods Recommended   Foods to Avoid   Grains   Breads and rolls without salted tops Most dry and cooked cereals Unsalted crackers and breadsticks Low-sodium or homemade breadcrumbs or stuffing All rice and pastas   Breads, rolls, and crackers with salted tops High-fat baked goods (eg, muffins, donuts, pastries) Quick breads, self-rising flour, and biscuit mixes Regular bread crumbs Instant hot cereals Commercially prepared rice, pasta, or stuffing mixes   Vegetables   Most fresh, frozen, and low-sodium canned vegetables Low-sodium and salt-free vegetable juices Canned vegetables if unsalted or rinsed   Regular canned vegetables and juices, including sauerkraut and pickled vegetables Frozen vegetables with sauces Commercially prepared potato and vegetable mixes   Fruits   Most fresh, frozen, and canned fruits All fruit juices   Fruits processed with salt or sodium   Milk   Nonfat or low-fat (1%) milk Nonfat or low-fat yogurt Cottage cheese, low-fat ricotta, cheeses labeled as low-fat and low-sodium   Whole milk Reduced-fat (2%) milk Malted and chocolate milk Full fat yogurt Most cheeses (unless low-fat and low salt) Buttermilk (no more than 1 cup per week)   Meats and Beans   Lean cuts of fresh or frozen beef, veal, lamb, or pork (look for the word loin) Fresh or frozen poultry without the skin Fresh or frozen fish and some shellfish Egg whites and egg substitutes (Limit whole eggs to three per week) Tofu Nuts or seeds (unsalted, dry-roasted), low-sodium peanut butter Dried peas, beans, and lentils   Any smoked, cured, salted, or canned meat, fish, or poultry (including lau, chipped beef, cold cuts, hot dogs, sausages, sardines, and anchovies) Poultry skins Breaded and/or fried fish or meats Canned peas, beans, and lentils Salted nuts   Fats and Oils   Olive oil and canola oil Low-sodium, low-fat salad dressings and mayonnaise   Butter, margarine, coconut and palm oils, lau fat   Snacks, Sweets, and Condiments   Low-sodium or unsalted versions of broths, soups, soy sauce, and condiments Pepper, herbs, and spices; vinegar, lemon, or lime juice Low-fat frozen desserts (yogurt, sherbet, fruit bars) Sugar, cocoa powder, honey, syrup, jam, and preserves Low-fat, trans-fat free cookies, cakes, and pies Ej and animal crackers, fig bars, deonte snaps   High-fat desserts Broth, soups, gravies, and sauces, made from instant mixes or other high-sodium ingredients Salted snack foods Canned olives Meat tenderizers, seasoning salt, and most flavored vinegars   Beverages   Low-sodium carbonated beverages Tea and coffee in moderation Soy milk   Commercially softened water   Suggestions   Make whole grains, fruits, and vegetables the base of your diet. Choose heart-healthy fats such as canola, olive, and flaxseed oil, and foods high in heart-healthy fats, such as nuts, seeds, soybeans, tofu, and fish. Eat fish at least twice per week; the fish highest in omega-3 fatty acids and lowest in mercury include salmon, herring, mackerel, sardines, and canned chunk light tuna. If you eat fish less than twice per week or have high triglycerides, talk to your doctor about taking fish oil supplements. Read food labels.    For products low in fat and cholesterol, look for fat free, low-fat, cholesterol free, saturated fat free, and trans fat freeAlso scan the Nutrition Facts Label, which lists saturated fat, trans fat, and cholesterol amounts. For products low in sodium, look for sodium free, very low sodium, low sodium, no added salt, and unsalted   Skip the salt when cooking or at the table; if food needs more flavor, get creative and try out different herbs and spices. Garlic and onion also add substantial flavor to foods. Trim any visible fat off meat and poultry before cooking, and drain the fat off after solo. Use cooking methods that require little or no added fat, such as grilling, boiling, baking, poaching, broiling, roasting, steaming, stir-frying, and sauting. Avoid fast food and convenience food. They tend to be high in saturated and trans fat and have a lot of added salt. Talk to a registered dietitian for individualized diet advice. Last Reviewed: March 2011 Marimar Smith MS, MPH, RD   Updated: 3/29/2011   ·     Heart-Healthy Diet   Sodium, Fat, and Cholesterol Controlled Diet       What Is a Heart Healthy Diet? A heart-healthy diet is one that limits sodium , certain types of fat , and cholesterol . This type of diet is recommended for:   People with any form of cardiovascular disease (eg, coronary heart disease , peripheral vascular disease , previous heart attack , previous stroke )   People with risk factors for cardiovascular disease, such as high blood pressure , high cholesterol , or diabetes   Anyone who wants to lower their risk of developing cardiovascular disease   Sodium    Sodium is a mineral found in many foods. In general, most people consume much more sodium than they need. Diets high in sodium can increase blood pressure and lead to edema (water retention). On a heart-healthy diet, you should consume no more than 2,300 mg (milligrams) of sodium per dayabout the amount in one teaspoon of table salt. The foods highest in sodium include table salt (about 50% sodium), processed foods, convenience foods, and preserved foods.    Cholesterol    Cholesterol is a fat-like, waxy substance in your blood. Our bodies make some cholesterol. It is also found in animal products, with the highest amounts in fatty meat, egg yolks, whole milk, cheese, shellfish, and organ meats. On a heart-healthy diet, you should limit your cholesterol intake to less than 200 mg per day. It is normal and important to have some cholesterol in your bloodstream. But too much cholesterol can cause plaque to build up within your arteries, which can eventually lead to a heart attack or stroke. The two types of cholesterol that are most commonly referred to are:   Low-density lipoprotein (LDL) cholesterol  Also known as bad cholesterol, this is the cholesterol that tends to build up along your arteries. Bad cholesterol levels are increased by eating fats that are saturated or hydrogenated. Optimal level of this cholesterol is less than 100. Over 130 starts to get risky for heart disease. High-density lipoprotein (HDL) cholesterol  Also known as good cholesterol, this type of cholesterol actually carries cholesterol away from your arteries and may, therefore, help lower your risk of having a heart attack. You want this level to be high (ideally greater than 60). It is a risk to have a level less than 40. You can raise this good cholesterol by eating olive oil, canola oil, avocados, or nuts. Exercise raises this level, too. Fat    Fat is calorie dense and packs a lot of calories into a small amount of food. Even though fats should be limited due to their high calorie content, not all fats are bad. In fact, some fats are quite healthful. Fat can be broken down into four main types.    The good-for-you fats are:   Monounsaturated fat  found in oils such as olive and canola, avocados, and nuts and natural nut butters; can decrease cholesterol levels, while keeping levels of HDL cholesterol high   Polyunsaturated fat  found in oils such as safflower, sunflower, soybean, corn, and sesame; can decrease total cholesterol and LDL cholesterol   Omega-3 fatty acids  particularly those found in fatty fish (such as salmon, trout, tuna, mackerel, herring, and sardines); can decrease risk of arrhythmias, decrease triglyceride levels, and slightly lower blood pressure   The fats that you want to limit are:   Saturated fat  found in animal products, many fast foods, and a few vegetables; increases total blood cholesterol, including LDL levels   Animal fats that are saturated include: butter, lard, whole-milk dairy products, meat fat, and poultry skin   Vegetable fats that are saturated include: hydrogenated shortening, palm oil, coconut oil, cocoa butter   Hydrogenated or trans fat  found in margarine and vegetable shortening, most shelf stable snack foods, and fried foods; increases LDL and decreases HDL     It is generally recommended that you limit your total fat for the day to less than 30% of your total calories. If you follow an 1800-calorie heart healthy diet, for example, this would mean 60 grams of fat or less per day. Saturated fat and trans fat in your diet raises your blood cholesterol the most, much more than dietary cholesterol does. For this reason, on a heart-healthy diet, less than 7% of your calories should come from saturated fat and ideally 0% from trans fat. On an 1800-calorie diet, this translates into less than 14 grams of saturated fat per day, leaving 46 grams of fat to come from mono- and polyunsaturated fats.    Food Choices on a Heart Healthy Diet   Food Category   Foods Recommended   Foods to Avoid   Grains   Breads and rolls without salted tops Most dry and cooked cereals Unsalted crackers and breadsticks Low-sodium or homemade breadcrumbs or stuffing All rice and pastas   Breads, rolls, and crackers with salted tops High-fat baked goods (eg, muffins, donuts, pastries) Quick breads, self-rising flour, and biscuit mixes Regular bread crumbs Instant hot cereals Commercially prepared rice, pasta, or stuffing mixes Vegetables   Most fresh, frozen, and low-sodium canned vegetables Low-sodium and salt-free vegetable juices Canned vegetables if unsalted or rinsed   Regular canned vegetables and juices, including sauerkraut and pickled vegetables Frozen vegetables with sauces Commercially prepared potato and vegetable mixes   Fruits   Most fresh, frozen, and canned fruits All fruit juices   Fruits processed with salt or sodium   Milk   Nonfat or low-fat (1%) milk Nonfat or low-fat yogurt Cottage cheese, low-fat ricotta, cheeses labeled as low-fat and low-sodium   Whole milk Reduced-fat (2%) milk Malted and chocolate milk Full fat yogurt Most cheeses (unless low-fat and low salt) Buttermilk (no more than 1 cup per week)   Meats and Beans   Lean cuts of fresh or frozen beef, veal, lamb, or pork (look for the word loin) Fresh or frozen poultry without the skin Fresh or frozen fish and some shellfish Egg whites and egg substitutes (Limit whole eggs to three per week) Tofu Nuts or seeds (unsalted, dry-roasted), low-sodium peanut butter Dried peas, beans, and lentils   Any smoked, cured, salted, or canned meat, fish, or poultry (including lau, chipped beef, cold cuts, hot dogs, sausages, sardines, and anchovies) Poultry skins Breaded and/or fried fish or meats Canned peas, beans, and lentils Salted nuts   Fats and Oils   Olive oil and canola oil Low-sodium, low-fat salad dressings and mayonnaise   Butter, margarine, coconut and palm oils, lau fat   Snacks, Sweets, and Condiments   Low-sodium or unsalted versions of broths, soups, soy sauce, and condiments Pepper, herbs, and spices; vinegar, lemon, or lime juice Low-fat frozen desserts (yogurt, sherbet, fruit bars) Sugar, cocoa powder, honey, syrup, jam, and preserves Low-fat, trans-fat free cookies, cakes, and pies Ej and animal crackers, fig bars, deonte snaps   High-fat desserts Broth, soups, gravies, and sauces, made from instant mixes or other high-sodium ingredients Salted snack foods Canned olives Meat tenderizers, seasoning salt, and most flavored vinegars   Beverages   Low-sodium carbonated beverages Tea and coffee in moderation Soy milk   Commercially softened water   Suggestions   Make whole grains, fruits, and vegetables the base of your diet. Choose heart-healthy fats such as canola, olive, and flaxseed oil, and foods high in heart-healthy fats, such as nuts, seeds, soybeans, tofu, and fish. Eat fish at least twice per week; the fish highest in omega-3 fatty acids and lowest in mercury include salmon, herring, mackerel, sardines, and canned chunk light tuna. If you eat fish less than twice per week or have high triglycerides, talk to your doctor about taking fish oil supplements. Read food labels. For products low in fat and cholesterol, look for fat free, low-fat, cholesterol free, saturated fat free, and trans fat freeAlso scan the Nutrition Facts Label, which lists saturated fat, trans fat, and cholesterol amounts. For products low in sodium, look for sodium free, very low sodium, low sodium, no added salt, and unsalted   Skip the salt when cooking or at the table; if food needs more flavor, get creative and try out different herbs and spices. Garlic and onion also add substantial flavor to foods. Trim any visible fat off meat and poultry before cooking, and drain the fat off after solo. Use cooking methods that require little or no added fat, such as grilling, boiling, baking, poaching, broiling, roasting, steaming, stir-frying, and sauting. Avoid fast food and convenience food. They tend to be high in saturated and trans fat and have a lot of added salt. Talk to a registered dietitian for individualized diet advice. Last Reviewed: March 2011 Cassandra Bradford MS, MPH, RD   Updated: 3/29/2011   ·     Heart-Healthy Diet   Sodium, Fat, and Cholesterol Controlled Diet       What Is a Heart Healthy Diet?    A heart-healthy diet is one that limits sodium , certain types of fat , and cholesterol . This type of diet is recommended for:   People with any form of cardiovascular disease (eg, coronary heart disease , peripheral vascular disease , previous heart attack , previous stroke )   People with risk factors for cardiovascular disease, such as high blood pressure , high cholesterol , or diabetes   Anyone who wants to lower their risk of developing cardiovascular disease   Sodium    Sodium is a mineral found in many foods. In general, most people consume much more sodium than they need. Diets high in sodium can increase blood pressure and lead to edema (water retention). On a heart-healthy diet, you should consume no more than 2,300 mg (milligrams) of sodium per dayabout the amount in one teaspoon of table salt. The foods highest in sodium include table salt (about 50% sodium), processed foods, convenience foods, and preserved foods. Cholesterol    Cholesterol is a fat-like, waxy substance in your blood. Our bodies make some cholesterol. It is also found in animal products, with the highest amounts in fatty meat, egg yolks, whole milk, cheese, shellfish, and organ meats. On a heart-healthy diet, you should limit your cholesterol intake to less than 200 mg per day. It is normal and important to have some cholesterol in your bloodstream. But too much cholesterol can cause plaque to build up within your arteries, which can eventually lead to a heart attack or stroke. The two types of cholesterol that are most commonly referred to are:   Low-density lipoprotein (LDL) cholesterol  Also known as bad cholesterol, this is the cholesterol that tends to build up along your arteries. Bad cholesterol levels are increased by eating fats that are saturated or hydrogenated. Optimal level of this cholesterol is less than 100. Over 130 starts to get risky for heart disease.    High-density lipoprotein (HDL) cholesterol  Also known as good cholesterol, this type of cholesterol actually carries cholesterol away from your arteries and may, therefore, help lower your risk of having a heart attack. You want this level to be high (ideally greater than 60). It is a risk to have a level less than 40. You can raise this good cholesterol by eating olive oil, canola oil, avocados, or nuts. Exercise raises this level, too. Fat    Fat is calorie dense and packs a lot of calories into a small amount of food. Even though fats should be limited due to their high calorie content, not all fats are bad. In fact, some fats are quite healthful. Fat can be broken down into four main types. The good-for-you fats are:   Monounsaturated fat  found in oils such as olive and canola, avocados, and nuts and natural nut butters; can decrease cholesterol levels, while keeping levels of HDL cholesterol high   Polyunsaturated fat  found in oils such as safflower, sunflower, soybean, corn, and sesame; can decrease total cholesterol and LDL cholesterol   Omega-3 fatty acids  particularly those found in fatty fish (such as salmon, trout, tuna, mackerel, herring, and sardines); can decrease risk of arrhythmias, decrease triglyceride levels, and slightly lower blood pressure   The fats that you want to limit are:   Saturated fat  found in animal products, many fast foods, and a few vegetables; increases total blood cholesterol, including LDL levels   Animal fats that are saturated include: butter, lard, whole-milk dairy products, meat fat, and poultry skin   Vegetable fats that are saturated include: hydrogenated shortening, palm oil, coconut oil, cocoa butter   Hydrogenated or trans fat  found in margarine and vegetable shortening, most shelf stable snack foods, and fried foods; increases LDL and decreases HDL     It is generally recommended that you limit your total fat for the day to less than 30% of your total calories.  If you follow an 1800-calorie heart healthy diet, for example, this would mean 60 grams of fat or less per day. Saturated fat and trans fat in your diet raises your blood cholesterol the most, much more than dietary cholesterol does. For this reason, on a heart-healthy diet, less than 7% of your calories should come from saturated fat and ideally 0% from trans fat. On an 1800-calorie diet, this translates into less than 14 grams of saturated fat per day, leaving 46 grams of fat to come from mono- and polyunsaturated fats.    Food Choices on a Heart Healthy Diet   Food Category   Foods Recommended   Foods to Avoid   Grains   Breads and rolls without salted tops Most dry and cooked cereals Unsalted crackers and breadsticks Low-sodium or homemade breadcrumbs or stuffing All rice and pastas   Breads, rolls, and crackers with salted tops High-fat baked goods (eg, muffins, donuts, pastries) Quick breads, self-rising flour, and biscuit mixes Regular bread crumbs Instant hot cereals Commercially prepared rice, pasta, or stuffing mixes   Vegetables   Most fresh, frozen, and low-sodium canned vegetables Low-sodium and salt-free vegetable juices Canned vegetables if unsalted or rinsed   Regular canned vegetables and juices, including sauerkraut and pickled vegetables Frozen vegetables with sauces Commercially prepared potato and vegetable mixes   Fruits   Most fresh, frozen, and canned fruits All fruit juices   Fruits processed with salt or sodium   Milk   Nonfat or low-fat (1%) milk Nonfat or low-fat yogurt Cottage cheese, low-fat ricotta, cheeses labeled as low-fat and low-sodium   Whole milk Reduced-fat (2%) milk Malted and chocolate milk Full fat yogurt Most cheeses (unless low-fat and low salt) Buttermilk (no more than 1 cup per week)   Meats and Beans   Lean cuts of fresh or frozen beef, veal, lamb, or pork (look for the word loin) Fresh or frozen poultry without the skin Fresh or frozen fish and some shellfish Egg whites and egg substitutes (Limit whole eggs to three per week) Tofu Nuts or seeds (unsalted, dry-roasted), low-sodium peanut butter Dried peas, beans, and lentils   Any smoked, cured, salted, or canned meat, fish, or poultry (including lau, chipped beef, cold cuts, hot dogs, sausages, sardines, and anchovies) Poultry skins Breaded and/or fried fish or meats Canned peas, beans, and lentils Salted nuts   Fats and Oils   Olive oil and canola oil Low-sodium, low-fat salad dressings and mayonnaise   Butter, margarine, coconut and palm oils, lau fat   Snacks, Sweets, and Condiments   Low-sodium or unsalted versions of broths, soups, soy sauce, and condiments Pepper, herbs, and spices; vinegar, lemon, or lime juice Low-fat frozen desserts (yogurt, sherbet, fruit bars) Sugar, cocoa powder, honey, syrup, jam, and preserves Low-fat, trans-fat free cookies, cakes, and pies Ej and animal crackers, fig bars, deonte snaps   High-fat desserts Broth, soups, gravies, and sauces, made from instant mixes or other high-sodium ingredients Salted snack foods Canned olives Meat tenderizers, seasoning salt, and most flavored vinegars   Beverages   Low-sodium carbonated beverages Tea and coffee in moderation Soy milk   Commercially softened water   Suggestions   Make whole grains, fruits, and vegetables the base of your diet. Choose heart-healthy fats such as canola, olive, and flaxseed oil, and foods high in heart-healthy fats, such as nuts, seeds, soybeans, tofu, and fish. Eat fish at least twice per week; the fish highest in omega-3 fatty acids and lowest in mercury include salmon, herring, mackerel, sardines, and canned chunk light tuna. If you eat fish less than twice per week or have high triglycerides, talk to your doctor about taking fish oil supplements. Read food labels. For products low in fat and cholesterol, look for fat free, low-fat, cholesterol free, saturated fat free, and trans fat freeAlso scan the Nutrition Facts Label, which lists saturated fat, trans fat, and cholesterol amounts. For products low in sodium, look for sodium free, very low sodium, low sodium, no added salt, and unsalted   Skip the salt when cooking or at the table; if food needs more flavor, get creative and try out different herbs and spices. Garlic and onion also add substantial flavor to foods. Trim any visible fat off meat and poultry before cooking, and drain the fat off after solo. Use cooking methods that require little or no added fat, such as grilling, boiling, baking, poaching, broiling, roasting, steaming, stir-frying, and sauting. Avoid fast food and convenience food. They tend to be high in saturated and trans fat and have a lot of added salt. Talk to a registered dietitian for individualized diet advice. Last Reviewed: March 2011 Otis Benavides MS, MPH, RD   Updated: 3/29/2011   ·     Preventing Osteoporosis: After Your Visit  Your Care Instructions  Osteoporosis means the bones are weak and thin enough that they can break easily. The older you are, the more likely you are to get osteoporosis. But with plenty of calcium, vitamin D, and exercise, you can help prevent osteoporosis. The preteen and teen years are a key time for bone building. With the help of calcium, vitamin D, and exercise in those early years and beyond, the bones reach their peak density and strength by age 27. After age 27, your bones naturally start to thin and weaken. The stronger your bones are at around age 27, the lower your risk for osteoporosis. But no matter what your age and risk are, your bones still need calcium, vitamin D, and exercise to stay strong. Also avoid smoking, and limit alcohol. Smoking and heavy alcohol use can make your bones thinner. Talk to your doctor about any special risks you might have, such as having a close relative with osteoporosis or taking a medicine that can weaken bones. Your doctor can tell you the best ways to protect your bones from thinning.   Follow-up care is a key part of your treatment and safety. Be sure to make and go to all appointments, and call your doctor if you are having problems. It's also a good idea to know your test results and keep a list of the medicines you take. How can you care for yourself at home? Get enough calcium and vitamin D. The [place] [employer] recommends adults younger than age 46 need 1,000 mg of calcium and 600 IU of vitamin D each day. Women ages 46 to 79 need 1,200 mg of calcium and 600 IU of vitamin D each day. Men ages 46 to 79 need 1,000 mg of calcium and 600 IU of vitamin D each day. Adults 71 and older need 1,200 mg of calcium and 800 IU of vitamin D each day. Eat foods rich in calcium, like yogurt, cheese, milk, and dark green vegetables. Eat foods rich in vitamin D, like eggs, fatty fish, cereal, and fortified milk. Get some sunshine. Your body uses sunshine to make its own vitamin D. The safest time to be out in the sun is before 10 a.m. or after 3 p.m. Avoid getting sunburned. Sunburn can increase your risk of skin cancer. Talk to your doctor about taking a calcium plus vitamin D supplement. Ask about what type of calcium is right for you, and how much to take at a time. Adults ages 23 to 48 should not get more than 2,500 mg of calcium and 4,000 IU of vitamin D each day, whether it is from supplements and/or food. Adults ages 46 and older should not get more than 2,000 mg of calcium and 4,000 IU of vitamin D each day from supplements and/or food. Get regular bone-building exercise. Weight-bearing and resistance exercises keep bones healthy by working the muscles and bones against gravity. Start out at an exercise level that feels right for you. Add a little at a time until you can do the following:  Do 30 minutes of weight-bearing exercise on most days of the week. Walking, jogging, stair climbing, and dancing are good choices. Do resistance exercises with weights or elastic bands 2 to 3 days a week. Limit alcohol.  Drink no more than 1 alcohol drink a day if you are a woman. Drink no more than 2 alcohol drinks a day if you are a man. Do not smoke. Smoking can make bones thin faster. If you need help quitting, talk to your doctor about stop-smoking programs and medicines. These can increase your chances of quitting for good. When should you call for help? Watch closely for changes in your health, and be sure to contact your doctor if:  You need help with a healthy eating plan. You need help with an exercise plan    © 4964-3860 [employer]. Care instructions adapted under license by [facility]. This care instruction is for use with your licensed healthcare professional. If you have questions about a medical condition or this instruction, always ask your healthcare professional. [address] any warranty or liability for your use of this information. Content Version: 9.4.54168; Last Revised: June 20, 2011              ·     Keep Your Memory Rosy Milton       Many factors can affect your ability to remembera hectic lifestyle, aging, stress, chronic disease, and certain medicines. But, there are steps you can take to sharpen your mind and help preserve your memory. Challenge Your Brain   Regularly challenging your mind may help keeps it in top shape. Good mental exercises include:   Crossword puzzlesUse a dictionary if you need it; you will learn more that way. Brainteasers Try some! Crafts, such as wood working and sewing   Hobbies, such as gardening and building model airplanes   SocializingVisit old friends or join groups to meet new ones.    Reading   Learning a new language   Taking a class, whether it be art history or alfonso chi   TravelingExperience the food, history, and culture of your destination   Learning to use a computer   Going to museums, the theater, or thought-provoking movies   Changing things in your daily life, such as reversing your pattern in the grocery store or brushing your teeth using your nondominant hand   Use Memory Aids   There is no need to remember every detail on your own. These memory aids can help:   Calendars and day planners   Electronic organizers to store all sorts of helpful informationThese devices can \"beep\" to remind you of appointments. A book of days to record birthdays, anniversaries, and other occasions that occur on the same date every year   Detailed \"to-do\" lists and strategically placed sticky notes   Quick \"study\" sessionsBefore a gathering, review who will be there so their names will be fresh in your mind. Establish routinesFor example, keep your keys, wallet, and umbrella in the same place all the time or take medicine with your 8:00 AM glass of juice   Live a Healthy Life   Many actions that will keep your body strong will do the same for your mind. For example:   Talk to Your Doctor About Herbs and Supplements    Malnutrition and vitamin deficiencies can impair your mental function. For example, vitamin B12 deficiency can cause a range of symptoms, including confusion. But, what if your nutritional needs are being met? Can herbs and supplements still offer a benefit? Researchers have investigated a range of natural remedies, such as ginkgo , ginseng , and the supplement phosphatidylserine (PS). So far, though, the evidence is inconsistent as to whether these products can improve memory or thinking. If you are interested in taking herbs and supplements, talk to your doctor first because they may interact with other medicines that you are taking. Exercise Regularly    Among the many benefits of regular exercise are increased blood flow to the brain and decreased risk of certain diseases that can interfere with memory function. One study found that even moderate exercise has a beneficial effect.  Examples of \"moderate\" exercise include:   Playing 18 holes of golf once a week, without a cart   Playing tennis twice a week   Walking one mile per day   Manage Stress    It can be tough to remember what is important when your mind is cluttered. Make time for relaxation. Choose activities that calm you down, and make it routine. Manage Chronic Conditions    Side effects of high blood pressure , diabetes, and heart disease can interfere with mental function. Many of the lifestyle steps discussed here can help manage these conditions. Strive to eat a healthy diet, exercise regularly, get stress under control, and follow your doctor's advice for your condition. Minimize Medications    Talk to your doctor about the medicines that you take. Some may be unnecessary. Also, healthy lifestyle habits may lower the need for certain drugs. Last Reviewed: April 2010 Kelvin Sifuentes MD   Updated: 4/13/2010   ·   Smoking Cessation  This document explains the best ways for you to quit smoking and new treatments to help. It lists new medicines that can double or triple your chances of quitting and quitting for good. It also considers ways to avoid relapses and concerns you may have about quitting, including weight gain. NICOTINE: A POWERFUL ADDICTION  If you have tried to quit smoking, you know how hard it can be. It is hard because nicotine is a very addictive drug. For some people, it can be as addictive as heroin or cocaine. Usually, people make 2 or 3 tries, or more, before finally being able to quit. Each time you try to quit, you can learn about what helps and what hurts. Quitting takes hard work and a lot of effort, but you can quit smoking. QUITTING SMOKING IS ONE OF THE MOST IMPORTANT THINGS YOU WILL EVER DO. You will live longer, feel better, and live better. The impact on your body of quitting smoking is felt almost immediately:  Within 20 minutes, blood pressure decreases. Pulse returns to its normal level. After 8 hours, carbon monoxide levels in the blood return to normal. Oxygen level increases.   After 24 hours, chance of heart attack starts to decrease. Breath, hair, and body stop smelling like smoke. After 48 hours, damaged nerve endings begin to recover. Sense of taste and smell improve. After 72 hours, the body is virtually free of nicotine. Bronchial tubes relax and breathing becomes easier. After 2 to 12 weeks, lungs can hold more air. Exercise becomes easier and circulation improves. Quitting will reduce your risk of having a heart attack, stroke, cancer, or lung disease:  After 1 year, the risk of coronary heart disease is cut in half. After 5 years, the risk of stroke falls to the same as a nonsmoker. After 10 years, the risk of lung cancer is cut in half and the risk of other cancers decreases significantly. After 15 years, the risk of coronary heart disease drops, usually to the level of a nonsmoker. If you are pregnant, quitting smoking will improve your chances of having a healthy baby. The people you live with, especially your children, will be healthier. You will have extra money to spend on things other than cigarettes. FIVE KEYS TO QUITTING  Studies have shown that these 5 steps will help you quit smoking and quit for good. You have the best chances of quitting if you use them together:  Get ready. Get support and encouragement. Learn new skills and behaviors. Get medicine to reduce your nicotine addiction and use it correctly. Be prepared for relapse or difficult situations. Be determined to continue trying to quit, even if you do not succeed at first.  1. GET READY  Set a quit date. Change your environment. Get rid of ALL cigarettes, ashtrays, matches, and lighters in your home, car, and place of work. Do not let people smoke in your home. Review your past attempts to quit. Think about what worked and what did not. Once you quit, do not smoke. NOT EVEN A PUFF! 2. GET SUPPORT AND ENCOURAGEMENT  Studies have shown that you have a better chance of being successful if you have help.  You can get support in many ways.  Tell your family, friends, and coworkers that you are going to quit and need their support. Ask them not to smoke around you. Talk to your caregivers (doctor, dentist, nurse, pharmacist, psychologist, and/or smoking counselor). Get individual, group, or telephone counseling and support. The more counseling you have, the better your chances are of quitting. Programs are available at [facility]. Call your local health department for information about programs in your area. Spiritual beliefs and practices may help some smokers quit. Quit meters are small computer programs online or downloadable that keep track of quit statistics, such as amount of \"quit-time,\" cigarettes not smoked, and money saved. Many smokers find one or more of the many self-help books available useful in helping them quit and stay off tobacco.  3. LEARN NEW SKILLS AND BEHAVIORS  Try to distract yourself from urges to smoke. Talk to someone, go for a walk, or occupy your time with a task. When you first try to quit, change your routine. Take a different route to work. Drink tea instead of coffee. Eat breakfast in a different place. Do something to reduce your stress. Take a hot bath, exercise, or read a book. Plan something enjoyable to do every day. Reward yourself for not smoking. Explore interactive web-based programs that specialize in helping you quit. 4. GET MEDICINE AND USE IT CORRECTLY  Medicines can help you stop smoking and decrease the urge to smoke. Combining medicine with the above behavioral methods and support can quadruple your chances of successfully quitting smoking. The [place] Food and Drug Administration (FDA) has approved 7 medicines to help you quit smoking. These medicines fall into 3 categories. Nicotine replacement therapy (delivers nicotine to your body without the negative effects and risks of smoking):  Nicotine gum: Available over-the-counter.   Nicotine lozenges: Available over-the-counter. Nicotine inhaler: Available by prescription. Nicotine nasal spray: Available by prescription. Nicotine skin patches (transdermal): Available by prescription and over-the-counter. Antidepressant medicine (helps people abstain from smoking, but how this works is unknown): Bupropion sustained-release (SR) tablets: Available by prescription. Nicotinic receptor partial agonist (simulates the effect of nicotine in your brain):  Varenicline tartrate tablets: Available by prescription. Ask your caregiver for advice about which medicines to use and how to use them. Carefully read the information on the package. Everyone who is trying to quit may benefit from using a medicine. If you are pregnant or trying to become pregnant, nursing an infant, you are under age 25, or you smoke fewer than 10 cigarettes per day, talk to your caregiver before taking any nicotine replacement medicines. You should stop using a nicotine replacement product and call your caregiver if you experience nausea, dizziness, weakness, vomiting, fast or irregular heartbeat, mouth problems with the lozenge or gum, or redness or swelling of the skin around the patch that does not go away. Do not use any other product containing nicotine while using a nicotine replacement product. Talk to your caregiver before using these products if you have diabetes, heart disease, asthma, stomach ulcers, you had a recent heart attack, you have high blood pressure that is not controlled with medicine, a history of irregular heartbeat, or you have been prescribed medicine to help you quit smoking. 5. BE PREPARED FOR RELAPSE OR DIFFICULT SITUATIONS  Most relapses occur within the first 3 months after quitting. Do not be discouraged if you start smoking again. Remember, most people try several times before they finally quit. You may have symptoms of withdrawal because your body is used to nicotine.  You may crave cigarettes, be irritable, feel very hungry, cough often, get headaches, or have difficulty concentrating. The withdrawal symptoms are only temporary. They are strongest when you first quit, but they will go away within 10 to 14 days. Here are some difficult situations to watch for:  Alcohol. Avoid drinking alcohol. Drinking lowers your chances of successfully quitting. Caffeine. Try to reduce the amount of caffeine you consume. It also lowers your chances of successfully quitting. Other smokers. Being around smoking can make you want to smoke. Avoid smokers. Weight gain. Many smokers will gain weight when they quit, usually less than 10 pounds. Eat a healthy diet and stay active. Do not let weight gain distract you from your main goal, quitting smoking. Some medicines that help you quit smoking may also help delay weight gain. You can always lose the weight gained after you quit. Bad mood or depression. There are a lot of ways to improve your mood other than smoking. If you are having problems with any of these situations, talk to your caregiver. SPECIAL SITUATIONS AND CONDITIONS  Studies suggest that everyone can quit smoking. Your situation or condition can give you a special reason to quit. Pregnant women/new mothers: By quitting, you protect your baby's health and your own. Hospitalized patients: By quitting, you reduce health problems and help healing. Heart attack patients: By quitting, you reduce your risk of a second heart attack. Lung, head, and neck cancer patients: By quitting, you reduce your chance of a second cancer. Parents of children and adolescents: By quitting, you protect your children from illnesses caused by secondhand smoke. QUESTIONS TO THINK ABOUT  Think about the following questions before you try to stop smoking. You may want to talk about your answers with your caregiver. Why do you want to quit? If you tried to quit in the past, what helped and what did not?   What will be the most difficult situations for you after you quit? How will you plan to handle them? Who can help you through the tough times? Your family? Friends? Caregiver? What pleasures do you get from smoking? What ways can you still get pleasure if you quit? Here are some questions to ask your caregiver: How can you help me to be successful at quitting? What medicine do you think would be best for me and how should I take it? What should I do if I need more help? What is smoking withdrawal like? How can I get information on withdrawal?  Quitting takes hard work and a lot of effort, but you can quit smoking. FOR MORE INFORMATION   Smokefree. gov ([employer]) provides free, accurate, evidence-based information and professional assistance to help support the immediate and long-term needs of people trying to quit smoking. Document Released: 12/12/2002 Document Revised: 12/06/2012 Document Reviewed: 10/04/2010  [employer]OR[employer]NCAR[employer] [facility] Patient Information ©2012 Nella Munoz. ·     Keeping Home a [facility]       As we get older, changes in balance, gait, strength, vision, hearing, and cognition make even the most youthful senior more prone to accidents. Falls are one of the leading health risks for older people. This increased risk of falling is related to:   Aging process (eg, decreased muscle strength, slowed reflexes)   Higher incidence of chronic health problems (eg, arthritis, diabetes) that may limit mobility, agility or sensory awareness   Side effects of medicine (eg, dizziness, blurred vision)especially medicines like prescription pain medicines and drugs used to treat mental health conditions   Depending on the brittleness of your bones, the consequences of a fall can be serious and long lasting. Home Life   Research by the Association of Aging [facility]) shows that some home accidents among older adults can be prevented by making simple lifestyle changes and basic modifications and repairs to the home environment.  Here are some lifestyle changes that experts recommend:   Have your hearing and vision checked regularly. Be sure to wear prescription glasses that are right for you. Speak to your doctor or pharmacist about the possible side effects of your medicines. A number of medicines can cause dizziness. If you have problems with sleep, talk to your doctor. Limit your intake of alcohol. If necessary, use a cane or walker to help maintain your balance. Wear supportive, rubber-soled shoes, even at home. If you live in a region that gets wintry weather, you may want to put special cleats on your shoes to prevent you from slipping on the snow and ice. Exercise regularly to help maintain muscle tone, agility, and balance. Always hold the banister when going up or down stairs. Also, use  bars when getting in or out of the bath or shower, or using the toilet. To avoid dizziness, get up slowly from a lying down position. Sit up first, dangling your legs for a minute or two before rising to a standing position. Overall Home Safety Check   According to the Consumer Product Safety Commision's \"Older Consumer Home Safety Checklist,\" it is important to check for potential hazards in each room. And remember, proper lighting is an essential factor in home safety. If you cannot see clearly, you are more likely to fall. Important questions to ask yourself include:   Are lamp, electric, extension, and telephone cords placed out of the flow of traffic and maintained in good condition? Have frayed cords been replaced? Are all small rugs and runners slip resistant? If not, you can secure them to the floor with a special double-sided carpet tape. Are smoke detectors properly locatedone on every floor of your home and one outside of every sleeping area? Are they in good working order? Are batteries replaced at least once a year? Do you have a well-maintained carbon monoxide detector outside every sleeping are in your home?    Does your furniture layout leave plenty of space to maneuver between and around chairs, tables, beds, and sofas? Are hallways, stairs and passages between rooms well lit? Can you reach a lamp without getting out of bed? Are floor surfaces well maintained? Shag rugs, high-pile carpeting, tile floors, and polished wood floors can be particularly slippery. Stairs should always have handrails and be carpeted or fitted with a non-skid tread. Is your telephone easily reachable. Is the cord safely tucked away? Room by Room   According to the Association of Aging, bathrooms and alma delia are the two most potentially hazardous rooms in your home. In the Kitchen    Be sure your stove is in proper working order and always make sure burners and the oven are off before you go out or go to sleep. Keep pots on the back burners, turn handles away from the front of the stove, and keep stove clean and free of grease build-up. Kitchen ventilation systems and range exhausts should be working properly. Keep flammable objects such as towels and pot holders away from the cooking area except when in use. Make sure kitchen curtains are tied back. Move cords and appliances away from the sink and hot surfaces. If extension cords are needed, install wiring guides so they do not hang over the sink, range, or working areas. Look for coffee pots, kettles and toaster ovens with automatic shut-offs. Keep a mop handy in the kitchen so you can wipe up spills instantly. You should also have a small fire extinguisher. Arrange your kitchen with frequently used items on lower shelves to avoid the need to stand on a stepstool to reach them. Make sure countertops are well-lit to avoid injuries while cutting and preparing food. In the Bathroom    Use a non-slip mat or decals in the tub and shower, since wet, soapy tile or porcelain surfaces are extremely slippery.     Make sure bathroom rugs are non-skid or tape them firmly to the floor. Bathtubs should have at least one, preferably two, grab bars, firmly attached to structural supports in the wall. (Do not use built-in soap holders or glass shower doors as grab bars.)    Tub seats fitted with non-slip material on the legs allow you to wash sitting down. For people with limited mobility, bathtub transfer benches allow you to slide safely into the tub. Raised toilet seats and toilet safety rails are helpful for those with knee or hip problems. In the [facility]    Make sure you use a nightlight and that the area around your bed is clear of potential obstacles. Be careful with electric blankets and never go to sleep with a heating pad, which can cause serious burns even if on a low setting. Use fire-resistant mattress covers and pillows, and NEVER smoke in bed. Keep a phone next to the bed that is programmed to dial 911 at the push of a button. If you have a chronic condition, you may want to sign on with an automatic call-in service. Typically the system includes a small pendant that connects directly to an emergency medical voice-response system. You should also make arrangements to stay in contact with someonefriend, neighbor, family memberon a regular schedule. Fire Prevention   According to the [employer]. (Smoke Alarms for Every) [address] [place] [address], senior citizens are one of the two highest risk groups for death and serious injuries due to residential fires. When cooking, wear short-sleeved items, never a bulky long-sleeved robe. The [facility]'s Safety Checklist for Older Consumers emphasizes the importance of checking basements, garages, workshops and storage areas for fire hazards, such as volatile liquids, piles of old rags or clothing and overloaded circuits. Never smoke in bed or when lying down on a couch or recliner chair. Small portable electric or kerosene heaters are responsible for many home fires and should be used cautiously if at all.  If you do use one, be sure to keep them away from flammable materials. In case of fire, make sure you have a pre-established emergency exit plan. Have a professional check your fireplace and other fuel-burning appliances yearly. [employer]   Baby boomers entering the gao years will continue to see the development of new products to help older adults live safely and independently in spite of age-related changes. Making Life More Livable  , by Angelic Healy, lists over 1,000 products for \"living well in the mature years,\" such as bathing and mobility aids, household security devices, ergonomically designed knives and peelers, and faucet valves and knobs for temperature control. Medical supply stores and organizations are good sources of information about products that improve your quality of life and insure your safety. Last Reviewed: November 2009 Margarita Alberts MD   Updated: 3/7/2011     ·        Advance Care Planning: Care Instructions  Your Care Instructions     It can be hard to live with an illness that cannot be cured. But if your health is getting worse, you may want to make decisions about end-of-life care. Planning for the end of your life does not mean that you are giving up. It is a way to make sure that your wishes are met. Clearly stating your wishes can make it easier for your loved ones. Making plans while you are still able may also ease your mind and make your final days less stressful and more meaningful. Follow-up care is a key part of your treatment and safety. Be sure to make and go to all appointments, and call your doctor if you are having problems. It's also a good idea to know your test results and keep a list of the medicines you take. What can you do to plan for the end of life? You can bring these issues up with your doctor. You do not need to wait until your doctor starts the conversation. You might start with, \"What makes life worth living for me is. [place] \" And then follow it with, \"I would not be willing to live with . Thresa Abed Thresa Abed Thresa Abed \" When you complete this sentence it helps your doctor understand your wishes. Talk openly and honestly with your doctor. This is the best way to understand the decisions you will need to make as your health changes. Know that you can always change your mind. Ask your doctor about commonly used life-support measures. These include tube feedings, breathing machines, and fluids given through a vein (IV). Understanding these treatments will help you decide whether you want them. You may choose to have these life-supporting treatments for a limited time. This allows a trial period to see whether they will help you. You may also decide that you want your doctor to take only certain measures to keep you alive. It may help to think about the big picture, like what makes life worth living for you or what your values and goals are. Talk to your doctor about how long you are likely to live. Your doctor may be able to give you an idea of what usually happens with your specific illness. Think about preparing papers that state your wishes. These papers are called advance directives. If you do this early and review them often, there will not be any confusion about what you want. You can change your instructions at any time. Which papers should you prepare? Advance directives are legal papers that tell doctors how you want to be cared for at the end of your life. You do not need a  to write these papers. Ask your doctor or your state health department for information on how to write your advance directives. They may have the forms for each of these types of papers. Make sure your doctor has a copy of these on file, and give a copy to a family member or close friend. Consider a do-not-resuscitate order (DNR). This order asks that no extra treatments be done if your heart stops or you stop breathing.  Extra treatments may include cardiopulmonary resuscitation (CPR), electrical shock to restart your heart, or a machine to breathe for you. If you decide to have a DNR order, ask your doctor to explain and write it. Place the order in your home where everyone can easily see it. Consider a living will. A living will explains your wishes about life support and other treatments at the end of your life if you become unable to speak for yourself. Living michaels tell doctors to use or not use treatments that would keep you alive. You must have one or two witnesses or a notary present when you sign this form. A living will may be called something else in your state. Consider a medical power of . This form allows you to name a person to make decisions about your care if you are not able to. Most people ask a close friend or family member. Talk to this person about the kinds of treatments you want and those that you do not want. Make sure this person understands your wishes. A medical power of  may be called something else in your state. These legal papers are simple to change. Tell your doctor what you want to change, and ask him or her to make a note in your medical file. Give your family updated copies of the papers. Where can you learn more? Go to https://[employer].Wedding [employer]. org and sign in to your Forgame account. Enter P184 in the Located [facility] box to learn more about \"Advance Care Planning: Care Instructions. \"     If you do not have an account, please click on the \"Sign Up Now\" link. Current as of: March 17, 2021               Content Version: 13.1  © 2006-2021 Healthwise, Incorporated. Care instructions adapted under license by [employer]. If you have questions about a medical condition or this instruction, always ask your healthcare professional. [place] [address] any warranty or liability for your use of this information. ·        Learning About Living Perroy  What is a living will?      A living will, also called a declaration, is a legal form. It tells your family and your doctor your wishes when you can't speak for yourself. It's used by the health professionals who will treat you as you near the end of your life or if you get seriously hurt or ill. If you put your wishes in writing, your loved ones and others will know what kind of care you want. They won't need to guess. This can ease your mind and be helpful to others. And you can change or cancel your living will at any time. A living will is not the same as an estate or property will. An estate will explains what you want to happen with your money and property after you die. How do you use it? A living will is used to describe the kinds of treatment or life support you want as you near the end of your life or if you get seriously hurt or ill. Keep these facts in mind about living michaels. Your living will is used only if you can't speak or make decisions for yourself. Most often, one or more doctors must certify that you can't speak or decide for yourself before your living will takes effect. If you get better and can speak for yourself again, you can accept or refuse any treatment. It doesn't matter what you said in your living will. Some states may limit your right to refuse treatment in certain cases. For example, you may need to clearly state in your living will that you don't want artificial hydration and nutrition, such as being fed through a tube. Is a living will a legal document? A living will is a legal document. Each state has its own laws about living michaels. And a living will may be called something else in your state. Here are some things to know about living michaels. You don't need an  to complete a living will. But legal advice can be helpful if your state's laws are unclear. It can also help if your health history is complicated or your family can't agree on what should be in your living will. You can change your living will at any time. Some people find that their wishes about end-of-life care change as their health changes. If you make big changes to your living will, complete a new form. If you move to another state, make sure that your living will is legal in the state where you now live. In most cases, doctors will respect your wishes even if you have a form from a different state. You might use a universal form that has been approved by many states. This kind of form can sometimes be filled out and stored online. Your digital copy will then be available wherever you have a connection to the internet. The doctors and nurses who need to treat you can find it right away. Your state may offer an online registry. This is another place where you can store your living will online. It's a good idea to get your living will notarized. This means using a person called a [employer] to watch two people sign, or witness, your living will. What should you know when you create a living will? Here are some questions to ask yourself as you make your living will:  Do you know enough about life support methods that might be used? If not, talk to your doctor so you know what might be done if you can't breathe on your own, your heart stops, or you can't swallow. What things would you still want to be able to do after you receive life-support methods? Would you want to be able to walk? To speak? To eat on your own? To live without the help of machines? Do you want certain Hindu practices performed if you become very ill? If you have a choice, where do you want to be cared for? In your home? At a hospital or nursing home? If you have a choice at the end of your life, where would you prefer to die? At home? In a hospital or nursing home? Somewhere else? Would you prefer to be buried or cremated? Do you want your organs to be donated after you die? What should you do with your living will?   Make sure that your family members and your health care agent have copies of your living will (also called a declaration). Give your doctor a copy of your living will. Ask him or her to keep it as part of your medical record. If you have more than one doctor, make sure that each one has a copy. Put a copy of your living will where it can be easily found. For example, some people may put a copy on their refrigerator door. If you are using a digital copy, be sure your doctor, family members, and health care agent know how to find and access it. Where can you learn more? Go to https://chpepiceweb.[email or web address].com. org and sign in to your uStudio account. Enter C724 in the [employer] box to learn more about \"Learning About Living Perroy. \"     If you do not have an account, please click on the \"Sign Up Now\" link. Current as of: March 17, 2021               Content Version: 13.1  © 1906-9831 [employer]. Care instructions adapted under license by [facility] ([facility]). If you have questions about a medical condition or this instruction, always ask your healthcare professional. [place] [address] any warranty or liability for your use of this information. ·        Learning About Medical Power of   What is a medical power of ? A medical power of , also called a durable power of  for health care, is one type of the legal forms called advance directives. It lets you name the person you want to make treatment decisions for you if you can't speak or decide for yourself. The person you choose is called your health care agent. This person is also called a health care proxy or health care surrogate. A medical power of  may be called something else in your state. How do you choose a health care agent? Choose your health care agent carefully. This person may or may not be a family member. Talk to the person before you make your final decision.  Make sure he or she is comfortable with this responsibility. It's a good idea to choose someone who:  Is at least 25years old. Knows you well and understands what makes life meaningful for you. Understands your Jainism and moral values. Will do what you want, not what he or she wants. Will be able to make difficult choices at a stressful time. Will be able to refuse or stop treatment, if that is what you would want, even if you could die. Will be firm and confident with health professionals if needed. Will ask questions to get needed information. Lives near you or agrees to travel to you if needed. Your family may help you make medical decisions while you can still be part of that process. But it's important to choose one person to be your health care agent in case you aren't able to make decisions for yourself. If you don't fill out the legal form and name a health care agent, the decisions your family can make may be limited. A health care agent may be called something else in your state. Who will make decisions for you if you don't have a health care agent? If you don't have a health care agent or a living will, you may not get the care you want. Decisions may be made by family members who disagree about your medical care. Or decisions may be made by a medical professional who doesn't know you well. In some cases, a  makes the decisions. When you name a health care agent, it is very clear who has the power to make health decisions for you. How do you name a health care agent? You name your health care agent on a legal form. This form is usually called a medical power of . Ask your hospital, state bar association, or office on aging where to find these forms. You must sign the form to make it legal. Some states require you to get the form notarized. This means that a person called a  watches you sign the form and then he or she signs the form.  Some states also require that two or more witnesses sign the form.  Be sure to tell your family members and doctors who your health care agent is. Where can you learn more? Go to https://chpepiceweb.[email or web address] org and sign in to your Muzicall account. Enter [id number] in the [facility] box to learn more about \"Learning About [address]. \"     If you do not have an account, please click on the \"Sign Up Now\" link. Current as of: March 17, 2021               Content Version: 13.1  © 2744-1637 Healthwise, [employer]utor. Care instructions adapted under license by [facility] [address] If you have questions about a medical condition or this instruction, always ask your healthcare professional. [address] any warranty or liability for your use of this information.     ·

## 2022-03-09 DIAGNOSIS — Z79.4 TYPE 2 DIABETES MELLITUS WITH HYPERGLYCEMIA, WITH LONG-TERM CURRENT USE OF INSULIN (HCC): ICD-10-CM

## 2022-03-09 DIAGNOSIS — E11.65 TYPE 2 DIABETES MELLITUS WITH HYPERGLYCEMIA, WITH LONG-TERM CURRENT USE OF INSULIN (HCC): ICD-10-CM

## 2022-03-09 RX ORDER — DULAGLUTIDE 0.75 MG/.5ML
INJECTION, SOLUTION SUBCUTANEOUS
Qty: 6 ML | Refills: 1 | Status: SHIPPED | OUTPATIENT
Start: 2022-03-09

## 2022-03-09 NOTE — TELEPHONE ENCOUNTER
Last OV 1/31/2022  Next OV 4/14/2022      Requested Prescriptions     Pending Prescriptions Disp Refills    TRULICITY 5.71 YB/7.1VY SOPN [Pharmacy Med Name: TRULICITY 7.59JG/8.3MV SDP 0.5ML] 6 mL      Sig: ADMINISTER 0.75 MG UNDER THE SKIN 1 TIME A WEEK

## 2022-03-12 DIAGNOSIS — I10 ESSENTIAL HYPERTENSION: ICD-10-CM

## 2022-03-12 DIAGNOSIS — E78.2 MIXED HYPERLIPIDEMIA: ICD-10-CM

## 2022-03-12 DIAGNOSIS — F33.41 RECURRENT MAJOR DEPRESSIVE DISORDER, IN PARTIAL REMISSION (HCC): ICD-10-CM

## 2022-03-12 DIAGNOSIS — F41.9 ANXIETY: ICD-10-CM

## 2022-03-12 DIAGNOSIS — K21.9 GASTROESOPHAGEAL REFLUX DISEASE WITHOUT ESOPHAGITIS: ICD-10-CM

## 2022-03-12 DIAGNOSIS — E03.9 ACQUIRED HYPOTHYROIDISM: ICD-10-CM

## 2022-03-14 RX ORDER — ATORVASTATIN CALCIUM 40 MG/1
TABLET, FILM COATED ORAL
Qty: 90 TABLET | Refills: 0 | Status: SHIPPED | OUTPATIENT
Start: 2022-03-14

## 2022-03-14 RX ORDER — LEVOTHYROXINE SODIUM 0.03 MG/1
TABLET ORAL
Qty: 90 TABLET | Refills: 0 | Status: SHIPPED | OUTPATIENT
Start: 2022-03-14

## 2022-03-14 RX ORDER — LISINOPRIL 40 MG/1
TABLET ORAL
Qty: 90 TABLET | Refills: 0 | Status: SHIPPED | OUTPATIENT
Start: 2022-03-14

## 2022-03-14 RX ORDER — ESCITALOPRAM OXALATE 20 MG/1
TABLET ORAL
Qty: 90 TABLET | Refills: 0 | Status: SHIPPED | OUTPATIENT
Start: 2022-03-14

## 2022-03-14 RX ORDER — ESOMEPRAZOLE MAGNESIUM 40 MG/1
CAPSULE, DELAYED RELEASE ORAL
Qty: 90 CAPSULE | Refills: 0 | Status: SHIPPED | OUTPATIENT
Start: 2022-03-14

## 2022-03-14 NOTE — TELEPHONE ENCOUNTER
Last OV 1/31/2022  Next OV 4/14/2022      Requested Prescriptions     Pending Prescriptions Disp Refills    levothyroxine (SYNTHROID) 25 MCG tablet [Pharmacy Med Name: LEVOTHYROXINE 0.025MG (25MCG) TAB] 90 tablet 1     Sig: TAKE 1 TABLET BY MOUTH EVERY DAY    esomeprazole (NEXIUM) 40 MG delayed release capsule [Pharmacy Med Name: ESOMEPRAZOLE MAGNESIUM 40MG DR CAPS] 90 capsule 1     Sig: TAKE 1 CAPSULE BY MOUTH EVERY MORNING BEFORE BREAKFAST    lisinopril (PRINIVIL;ZESTRIL) 40 MG tablet [Pharmacy Med Name: LISINOPRIL 40MG TABLETS] 90 tablet 1     Sig: TAKE 1 TABLET BY MOUTH EVERY DAY    atorvastatin (LIPITOR) 40 MG tablet [Pharmacy Med Name: ATORVASTATIN 40MG TABLETS] 90 tablet 1     Sig: TAKE 1 TABLET BY MOUTH EVERY DAY    escitalopram (LEXAPRO) 20 MG tablet [Pharmacy Med Name: ESCITALOPRAM 20MG TABLETS] 90 tablet 1     Sig: TAKE 1 TABLET BY MOUTH EVERY DAY

## 2022-04-27 ENCOUNTER — OFFICE VISIT (OUTPATIENT)
Dept: FAMILY MEDICINE CLINIC | Age: 67
End: 2022-04-27
Payer: MEDICARE

## 2022-04-27 VITALS
DIASTOLIC BLOOD PRESSURE: 60 MMHG | TEMPERATURE: 96.6 F | BODY MASS INDEX: 25.79 KG/M2 | SYSTOLIC BLOOD PRESSURE: 112 MMHG | WEIGHT: 141 LBS

## 2022-04-27 DIAGNOSIS — Z79.4 TYPE 2 DIABETES MELLITUS WITH HYPERGLYCEMIA, WITH LONG-TERM CURRENT USE OF INSULIN (HCC): ICD-10-CM

## 2022-04-27 DIAGNOSIS — R53.1 LEFT-SIDED WEAKNESS: ICD-10-CM

## 2022-04-27 DIAGNOSIS — E78.5 HYPERLIPIDEMIA, UNSPECIFIED HYPERLIPIDEMIA TYPE: ICD-10-CM

## 2022-04-27 DIAGNOSIS — R30.0 DYSURIA: ICD-10-CM

## 2022-04-27 DIAGNOSIS — I10 PRIMARY HYPERTENSION: ICD-10-CM

## 2022-04-27 DIAGNOSIS — I63.9 CEREBROVASCULAR ACCIDENT (CVA), UNSPECIFIED MECHANISM (HCC): Primary | ICD-10-CM

## 2022-04-27 DIAGNOSIS — E03.9 ACQUIRED HYPOTHYROIDISM: ICD-10-CM

## 2022-04-27 DIAGNOSIS — L89.90 PRESSURE INJURY OF SKIN, UNSPECIFIED INJURY STAGE, UNSPECIFIED LOCATION: ICD-10-CM

## 2022-04-27 DIAGNOSIS — E11.65 TYPE 2 DIABETES MELLITUS WITH HYPERGLYCEMIA, WITH LONG-TERM CURRENT USE OF INSULIN (HCC): ICD-10-CM

## 2022-04-27 DIAGNOSIS — K21.9 GASTROESOPHAGEAL REFLUX DISEASE, UNSPECIFIED WHETHER ESOPHAGITIS PRESENT: ICD-10-CM

## 2022-04-27 LAB
BILIRUBIN URINE: NEGATIVE
BLOOD, URINE: ABNORMAL
CLARITY: ABNORMAL
COLOR: YELLOW
COMMENT UA: ABNORMAL
GLUCOSE URINE: NEGATIVE MG/DL
KETONES, URINE: NEGATIVE MG/DL
LEUKOCYTE ESTERASE, URINE: ABNORMAL
NITRITE, URINE: NEGATIVE
PH UA: 6.5 (ref 5–8)
PROTEIN UA: 100 MG/DL
SPECIFIC GRAVITY UA: 1.01 (ref 1–1.03)
UROBILINOGEN, URINE: 0.2 E.U./DL
WBC UA: ABNORMAL /HPF (ref 0–5)

## 2022-04-27 PROCEDURE — 99214 OFFICE O/P EST MOD 30 MIN: CPT | Performed by: FAMILY MEDICINE

## 2022-04-27 PROCEDURE — 81003 URINALYSIS AUTO W/O SCOPE: CPT | Performed by: FAMILY MEDICINE

## 2022-04-27 PROCEDURE — 3051F HG A1C>EQUAL 7.0%<8.0%: CPT | Performed by: FAMILY MEDICINE

## 2022-04-27 RX ORDER — BACITRACIN 500 [USP'U]/G
OINTMENT TOPICAL PRN
COMMUNITY

## 2022-04-27 RX ORDER — MIRTAZAPINE 15 MG/1
15 TABLET, FILM COATED ORAL NIGHTLY
COMMUNITY

## 2022-04-27 RX ORDER — ALPRAZOLAM 0.5 MG/1
0.5 TABLET ORAL NIGHTLY PRN
COMMUNITY

## 2022-04-27 RX ORDER — HYDROCHLOROTHIAZIDE 25 MG/1
25 TABLET ORAL DAILY
COMMUNITY

## 2022-04-27 RX ORDER — OMEPRAZOLE 20 MG/1
20 CAPSULE, DELAYED RELEASE ORAL DAILY
COMMUNITY
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
## 2022-04-29 LAB
ORGANISM: ABNORMAL
URINE CULTURE, ROUTINE: ABNORMAL
URINE CULTURE, ROUTINE: ABNORMAL

## 2022-05-10 ENCOUNTER — TELEPHONE (OUTPATIENT)
Dept: FAMILY MEDICINE CLINIC | Age: 67
End: 2022-05-10

## 2022-05-21 ASSESSMENT — ENCOUNTER SYMPTOMS
ABDOMINAL PAIN: 0
COUGH: 0
DIARRHEA: 0
NAUSEA: 0
VOMITING: 0
EYE PAIN: 0
SHORTNESS OF BREATH: 0
CONSTIPATION: 0
RHINORRHEA: 0
EYE DISCHARGE: 0
BACK PAIN: 1

## 2022-05-21 NOTE — PATIENT INSTRUCTIONS
Patient Education        Learning About an Ischemic Stroke  What is an ischemic stroke? An ischemic (say \"Bandar\") stroke occurs when a blood clot blocks a blood vessel in the brain. This means that blood cannot flow to some part of the brain. Without blood and the oxygen it carries, this part of the brain startsto die. This is different from a hemorrhagic (say \"gcu-ewq-RH-raymondck\") stroke, whichhappens when a blood vessel in the brain has burst open or has started to leak. The brain damage from a stroke starts within minutes. The part of the body controlled by the damaged area of the brain cannot work properly. David Marker can help limit damage to the brain and make recovery more likely. The problems someone has after a stroke depend on what part of the brain was affected and how much damage the stroke caused. A stroke may affect movement and senses, speech, memory, thinking, or emotions. Stroke rehabilitation, whichincludes training and therapy, can help people recover. What are the symptoms? If you have any of these symptoms, call 911 or other emergency services right away:    You have symptoms of a stroke. These may include:  ? Sudden numbness, tingling, weakness, or loss of movement in your face, arm, or leg, especially on only one side of your body. ? Sudden vision changes. ? Sudden trouble speaking. ? Sudden confusion or trouble understanding simple statements. ? Sudden problems with walking or balance. ? A sudden, severe headache that is different from past headaches. ? Fainting. ? A seizure. Call 911 if you have symptoms that seem like a stroke, even if they go away quickly. You may have had a transient ischemic attack (TIA). A TIA is a warning that a stroke may happen soon. Getting early treatment for a TIA can help prevent astroke. What causes an ischemic stroke? An ischemic stroke is caused by a blood clot that blocks blood flow in thebrain.  Causes of blood clots include:   Hardening of the arteries (atherosclerosis). This is caused by high blood pressure, diabetes, high cholesterol, or smoking.  Atrial fibrillation. How is ischemic stroke treated? Emergency treatment may be done to restore blood flow to the brain using medicine or a procedure. Medicine and a heart-healthy lifestyle can help prevent another stroke. A stroke rehab program can help you recover and learnways to adapt to changes caused by a stroke. How can you help prevent another stroke?  Work with your doctor to treat any health problems that raise your risk. These include atrial fibrillation, diabetes, high blood pressure, and high cholesterol.  Be safe with medicines. Take your medicine exactly as prescribed. Call your doctor if you think you are having a problem with your medicine.  Have a healthy lifestyle. ? Do not smoke or allow others to smoke around you. If you need help quitting, talk to your doctor about stop-smoking programs and medicines. These can increase your chances of quitting for good. ? Limit alcohol to 2 drinks a day for men and 1 drink a day for women. ? Stay at a healthy weight. Lose weight if you need to.  ? Be active. Ask your doctor what type and level of activity is safe for you.  ? Eat heart-healthy foods. These include vegetables, fruits, nuts, beans, lean meat, fish, and whole grains. Limit sodium and sugar.  If you think you may have a problem with alcohol or drug use, talk to your doctor. Follow-up care is a key part of your treatment and safety. Be sure to make and go to all appointments, and call your doctor if you are having problems. It's also a good idea to know your test results and keep alist of the medicines you take. Where can you learn more? Go to https://alexa.[email or web address]. org and sign in to your [employer] account. Enter D161 in the [facility] box to learn more about \"Learning About an Ischemic Stroke. \"     If you do not have

## 2022-08-13 NOTE — PROGRESS NOTES
[facility] Medicine Services  INPATIENT PROGRESS NOTE    Length of Stay: 1  Date of Admission: 2/19/2021  Primary Care Physician: Ramana Lynn MD    Subjective     Chief Complaint:     Pain and swelling right neck and jawline    HPI     The patient continues to complain of discomfort this morning.  Discomfort seems to be somewhat improved.  She is currently on IV clindamycin to cover for staph species.  She initially went to [facility] emergency department for evaluation as her primary care physician could not see her until next week.  She was subsequently transferred to our facility where she was admitted for further treatment and evaluation.  ENT has seen and evaluated the patient and feels as though the patient's parotid masses are consistent with [place]'s tumors.  It is felt that the swelling on the right is more likely fluid collection from that recurrent tumor.  There is no concern for malignancy.  It is doubtful that there is an infectious component as the patient's white blood cell count is within normal limits and she has been afebrile.  ENT recommends treatment with IV antibiotics overnight with discharge tomorrow and possible outpatient ultrasound-guided needle biopsy to attempt to aspirate any accumulated fluids.  ENT notes that revision surgery may be required in the future but would be [facility] given that this would be a third procedure on her parotid glands.    CBC this a.m. is unremarkable except hemoglobin 11.2.  BMP is unremarkable except sodium 131 and glucose 183.    Review of Systems     All pertinent negatives and positives are as above. All other systems have been reviewed and are negative unless otherwise stated.     Objective    Temp:  [97.6 °F (36.4 °C)-98.3 °F (36.8 °C)] 98.3 °F (36.8 °C)  Heart Rate:  [78-89] 88  Resp:  [14-18] 14  BP: (138-161)/(61-70) 149/66    Lab Results (last 24 hours)     Procedure Component Value Units  Date/Time    POC Glucose Once [[id number]]  (Abnormal) Collected: 02/20/21 1114    Specimen: Blood Updated: 02/20/21 1126     Glucose 188 mg/dL      Comment: : [id number] Omar RobertsnnaMeter ID: [id number]       POC Glucose Once [[id number]]  (Abnormal) Collected: 02/20/21 0756    Specimen: Blood Updated: 02/20/21 0807     Glucose 187 mg/dL      Comment: : [id number] Jose Murray ID: [id number]       Basic Metabolic Panel [[id number]]  (Abnormal) Collected: 02/20/21 0400    Specimen: Blood Updated: 02/20/21 0457     Glucose 183 mg/dL      BUN 15 mg/dL      Creatinine 0.82 mg/dL      Sodium 131 mmol/L      Potassium 3.8 mmol/L      Chloride 94 mmol/L      CO2 29.0 mmol/L      Calcium 9.2 mg/dL      eGFR Non African Amer 70 mL/min/1.73      BUN/Creatinine Ratio 18.3     Anion Gap 8.0 mmol/L     Narrative:      GFR Normal >60  Chronic Kidney Disease <60  Kidney Failure <15      CBC & Differential [[id number]]  (Abnormal) Collected: 02/20/21 0400    Specimen: Blood Updated: 02/20/21 0450    Narrative:      The following orders were created for panel order CBC & Differential.  Procedure                               Abnormality         Status                     ---------                               -----------         ------                     CBC Auto Differential[[id number]]        Abnormal            Final result                 Please view results for these tests on the individual orders.    CBC Auto Differential [[id number]]  (Abnormal) Collected: 02/20/21 0400    Specimen: Blood Updated: 02/20/21 0450     WBC 7.78 10*3/mm3      RBC 3.75 10*6/mm3      Hemoglobin 11.2 g/dL      Hematocrit 31.8 %      MCV 84.8 fL      MCH 29.9 pg      MCHC 35.2 g/dL      RDW 12.6 %      RDW-SD 38.5 fl      MPV 11.9 fL      Platelets 171 10*3/mm3      Neutrophil % 60.4 %      Lymphocyte % 28.1 %      Monocyte % 9.6 %      Eosinophil % 1.0 %      Basophil % 0.4 %      Immature Grans % 0.5 %      Neutrophils, Absolute 4.69  10*3/mm3      Lymphocytes, Absolute 2.19 10*3/mm3      Monocytes, Absolute 0.75 10*3/mm3      Eosinophils, Absolute 0.08 10*3/mm3      Basophils, Absolute 0.03 10*3/mm3      Immature Grans, Absolute 0.04 10*3/mm3      nRBC 0.0 /100 WBC     Basic Metabolic Panel [[id number]]  (Abnormal) Collected: 02/19/21 2306    Specimen: Blood Updated: 02/20/21 0022     Glucose 343 mg/dL      BUN 15 mg/dL      Creatinine 0.79 mg/dL      Sodium 128 mmol/L      Potassium 4.4 mmol/L      Chloride 91 mmol/L      CO2 29.0 mmol/L      Calcium 9.4 mg/dL      eGFR Non African Amer 74 mL/min/1.73      BUN/Creatinine Ratio 19.0     Anion Gap 8.0 mmol/L     Narrative:      GFR Normal >60  Chronic Kidney Disease <60  Kidney Failure <15      CBC & Differential [[id number]]  (Abnormal) Collected: 02/19/21 2306    Specimen: Blood Updated: 02/20/21 0005    Narrative:      The following orders were created for panel order CBC & Differential.  Procedure                               Abnormality         Status                     ---------                               -----------         ------                     CBC Auto Differential[[id number]]        Abnormal            Final result                 Please view results for these tests on the individual orders.    CBC Auto Differential [[id number]]  (Abnormal) Collected: 02/19/21 2306    Specimen: Blood Updated: 02/20/21 0005     WBC 8.84 10*3/mm3      RBC 3.93 10*6/mm3      Hemoglobin 11.9 g/dL      Hematocrit 33.1 %      MCV 84.2 fL      MCH 30.3 pg      MCHC 36.0 g/dL      RDW 12.5 %      RDW-SD 38.4 fl      MPV 12.3 fL      Platelets 191 10*3/mm3      Neutrophil % 62.3 %      Lymphocyte % 26.9 %      Monocyte % 9.4 %      Eosinophil % 0.8 %      Basophil % 0.3 %      Immature Grans % 0.3 %      Neutrophils, Absolute 5.50 10*3/mm3      Lymphocytes, Absolute 2.38 10*3/mm3      Monocytes, Absolute 0.83 10*3/mm3      Eosinophils, Absolute 0.07 10*3/mm3      Basophils, Absolute 0.03 10*3/mm3       Immature Grans, Absolute 0.03 10*3/mm3      nRBC 0.0 /100 WBC     POC Glucose Once [[id number]]  (Abnormal) Collected: 02/19/21 2257    Specimen: Blood Updated: 02/19/21 2333     Glucose 332 mg/dL      Comment: : [id number] Alec Herrera ID: [id number]             Imaging Results (Last 24 Hours)     ** No results found for the last 24 hours. **             Intake/Output Summary (Last 24 hours) at 2/20/2021 1349  Last data filed at 2/20/2021 1147  Gross per 24 hour   Intake --   Output 0 ml   Net 0 ml       Physical Exam  Constitutional:       Appearance: Normal appearance.   HENT:      Head: Normocephalic and atraumatic.      Mouth/Throat:      Mouth: Mucous membranes are moist.      Pharynx: Oropharynx is clear.   Eyes:      Extraocular Movements: Extraocular movements intact.      Pupils: Pupils are equal, round, and reactive to light.   Neck:      Musculoskeletal: Neck supple.      Comments: Range of motion difficult due to pain.  Parotid glands enlarged bilaterally.  Right parotid gland is tender to touch.  Cardiovascular:      Rate and Rhythm: Normal rate and regular rhythm.      Heart sounds: Normal heart sounds. No murmur.   Pulmonary:      Effort: Pulmonary effort is normal.      Breath sounds: Normal breath sounds.   Abdominal:      General: Bowel sounds are normal.      Palpations: Abdomen is soft.   Musculoskeletal: Normal range of motion.         General: No swelling.   Lymphadenopathy:   Cervical: No cervical adenopathy.   Skin:     General: Skin is warm and dry.   Neurological:      General: No focal deficit present.      Mental Status: She is alert and oriented to person, place, and time.   Psychiatric:         Mood and Affect: Mood normal.         Behavior: Behavior normal.    Results Review:  I have reviewed the labs, radiology results, and diagnostic studies since my last progress note and made treatment changes reflective of the results.   I have reviewed the current  medications.    Assessment/Plan     Active Hospital Problems    Diagnosis   • **Acute parotitis   • Mass of both parotid glands   • Hyponatremia   • Acute pain   • Type 2 diabetes mellitus with hyperglycemia, with long-term current use of insulin (CMS/[facility])   • Mixed diabetic hyperlipidemia associated with type 2 diabetes mellitus (CMS/[facility])   • Hypertension associated with diabetes (CMS/[facility])   • Diabetic polyneuropathy associated with type 2 diabetes mellitus (CMS/[facility])   • Uncontrolled type 2 diabetes mellitus, without long-term current use of insulin (CMS/[facility])       PLAN:  Continue clindamycin IV  Possible discharge tomorrow on oral clindamycin to follow-up with ENT on an outpatient basis    Electronically signed by Abhinav Rojas DO, 02/20/21, 13:49 CST.     13.5   4.25  )-----------( 185      ( 13 Aug 2022 14:20 )             40.2       08-13    139  |  105  |  10  ----------------------------<  82  See Note   |  26  |  0.78    Ca    9.3      13 Aug 2022 14:20    TPro  7.9  /  Alb  4.5  /  TBili  0.9  /  DBili  x   /  AST  See Note  /  ALT  See Note  /  AlkPhos  See Note  08-13      PT/INR - ( 13 Aug 2022 14:20 )   PT: 12.7 sec;   INR: 1.07          PTT - ( 13 Aug 2022 14:20 )  PTT:27.7 sec    CARDIAC MARKERS ( 13 Aug 2022 14:20 )  x     / 0.01 ng/mL / x     / x     / x                EKG:

## 2022-12-01 PROCEDURE — 84100 ASSAY OF PHOSPHORUS: CPT | Performed by: GENERAL PRACTICE

## 2022-12-01 PROCEDURE — 85025 COMPLETE CBC W/AUTO DIFF WBC: CPT | Performed by: GENERAL PRACTICE

## 2022-12-01 PROCEDURE — 80053 COMPREHEN METABOLIC PANEL: CPT | Performed by: GENERAL PRACTICE

## 2022-12-01 PROCEDURE — 83970 ASSAY OF PARATHORMONE: CPT | Performed by: GENERAL PRACTICE

## 2022-12-01 PROCEDURE — 84550 ASSAY OF BLOOD/URIC ACID: CPT | Performed by: GENERAL PRACTICE

## 2022-12-01 PROCEDURE — 82306 VITAMIN D 25 HYDROXY: CPT | Performed by: GENERAL PRACTICE

## 2022-12-01 PROCEDURE — 83735 ASSAY OF MAGNESIUM: CPT | Performed by: GENERAL PRACTICE

## 2022-12-02 ENCOUNTER — LAB REQUISITION (OUTPATIENT)
Dept: LAB | Facility: HOSPITAL | Age: 67
End: 2022-12-02

## 2022-12-02 DIAGNOSIS — Z00.00 ROUTINE GENERAL MEDICAL EXAMINATION AT A HEALTH CARE FACILITY: ICD-10-CM

## 2022-12-02 DIAGNOSIS — R53.1 WEAKNESS: ICD-10-CM

## 2022-12-02 LAB
25(OH)D3 SERPL-MCNC: 24.6 NG/ML (ref 30–100)
ALBUMIN SERPL-MCNC: 3.7 G/DL (ref 3.5–5.2)
ALBUMIN/GLOB SERPL: 2.1 G/DL
ALP SERPL-CCNC: 113 U/L (ref 39–117)
ALT SERPL W P-5'-P-CCNC: 23 U/L (ref 1–33)
ANION GAP SERPL CALCULATED.3IONS-SCNC: 9 MMOL/L (ref 5–15)
AST SERPL-CCNC: 22 U/L (ref 1–32)
BASOPHILS # BLD AUTO: 0.01 10*3/MM3 (ref 0–0.2)
BASOPHILS NFR BLD AUTO: 0.1 % (ref 0–1.5)
BILIRUB SERPL-MCNC: 0.2 MG/DL (ref 0–1.2)
BUN SERPL-MCNC: 21 MG/DL (ref 8–23)
BUN/CREAT SERPL: 16.3 (ref 7–25)
CALCIUM SPEC-SCNC: 10.1 MG/DL (ref 8.6–10.5)
CHLORIDE SERPL-SCNC: 102 MMOL/L (ref 98–107)
CO2 SERPL-SCNC: 26 MMOL/L (ref 22–29)
CREAT SERPL-MCNC: 1.29 MG/DL (ref 0.57–1)
DEPRECATED RDW RBC AUTO: 47 FL (ref 37–54)
EGFRCR SERPLBLD CKD-EPI 2021: 46.2 ML/MIN/1.73
EOSINOPHIL # BLD AUTO: 0.08 10*3/MM3 (ref 0–0.4)
EOSINOPHIL NFR BLD AUTO: 1 % (ref 0.3–6.2)
ERYTHROCYTE [DISTWIDTH] IN BLOOD BY AUTOMATED COUNT: 13.2 % (ref 12.3–15.4)
GLOBULIN UR ELPH-MCNC: 1.8 GM/DL
GLUCOSE SERPL-MCNC: 224 MG/DL (ref 65–99)
HCT VFR BLD AUTO: 29.4 % (ref 34–46.6)
HGB BLD-MCNC: 10.3 G/DL (ref 12–15.9)
IMM GRANULOCYTES # BLD AUTO: 0.02 10*3/MM3 (ref 0–0.05)
IMM GRANULOCYTES NFR BLD AUTO: 0.2 % (ref 0–0.5)
LYMPHOCYTES # BLD AUTO: 2.12 10*3/MM3 (ref 0.7–3.1)
LYMPHOCYTES NFR BLD AUTO: 25.9 % (ref 19.6–45.3)
MAGNESIUM SERPL-MCNC: 1.4 MG/DL (ref 1.6–2.4)
MCH RBC QN AUTO: 35.3 PG (ref 26.6–33)
MCHC RBC AUTO-ENTMCNC: 35 G/DL (ref 31.5–35.7)
MCV RBC AUTO: 100.7 FL (ref 79–97)
MONOCYTES # BLD AUTO: 0.5 10*3/MM3 (ref 0.1–0.9)
MONOCYTES NFR BLD AUTO: 6.1 % (ref 5–12)
NEUTROPHILS NFR BLD AUTO: 5.46 10*3/MM3 (ref 1.7–7)
NEUTROPHILS NFR BLD AUTO: 66.7 % (ref 42.7–76)
NRBC BLD AUTO-RTO: 0 /100 WBC (ref 0–0.2)
PHOSPHATE SERPL-MCNC: 2.9 MG/DL (ref 2.5–4.5)
PLATELET # BLD AUTO: 185 10*3/MM3 (ref 140–450)
PMV BLD AUTO: 12.4 FL (ref 6–12)
POTASSIUM SERPL-SCNC: 4.5 MMOL/L (ref 3.5–5.2)
PROT SERPL-MCNC: 5.5 G/DL (ref 6–8.5)
PTH-INTACT SERPL-MCNC: 30.9 PG/ML (ref 15–65)
RBC # BLD AUTO: 2.92 10*6/MM3 (ref 3.77–5.28)
SODIUM SERPL-SCNC: 137 MMOL/L (ref 136–145)
URATE SERPL-MCNC: 5.6 MG/DL (ref 2.4–5.7)
WBC NRBC COR # BLD: 8.19 10*3/MM3 (ref 3.4–10.8)

## 2023-02-04 NOTE — PROGRESS NOTES
Bridge Appointment completed: Reviewed Discharge Instructions with patient. Patient verbalizes understanding and agreement with the discharge plan using the teachback method.        Vaccinations (gilberto X if applicable and completed):  ( ) Patient states already received influenza vaccine elsewhere  ( ) Patient received influenza vaccine during this hospitalization  ( x) Patient refused influenza vaccine at this time Yolanda Jacobs is a 62 y.o. female seen by diabetes educator 09/28/2017 for review of medications and carbohydrate counting education.     1. Carbohydrate Counting   I. Reviewed carbohydrate-containing foods, standard serving sizes, how to measure foods, and nutrition label reading   II. Provided patient with carbohydrate counting booklet   III. Patient demonstrated understanding by correctly calculating carbohydrate amounts for various meals    2. Humalog Dosing   I. 4 units for every 15 grams of carbohydrate eaten + 3:50 sliding scale   II. Patient demonstrated understanding by calculating correct dosage for example meals and blood sugars.     3. Levemir Dosing switch to Tresiba   I. Decrease dose to 90 units in the morning   II Reviewed titration: if fasting blood sugar is within 30 points of nighttime reading, no change to dose. If rising more than 30 points, increase by 3 units. If dropping more than 30 points, decrease by 3 units.     4. Synjardy   I. 2 tablets before breakfast   II. Explained mechanism of action--encouraged patient to drink plenty of water due to increased risk of dehydration.    5. Metformin   I. Stop as it is in Synjardy    6. Trulicity   I. Once weekly injection with single-dose pen   II. Discussed storage and disposal   III. Reviewed injection sites, site rotation, and skin prep   IV. Patient practiced with demo pen in office   V. Discussed side effects: early satiety, nausea, vomiting. Instructed patient to stop and call us if vomiting occurs.     Provided contact information.     Belén Puga RD, CDE

## 2023-02-27 ENCOUNTER — TELEPHONE (OUTPATIENT)
Dept: PRIMARY CARE CLINIC | Age: 68
End: 2023-02-27

## 2023-02-27 NOTE — TELEPHONE ENCOUNTER
Attempted to schedule AWV for Medicare. No answer at phone listed. Message left for pt to call the office to schedule.

## 2023-03-07 ENCOUNTER — APPOINTMENT (OUTPATIENT)
Dept: CT IMAGING | Facility: HOSPITAL | Age: 68
End: 2023-03-07
Payer: MEDICARE

## 2023-03-07 ENCOUNTER — HOSPITAL ENCOUNTER (EMERGENCY)
Facility: HOSPITAL | Age: 68
Discharge: HOME | End: 2023-03-08
Attending: FAMILY MEDICINE | Admitting: FAMILY MEDICINE
Payer: MEDICARE

## 2023-03-07 DIAGNOSIS — R10.9 ABDOMINAL PAIN, UNSPECIFIED ABDOMINAL LOCATION: ICD-10-CM

## 2023-03-07 DIAGNOSIS — E83.42 HYPOMAGNESEMIA: ICD-10-CM

## 2023-03-07 DIAGNOSIS — N39.0 URINARY TRACT INFECTION WITHOUT HEMATURIA, SITE UNSPECIFIED: Primary | ICD-10-CM

## 2023-03-07 LAB
ALBUMIN SERPL-MCNC: 4 G/DL (ref 3.5–5.2)
ALBUMIN/GLOB SERPL: 1.6 G/DL
ALP SERPL-CCNC: 112 U/L (ref 39–117)
ALT SERPL W P-5'-P-CCNC: 31 U/L (ref 1–33)
ANION GAP SERPL CALCULATED.3IONS-SCNC: 9 MMOL/L (ref 5–15)
AST SERPL-CCNC: 22 U/L (ref 1–32)
BACTERIA UR QL AUTO: ABNORMAL /HPF
BASOPHILS # BLD AUTO: 0.02 10*3/MM3 (ref 0–0.2)
BASOPHILS NFR BLD AUTO: 0.2 % (ref 0–1.5)
BILIRUB SERPL-MCNC: 0.4 MG/DL (ref 0–1.2)
BILIRUB UR QL STRIP: NEGATIVE
BUN SERPL-MCNC: 40 MG/DL (ref 8–23)
BUN/CREAT SERPL: 38.1 (ref 7–25)
CALCIUM SPEC-SCNC: 10.5 MG/DL (ref 8.6–10.5)
CHLORIDE SERPL-SCNC: 101 MMOL/L (ref 98–107)
CK SERPL-CCNC: 52 U/L (ref 20–180)
CLARITY UR: ABNORMAL
CO2 SERPL-SCNC: 30 MMOL/L (ref 22–29)
COLOR UR: YELLOW
CREAT SERPL-MCNC: 1.05 MG/DL (ref 0.57–1)
D-LACTATE SERPL-SCNC: 0.9 MMOL/L (ref 0.5–2)
DEPRECATED RDW RBC AUTO: 46.7 FL (ref 37–54)
EGFRCR SERPLBLD CKD-EPI 2021: 59.1 ML/MIN/1.73
EOSINOPHIL # BLD AUTO: 0.07 10*3/MM3 (ref 0–0.4)
EOSINOPHIL NFR BLD AUTO: 0.6 % (ref 0.3–6.2)
ERYTHROCYTE [DISTWIDTH] IN BLOOD BY AUTOMATED COUNT: 13.1 % (ref 12.3–15.4)
GLOBULIN UR ELPH-MCNC: 2.5 GM/DL
GLUCOSE SERPL-MCNC: 156 MG/DL (ref 65–99)
GLUCOSE UR STRIP-MCNC: NEGATIVE MG/DL
HCT VFR BLD AUTO: 30.7 % (ref 34–46.6)
HGB BLD-MCNC: 10 G/DL (ref 12–15.9)
HGB UR QL STRIP.AUTO: NEGATIVE
HOLD SPECIMEN: NORMAL
HYALINE CASTS UR QL AUTO: ABNORMAL /LPF
IMM GRANULOCYTES # BLD AUTO: 0.04 10*3/MM3 (ref 0–0.05)
IMM GRANULOCYTES NFR BLD AUTO: 0.3 % (ref 0–0.5)
KETONES UR QL STRIP: NEGATIVE
LEUKOCYTE ESTERASE UR QL STRIP.AUTO: ABNORMAL
LIPASE SERPL-CCNC: 9 U/L (ref 13–60)
LYMPHOCYTES # BLD AUTO: 2.22 10*3/MM3 (ref 0.7–3.1)
LYMPHOCYTES NFR BLD AUTO: 19.2 % (ref 19.6–45.3)
MAGNESIUM SERPL-MCNC: 1.5 MG/DL (ref 1.6–2.4)
MCH RBC QN AUTO: 31.4 PG (ref 26.6–33)
MCHC RBC AUTO-ENTMCNC: 32.6 G/DL (ref 31.5–35.7)
MCV RBC AUTO: 96.5 FL (ref 79–97)
MONOCYTES # BLD AUTO: 0.93 10*3/MM3 (ref 0.1–0.9)
MONOCYTES NFR BLD AUTO: 8 % (ref 5–12)
NEUTROPHILS NFR BLD AUTO: 71.7 % (ref 42.7–76)
NEUTROPHILS NFR BLD AUTO: 8.31 10*3/MM3 (ref 1.7–7)
NITRITE UR QL STRIP: POSITIVE
NRBC BLD AUTO-RTO: 0 /100 WBC (ref 0–0.2)
PH UR STRIP.AUTO: 7 [PH] (ref 5–8)
PLATELET # BLD AUTO: 257 10*3/MM3 (ref 140–450)
PMV BLD AUTO: 10.4 FL (ref 6–12)
POTASSIUM SERPL-SCNC: 5 MMOL/L (ref 3.5–5.2)
PROT SERPL-MCNC: 6.5 G/DL (ref 6–8.5)
PROT UR QL STRIP: NEGATIVE
RBC # BLD AUTO: 3.18 10*6/MM3 (ref 3.77–5.28)
RBC # UR STRIP: ABNORMAL /HPF
REF LAB TEST METHOD: ABNORMAL
SODIUM SERPL-SCNC: 140 MMOL/L (ref 136–145)
SP GR UR STRIP: 1.01 (ref 1–1.03)
SQUAMOUS #/AREA URNS HPF: ABNORMAL /HPF
UROBILINOGEN UR QL STRIP: ABNORMAL
WBC # UR STRIP: ABNORMAL /HPF
WBC NRBC COR # BLD: 11.59 10*3/MM3 (ref 3.4–10.8)
WHOLE BLOOD HOLD COAG: NORMAL
WHOLE BLOOD HOLD SPECIMEN: NORMAL

## 2023-03-07 PROCEDURE — 83605 ASSAY OF LACTIC ACID: CPT | Performed by: FAMILY MEDICINE

## 2023-03-07 PROCEDURE — 87186 SC STD MICRODIL/AGAR DIL: CPT | Performed by: PHYSICIAN ASSISTANT

## 2023-03-07 PROCEDURE — P9612 CATHETERIZE FOR URINE SPEC: HCPCS

## 2023-03-07 PROCEDURE — 87150 DNA/RNA AMPLIFIED PROBE: CPT | Performed by: PHYSICIAN ASSISTANT

## 2023-03-07 PROCEDURE — 83735 ASSAY OF MAGNESIUM: CPT | Performed by: FAMILY MEDICINE

## 2023-03-07 PROCEDURE — 87088 URINE BACTERIA CULTURE: CPT | Performed by: PHYSICIAN ASSISTANT

## 2023-03-07 PROCEDURE — 36415 COLL VENOUS BLD VENIPUNCTURE: CPT

## 2023-03-07 PROCEDURE — 83690 ASSAY OF LIPASE: CPT | Performed by: FAMILY MEDICINE

## 2023-03-07 PROCEDURE — 96365 THER/PROPH/DIAG IV INF INIT: CPT

## 2023-03-07 PROCEDURE — 80053 COMPREHEN METABOLIC PANEL: CPT | Performed by: FAMILY MEDICINE

## 2023-03-07 PROCEDURE — 85025 COMPLETE CBC W/AUTO DIFF WBC: CPT | Performed by: FAMILY MEDICINE

## 2023-03-07 PROCEDURE — 25010000002 CEFTRIAXONE PER 250 MG: Performed by: PHYSICIAN ASSISTANT

## 2023-03-07 PROCEDURE — 99284 EMERGENCY DEPT VISIT MOD MDM: CPT

## 2023-03-07 PROCEDURE — 82550 ASSAY OF CK (CPK): CPT | Performed by: FAMILY MEDICINE

## 2023-03-07 PROCEDURE — 87086 URINE CULTURE/COLONY COUNT: CPT | Performed by: PHYSICIAN ASSISTANT

## 2023-03-07 PROCEDURE — 74176 CT ABD & PELVIS W/O CONTRAST: CPT

## 2023-03-07 PROCEDURE — 81001 URINALYSIS AUTO W/SCOPE: CPT | Performed by: FAMILY MEDICINE

## 2023-03-07 PROCEDURE — 87040 BLOOD CULTURE FOR BACTERIA: CPT | Performed by: PHYSICIAN ASSISTANT

## 2023-03-07 RX ORDER — MAGNESIUM SULFATE HEPTAHYDRATE 40 MG/ML
2 INJECTION, SOLUTION INTRAVENOUS ONCE
Status: COMPLETED | OUTPATIENT
Start: 2023-03-07 | End: 2023-03-08

## 2023-03-07 RX ORDER — CIPROFLOXACIN 500 MG/1
500 TABLET, FILM COATED ORAL 2 TIMES DAILY
Qty: 10 TABLET | Refills: 0 | Status: SHIPPED | OUTPATIENT
Start: 2023-03-07 | End: 2023-03-12

## 2023-03-07 RX ORDER — SODIUM CHLORIDE 0.9 % (FLUSH) 0.9 %
10 SYRINGE (ML) INJECTION AS NEEDED
Status: DISCONTINUED | OUTPATIENT
Start: 2023-03-07 | End: 2023-03-08 | Stop reason: HOSPADM

## 2023-03-07 RX ADMIN — SODIUM CHLORIDE 1000 ML: 9 INJECTION, SOLUTION INTRAVENOUS at 22:54

## 2023-03-07 RX ADMIN — CEFTRIAXONE 1 G: 1 INJECTION, POWDER, FOR SOLUTION INTRAMUSCULAR; INTRAVENOUS at 22:55

## 2023-03-08 VITALS
OXYGEN SATURATION: 93 % | DIASTOLIC BLOOD PRESSURE: 87 MMHG | TEMPERATURE: 98.9 F | HEART RATE: 105 BPM | RESPIRATION RATE: 18 BRPM | SYSTOLIC BLOOD PRESSURE: 145 MMHG

## 2023-03-08 LAB
BACTERIA BLD CULT: ABNORMAL
BOTTLE TYPE: ABNORMAL
HOLD SPECIMEN: NORMAL

## 2023-03-08 PROCEDURE — 25010000002 MAGNESIUM SULFATE 2 GM/50ML SOLUTION: Performed by: PHYSICIAN ASSISTANT

## 2023-03-08 PROCEDURE — 96367 TX/PROPH/DG ADDL SEQ IV INF: CPT

## 2023-03-08 RX ADMIN — MAGNESIUM SULFATE HEPTAHYDRATE 2 G: 2 INJECTION, SOLUTION INTRAVENOUS at 00:14

## 2023-03-08 NOTE — DISCHARGE INSTRUCTIONS
Today your imaging is well-appearing with no obstructions and no acute findings.  You do have a urinary tract infection for which she will need to complete your antibiotics in their entirety.  Please stay well rested, well-hydrated.  Please follow-up with your primary care provider for reevaluation within the next 48 hours.  Should you develop any new or worsening symptoms please return to the ER for further evaluation.

## 2023-03-08 NOTE — ED PROVIDER NOTES
Subjective   History of Present Illness    Review of Systems    Past Medical History:   Diagnosis Date   • COPD (chronic obstructive pulmonary disease) (CMS/HCC)    • Diabetes mellitus (CMS/HCC)    • HTN (hypertension)    • Hyperlipidemia    • Stroke (CMS/HCC)    • Tobacco use 6/15/2017       Allergies   Allergen Reactions   • Ceftin [Cefuroxime Axetil] Swelling   • Contrast Dye (Echo Or Unknown Ct/Mr) Swelling   • Iodinated Contrast Media Swelling   • Azithromycin Provider Review Needed   • Prednisone Provider Review Needed   • Codeine Nausea And Vomiting       Past Surgical History:   Procedure Laterality Date   • APPENDECTOMY     • CARDIAC CATHETERIZATION Right 7/28/2021    Procedure: Left Heart Cath w poss intervention, right radial access, US guided;  Surgeon: Zan Alston DO;  Location:  [facility] CATH INVASIVE LOCATION;  Service: Cardiovascular;  Laterality: Right;   • CHOLECYSTECTOMY     • HYSTERECTOMY     • KNEE SURGERY         Family History   Problem Relation Age of Onset   • Diabetes Mother    • Cancer Mother    • Lung cancer Father    • No Known Problems Sister    • Cancer Brother        Social History     Socioeconomic History   • Marital status:    Tobacco Use   • Smoking status: Former     Packs/day: 0.50     Types: Cigarettes   • Smokeless tobacco: Never   Vaping Use   • Vaping Use: Never used   Substance and Sexual Activity   • Alcohol use: No   • Drug use: No   • Sexual activity: Defer           Objective   Physical Exam    Procedures           ED Course  ED Course as of 03/07/23 2246   Tue Mar 07, 2023   2146 Patient care transitioned to Evin Oneil PA-C.  Patient did have an elevation of her lactic acid.  Patient had a slight elevation of her white blood cell count of 11.5.  Patient did have nitrite positive leukocyte positive urine with 4+ bacteria.  Patient had too numerous to count WBCs.  Patient likely did have a urinary tract infection.  With the patient's pain in her  right lower quadrant CT of the abdomen pelvis was performed.  Patient is a nursing home resident and x-ray was done there which showed no obstruction. [RP]   2150 Assumed care of patient at this time from Dr. Dorian Rosales.  Pending results of CT imaging will reevaluate and disposition accordingly.  Please see Dr. Rosales's note above for HPI, ROS, physical examination findings. [JS]   2225 CT abd/pel showed: No acute abnormality. [JS]   2235 Upon reevaluation at this time patient resting in bed stating that she is still having intermittent pains to her right lower quadrant for which she is requesting medication.  Discussed with patient as well as son at bedside need for antibiotics prior to discharge.  Discussed need for continued close PCP follow-up within the next 48 hours for reevaluation, outpatient antibiotic compliance, as well as emphasis on hydration.  Reviewed strict return precautions and need for immediate return to ED should she develop any new or worsening symptoms.  Patient is appreciative, tolerating p.o. fluids, with stable vital signs.  Patient and son with no further questions, concerns, or needs at this time and patient is stable for discharge. [JS]      ED Course User Index  [JS] Evin Oneil PA-C  [RP] Dorian Rosales MD                                           Medical Decision Making  Abdominal pain, unspecified abdominal location: acute illness or injury  Hypomagnesemia: acute illness or injury  Urinary tract infection without hematuria, site unspecified: acute illness or injury  Amount and/or Complexity of Data Reviewed  Independent Historian:      Details: Son  External Data Reviewed: labs, radiology and notes.  Labs: ordered. Decision-making details documented in ED Course.  Radiology: ordered. Decision-making details documented in ED Course.  ECG/medicine tests: ordered. Decision-making details documented in ED Course.  Discussion of management or test interpretation with external  provider(s): Dr. Dorian Rosales (attending)    Risk  Prescription drug management.          Final diagnoses:   Urinary tract infection without hematuria, site unspecified   Abdominal pain, unspecified abdominal location   Hypomagnesemia       ED Disposition  ED Disposition     ED Disposition   Discharge    Condition   Stable    Comment   --             Ramana Lynn MD  [address] [place] [address] [phone]    Schedule an appointment as soon as possible for a visit in 2 days      [facility]  [address] [place] [id number] [phone]    As needed         Medication List      New Prescriptions    ciprofloxacin 500 MG tablet  Commonly known as: CIPRO  Take 1 tablet by mouth 2 (Two) Times a Day for 5 days.        Changed    aspirin 81 MG tablet  Take 1 tablet by mouth Daily.  What changed: how much to take     gabapentin 400 MG capsule  Commonly known as: NEURONTIN  2 capsules TID  What changed:   · how much to take  · when to take this           Where to Get Your Medications      These medications were sent to [facility] - [place], [place] - [address] - [phone] [facility] [phone] [place] [address] [address], [facility] [address]    Phone: [phone]   · ciprofloxacin 500 MG tablet          Evin Oneil PA-C  03/07/23 3459

## 2023-03-08 NOTE — ED PROVIDER NOTES
Subjective   History of Present Illness  67-year-old female who was sent here to the emergency room for evaluation of possible bowel obstruction.  Patient is a nursing home patient.  Patient states that she did have a bowel movement yesterday.  Patient has pain in her right lower quadrant.  Patient has had no nausea vomiting.  Patient has no fevers or chills.  Patient states that she has been passing gas.  Patient denies any other symptoms at this time.        Review of Systems   Gastrointestinal: Positive for abdominal pain.   All other systems reviewed and are negative.      Past Medical History:   Diagnosis Date   • COPD (chronic obstructive pulmonary disease) (HCC)    • Diabetes mellitus (HCC)    • HTN (hypertension)    • Hyperlipidemia    • Stroke (HCC)    • Tobacco use 6/15/2017       Allergies   Allergen Reactions   • Ceftin [Cefuroxime Axetil] Swelling   • Contrast Dye (Echo Or Unknown Ct/Mr) Swelling   • Iodinated Contrast Media Swelling   • Azithromycin Provider Review Needed   • Prednisone Provider Review Needed   • Codeine Nausea And Vomiting       Past Surgical History:   Procedure Laterality Date   • APPENDECTOMY     • CARDIAC CATHETERIZATION Right 7/28/2021    Procedure: Left Heart Cath w poss intervention, right radial access, US guided;  Surgeon: Zan Alston DO;  Location:  [facility];  Service: Cardiovascular;  Laterality: Right;   • CHOLECYSTECTOMY     • HYSTERECTOMY     • KNEE SURGERY         Family History   Problem Relation Age of Onset   • Diabetes Mother    • Cancer Mother    • Lung cancer Father    • No Known Problems Sister    • Cancer Brother        Social History     Socioeconomic History   • Marital status:    Tobacco Use   • Smoking status: Former     Packs/day: 0.50     Types: Cigarettes   • Smokeless tobacco: Never   Vaping Use   • Vaping Use: Never used   Substance and Sexual Activity   • Alcohol use: No   • Drug use: No   • Sexual activity:  Defer           Objective   Physical Exam  Vitals and nursing note reviewed.   Constitutional:       Appearance: She is well-developed.   HENT:      Head: Normocephalic and atraumatic.   Eyes:      General: No scleral icterus.  Cardiovascular:      Rate and Rhythm: Normal rate and regular rhythm.      Heart sounds: Normal heart sounds.   Pulmonary:      Effort: Pulmonary effort is normal.      Breath sounds: Normal breath sounds.   Abdominal:      General: Bowel sounds are normal.      Palpations: Abdomen is soft.      Tenderness: There is abdominal tenderness in the right lower quadrant. There is no guarding or rebound.   Skin:     General: Skin is warm and dry.   Neurological:      General: No focal deficit present.      Mental Status: She is alert.   Psychiatric:         Mood and Affect: Mood normal.         Behavior: Behavior normal.         Procedures           ED Course  ED Course as of 03/08/23 1719 Tue Mar 07, 2023   2146 Patient care transitioned to Evin Oneil PA-C.  Patient did have an elevation of her lactic acid.  Patient had a slight elevation of her white blood cell count of 11.5.  Patient did have nitrite positive leukocyte positive urine with 4+ bacteria.  Patient had too numerous to count WBCs.  Patient likely did have a urinary tract infection.  With the patient's pain in her right lower quadrant CT of the abdomen pelvis was performed.  Patient is a nursing home resident and x-ray was done there which showed no obstruction. [RP]   2150 Assumed care of patient at this time from Dr. Dorian Rosales.  Pending results of CT imaging will reevaluate and disposition accordingly.  Please see Dr. Rosales's note above for HPI, ROS, physical examination findings. [JS]   2225 CT abd/pel showed: No acute abnormality. [JS]   2235 Upon reevaluation at this time patient resting in bed stating that she is still having intermittent pains to her right lower quadrant for which she is requesting medication.  Discussed  with patient as well as son at bedside need for antibiotics prior to discharge.  Discussed need for continued close PCP follow-up within the next 48 hours for reevaluation, outpatient antibiotic compliance, as well as emphasis on hydration.  Reviewed strict return precautions and need for immediate return to ED should she develop any new or worsening symptoms.  Patient is appreciative, tolerating p.o. fluids, with stable vital signs.  Patient and son with no further questions, concerns, or needs at this time and patient is stable for discharge. [JS]      ED Course User Index  [JS] Evin Oneil PA-C  [RP] Dorian Rosales MD                                         Lab Results (last 24 hours)     Procedure Component Value Units Date/Time    CBC & Differential [[id number]]  (Abnormal) Collected: 03/07/23 2019    Specimen: Blood Updated: 03/07/23 2028    Narrative:      The following orders were created for panel order CBC & Differential.  Procedure                               Abnormality         Status                     ---------                               -----------         ------                     CBC Auto Differential[[id number]]        Abnormal            Final result                 Please view results for these tests on the individual orders.    Comprehensive Metabolic Panel [[id number]]  (Abnormal) Collected: 03/07/23 2019    Specimen: Blood Updated: 03/07/23 2046     Glucose 156 mg/dL      BUN 40 mg/dL      Creatinine 1.05 mg/dL      Sodium 140 mmol/L      Potassium 5.0 mmol/L      Chloride 101 mmol/L      CO2 30.0 mmol/L      Calcium 10.5 mg/dL      Total Protein 6.5 g/dL      Albumin 4.0 g/dL      ALT (SGPT) 31 U/L      AST (SGOT) 22 U/L      Alkaline Phosphatase 112 U/L      Total Bilirubin 0.4 mg/dL      Globulin 2.5 gm/dL      A/G Ratio 1.6 g/dL      BUN/Creatinine Ratio 38.1     Anion Gap 9.0 mmol/L      eGFR 59.1 mL/min/1.73     Narrative:      GFR Normal >60  Chronic Kidney Disease <60  Kidney  Failure <15      Lipase [[id number]]  (Abnormal) Collected: 03/07/23 2019    Specimen: Blood Updated: 03/07/23 2041     Lipase 9 U/L     Lactic Acid, Plasma [[id number]]  (Normal) Collected: 03/07/23 2019    Specimen: Blood Updated: 03/07/23 2042     Lactate 0.9 mmol/L     CK [[id number]]  (Normal) Collected: 03/07/23 2019    Specimen: Blood Updated: 03/07/23 2045     Creatine Kinase 52 U/L     Magnesium [[id number]]  (Abnormal) Collected: 03/07/23 2019    Specimen: Blood Updated: 03/07/23 2040     Magnesium 1.5 mg/dL     CBC Auto Differential [[id number]]  (Abnormal) Collected: 03/07/23 2019    Specimen: Blood Updated: 03/07/23 2028     WBC 11.59 10*3/mm3      RBC 3.18 10*6/mm3      Hemoglobin 10.0 g/dL      Hematocrit 30.7 %      MCV 96.5 fL      MCH 31.4 pg      MCHC 32.6 g/dL      RDW 13.1 %      RDW-SD 46.7 fl      MPV 10.4 fL      Platelets 257 10*3/mm3      Neutrophil % 71.7 %      Lymphocyte % 19.2 %      Monocyte % 8.0 %      Eosinophil % 0.6 %      Basophil % 0.2 %      Immature Grans % 0.3 %      Neutrophils, Absolute 8.31 10*3/mm3      Lymphocytes, Absolute 2.22 10*3/mm3      Monocytes, Absolute 0.93 10*3/mm3      Eosinophils, Absolute 0.07 10*3/mm3      Basophils, Absolute 0.02 10*3/mm3      Immature Grans, Absolute 0.04 10*3/mm3      nRBC 0.0 /100 WBC     [place] Blood Culture Bottle Set [[id number]] Collected: 03/07/23 2019    Specimen: Blood from Arm, Right Updated: 03/07/23 2131     Extra Tube Hold for add-ons.     Comment: Auto resulted.       Blood Culture - Blood, Arm, Right [[id number]] Collected: 03/07/23 2019    Specimen: Blood from Arm, Right Updated: 03/07/23 2201    Urinalysis With Microscopic If Indicated (No Culture) - Straight Cath [[id number]]  (Abnormal) Collected: 03/07/23 2120    Specimen: Urine from Straight Cath Updated: 03/07/23 2136     Color, UA Yellow     Appearance, UA Cloudy     pH, UA 7.0     Specific Gravity, UA 1.015     Glucose, UA Negative     Ketones, UA Negative     Bilirubin,  UA Negative     Blood, UA Negative     Protein, UA Negative     Leuk Esterase, UA Large (3+)     Nitrite, UA Positive     Urobilinogen, UA 0.2 E.U./dL    Urinalysis, Microscopic Only - Straight Cath [[id number]]  (Abnormal) Collected: 03/07/23 2120    Specimen: Urine from Straight Cath Updated: 03/07/23 2136     RBC, UA 0-2 /HPF      WBC, UA Too Numerous to Count /HPF      Bacteria, UA 4+ /HPF      Squamous Epithelial Cells, UA 0-2 /HPF      Hyaline Casts, UA 3-6 /LPF      Methodology Automated Microscopy    Urine Culture - Urine, Straight Cath [[id number]] Collected: 03/07/23 2120    Specimen: Urine from Straight Cath Updated: 03/08/23 1142    Blood Culture - Blood, Arm, Right [[id number]]  (Abnormal) Collected: 03/07/23 2253    Specimen: Blood from Arm, Right Updated: 03/08/23 1206     Blood Culture Abnormal Stain     Gram Stain Aerobic Bottle Gram negative bacilli      Anaerobic Bottle Gram negative bacilli    Blood Culture ID, PCR - Blood, Arm, Right [[id number]]  (Abnormal) Collected: 03/07/23 2253    Specimen: Blood from Arm, Right Updated: 03/08/23 1317     BCID, PCR Escherichia coli. Identification by BCID2 PCR.     BOTTLE TYPE Aerobic Bottle    Narrative:      No resistance genes detected.          MDM    Final diagnoses:   Urinary tract infection without hematuria, site unspecified   Abdominal pain, unspecified abdominal location   Hypomagnesemia       ED Disposition  ED Disposition     ED Disposition   Discharge    Condition   Stable    Comment   --             Ramana Lynn MD  [address] [place] [id number] [phone]    Schedule an appointment as soon as possible for a visit in 2 days      [facility] Emergency Department  [address] [place] [id number] [phone]    As needed         Medication List      New Prescriptions    ciprofloxacin 500 MG tablet  Commonly known as: CIPRO  Take 1 tablet by mouth 2 (Two) Times a Day for 5 days.        Changed    aspirin 81 MG  tablet  Take 1 tablet by mouth Daily.  What changed: how much to take     gabapentin 400 MG capsule  Commonly known as: NEURONTIN  2 capsules TID  What changed:   · how much to take  · when to take this           Where to Get Your Medications      These medications were sent to [facility] - [address] - [phone]  - [phone]   [address] [place] [address]    Phone: [phone]   · ciprofloxacin 500 MG tablet          Dorian Rosales MD  03/08/23 7384

## 2023-03-10 ENCOUNTER — TELEPHONE (OUTPATIENT)
Dept: EMERGENCY DEPT | Facility: HOSPITAL | Age: 68
End: 2023-03-10
Payer: MEDICARE

## 2023-03-10 LAB
BACTERIA SPEC AEROBE CULT: ABNORMAL
BACTERIA SPEC AEROBE CULT: ABNORMAL
GRAM STN SPEC: ABNORMAL
GRAM STN SPEC: ABNORMAL
ISOLATED FROM: ABNORMAL

## 2023-03-10 NOTE — TELEPHONE ENCOUNTER
Returned voicemail. States patient has a history of stroke and was in a nursing home in [place] but recently transferred to [place]. Unsure which nursing home she is currently at. States it is not [facility] but may be [place]. Unsure of how to contact her otherwise.     Will continue to review chart to determine patient's residence.

## 2023-03-12 LAB — BACTERIA SPEC AEROBE CULT: NORMAL

## 2023-10-11 ENCOUNTER — LAB (OUTPATIENT)
Dept: LAB | Facility: HOSPITAL | Age: 68
End: 2023-10-11
Payer: MEDICARE

## 2023-10-11 ENCOUNTER — OFFICE VISIT (OUTPATIENT)
Dept: SURGERY | Facility: CLINIC | Age: 68
End: 2023-10-11
Payer: MEDICARE

## 2023-10-11 VITALS
SYSTOLIC BLOOD PRESSURE: 100 MMHG | HEIGHT: 62 IN | BODY MASS INDEX: 28.52 KG/M2 | HEART RATE: 87 BPM | OXYGEN SATURATION: 95 % | DIASTOLIC BLOOD PRESSURE: 72 MMHG | WEIGHT: 155 LBS

## 2023-10-11 DIAGNOSIS — R10.31 RIGHT LOWER QUADRANT ABDOMINAL PAIN: ICD-10-CM

## 2023-10-11 DIAGNOSIS — E08.43 DIABETES MELLITUS DUE TO UNDERLYING CONDITION WITH DIABETIC AUTONOMIC NEUROPATHY, WITH LONG-TERM CURRENT USE OF INSULIN: ICD-10-CM

## 2023-10-11 DIAGNOSIS — Z79.4 DIABETES MELLITUS DUE TO UNDERLYING CONDITION WITH DIABETIC AUTONOMIC NEUROPATHY, WITH LONG-TERM CURRENT USE OF INSULIN: ICD-10-CM

## 2023-10-11 DIAGNOSIS — K42.9 UMBILICAL HERNIA WITHOUT OBSTRUCTION AND WITHOUT GANGRENE: Primary | ICD-10-CM

## 2023-10-11 DIAGNOSIS — E66.3 OVERWEIGHT WITH BODY MASS INDEX (BMI) OF 28 TO 28.9 IN ADULT: ICD-10-CM

## 2023-10-11 DIAGNOSIS — K42.9 UMBILICAL HERNIA WITHOUT OBSTRUCTION AND WITHOUT GANGRENE: ICD-10-CM

## 2023-10-11 LAB — HBA1C MFR BLD: 6.9 % (ref 4.8–5.6)

## 2023-10-11 PROCEDURE — 83036 HEMOGLOBIN GLYCOSYLATED A1C: CPT

## 2023-10-11 PROCEDURE — 36415 COLL VENOUS BLD VENIPUNCTURE: CPT

## 2023-10-15 NOTE — PATIENT INSTRUCTIONS

## 2023-10-27 ENCOUNTER — HOSPITAL ENCOUNTER (OUTPATIENT)
Dept: CT IMAGING | Facility: HOSPITAL | Age: 68
Discharge: HOME | End: 2023-10-27
Admitting: STUDENT IN AN ORGANIZED HEALTH CARE EDUCATION/TRAINING PROGRAM
Payer: MEDICARE

## 2023-10-27 DIAGNOSIS — K42.9 UMBILICAL HERNIA WITHOUT OBSTRUCTION AND WITHOUT GANGRENE: ICD-10-CM

## 2023-10-27 DIAGNOSIS — R10.31 RIGHT LOWER QUADRANT ABDOMINAL PAIN: ICD-10-CM

## 2023-10-27 PROCEDURE — 74176 CT ABD & PELVIS W/O CONTRAST: CPT

## 2023-11-01 ENCOUNTER — OFFICE VISIT (OUTPATIENT)
Dept: SURGERY | Facility: CLINIC | Age: 68
End: 2023-11-01
Payer: MEDICARE

## 2023-11-01 VITALS
SYSTOLIC BLOOD PRESSURE: 138 MMHG | BODY MASS INDEX: 28.52 KG/M2 | WEIGHT: 155 LBS | OXYGEN SATURATION: 94 % | HEART RATE: 160 BPM | DIASTOLIC BLOOD PRESSURE: 81 MMHG | HEIGHT: 62 IN

## 2023-11-01 DIAGNOSIS — K43.9 VENTRAL HERNIA WITHOUT OBSTRUCTION OR GANGRENE: ICD-10-CM

## 2023-11-01 DIAGNOSIS — K42.9 UMBILICAL HERNIA WITHOUT OBSTRUCTION AND WITHOUT GANGRENE: Primary | ICD-10-CM

## 2023-11-01 DIAGNOSIS — E66.3 OVERWEIGHT WITH BODY MASS INDEX (BMI) OF 28 TO 28.9 IN ADULT: ICD-10-CM

## 2023-11-01 DIAGNOSIS — Z79.4 DIABETES MELLITUS DUE TO UNDERLYING CONDITION WITH DIABETIC AUTONOMIC NEUROPATHY, WITH LONG-TERM CURRENT USE OF INSULIN: ICD-10-CM

## 2023-11-01 DIAGNOSIS — E08.43 DIABETES MELLITUS DUE TO UNDERLYING CONDITION WITH DIABETIC AUTONOMIC NEUROPATHY, WITH LONG-TERM CURRENT USE OF INSULIN: ICD-10-CM

## 2023-11-01 PROCEDURE — 1159F MED LIST DOCD IN RCRD: CPT | Performed by: STUDENT IN AN ORGANIZED HEALTH CARE EDUCATION/TRAINING PROGRAM

## 2023-11-01 PROCEDURE — 3079F DIAST BP 80-89 MM HG: CPT | Performed by: STUDENT IN AN ORGANIZED HEALTH CARE EDUCATION/TRAINING PROGRAM

## 2023-11-01 PROCEDURE — 99214 OFFICE O/P EST MOD 30 MIN: CPT | Performed by: STUDENT IN AN ORGANIZED HEALTH CARE EDUCATION/TRAINING PROGRAM

## 2023-11-01 PROCEDURE — 3075F SYST BP GE 130 - 139MM HG: CPT | Performed by: STUDENT IN AN ORGANIZED HEALTH CARE EDUCATION/TRAINING PROGRAM

## 2023-11-01 PROCEDURE — 1160F RVW MEDS BY RX/DR IN RCRD: CPT | Performed by: STUDENT IN AN ORGANIZED HEALTH CARE EDUCATION/TRAINING PROGRAM

## 2023-11-01 NOTE — PROGRESS NOTES
Office Established Patient Note:     Referring Provider: No ref. provider found    Chief Complaint   Patient presents with    Follow-up     3 wk follow up- pt states she is doing ok, stomach hurts at times       Subjective .     History of present illness:  Yolanda Mendenhall is a 68 y.o. female with a known umbilical hernia. Her symptoms were not classic for an umbilical hernia so she went for CT. She presents today to discuss the CT results. She is having pain around the umbilical hernia. She is wheelchair bound.     BMI is 28. She is on 81 mg ASA. She is a non-smoker. PSH on the abdomen includes appendectomy and cholecystectomy.      History  Past Medical History:   Diagnosis Date    COPD (chronic obstructive pulmonary disease)     Diabetes mellitus     HTN (hypertension)     Hyperlipidemia     Stroke     Tobacco use 6/15/2017   ,   Past Surgical History:   Procedure Laterality Date    APPENDECTOMY      CARDIAC CATHETERIZATION Right 7/28/2021    Procedure: Left Heart Cath w poss intervention, right radial access, US guided;  Surgeon: Zan Alston DO;  Location:  [facility];  Service: Cardiovascular;  Laterality: Right;    CHOLECYSTECTOMY      HYSTERECTOMY      KNEE SURGERY     ,   Family History   Problem Relation Age of Onset    Diabetes Mother     Cancer Mother     Lung cancer Father     No Known Problems Sister     Cancer Brother    ,   Social History     Tobacco Use    Smoking status: Former     Packs/day: .5     Types: Cigarettes    Smokeless tobacco: Never   Vaping Use    Vaping Use: Never used   Substance Use Topics    Alcohol use: No    Drug use: No   , (Not in a hospital admission)   and Allergies:  Ceftin [cefuroxime axetil], Contrast dye (echo or unknown ct/mr), Iodinated contrast media, Azithromycin, Prednisone, and Codeine    Current Outpatient Medications:     albuterol (ACCUNEB) 0.63 MG/3ML nebulizer solution, Take 1 ampule by nebulization every 6 hours as needed for  Wheezing, Disp: , Rfl:     Alcohol Swabs (ALCOHOL WIPES) pads, Use 4 x daily, Disp: 120 each, Rfl: 11    ALPRAZolam (XANAX) 1 MG tablet, Take 1 tablet by mouth 3 (Three) Times a Day As Needed for Anxiety., Disp: , Rfl:     amitriptyline (ELAVIL) 10 MG tablet, Take 1 tablet by mouth Every Night., Disp: , Rfl:     aspirin 81 MG tablet, Take 1 tablet by mouth Daily. (Patient taking differently: Take 325 mg by mouth Daily.), Disp: 30 tablet, Rfl: 11    atorvastatin (LIPITOR) 40 MG tablet, Take 1 tablet by mouth Daily., Disp: , Rfl:     Blood Glucose Monitoring Suppl w/Device kit, USE AS INDICATED, ANY MONITOR, Disp: 1 each, Rfl: 1    calcipotriene (DOVONEX) 0.005 % cream, Apply  topically to the appropriate area as directed 2 (Two) Times a Day., Disp: , Rfl:     cetirizine (zyrTEC) 10 MG tablet, Take 1 tablet by mouth Daily., Disp: , Rfl:     cimetidine (TAGAMET) 300 MG tablet, Take 1 tablet by mouth Take As Directed. 1 tab night before and 1 tab morning before procedure, Disp: 2 tablet, Rfl: 0    clindamycin (Cleocin) 300 MG capsule, Take 1 capsule by mouth 3 (Three) Times a Day., Disp: 21 capsule, Rfl: 0    Continuous Blood Gluc  (Dexcom G6 ) device, USE AS DIRECTED FOR CONTINUOUS BLOOD SUGAR MONITORING, Disp: 1 each, Rfl: 1    Continuous Blood Gluc Sensor (Dexcom G6 Sensor), As Needed (glucose control). Every 10 daysDexcom G6 Sensor Kit [address]-25 Use as directed for continuous glucose monitoring, Disp: 9 each, Rfl: 3    cyclobenzaprine (FLEXERIL) 10 MG tablet, Take 1 tablet by mouth 3 (Three) Times a Day As Needed (arm pain)., Disp: 20 tablet, Rfl: 0    diltiaZEM CD (CARDIZEM CD) 180 MG 24 hr capsule, Take 1 capsule by mouth Daily., Disp: , Rfl:     diphenhydrAMINE (BENADRYL) 25 MG tablet, Take 1 tablet by mouth Take As Directed. 1 tab morning of procedure, Disp: 1 tablet, Rfl: 0    Dulaglutide 0.75 MG/0.5ML solution pen-injector, Inject 0.75 mg under the skin into the appropriate area as directed  1 (One) Time Per Week., Disp: 4 pen, Rfl: 11    escitalopram (LEXAPRO) 20 MG tablet, Take 1 tablet by mouth Daily., Disp: , Rfl:     esomeprazole (nexIUM) 40 MG capsule, Take 1 capsule by mouth Every Morning Before Breakfast., Disp: , Rfl:     fluticasone-salmeterol (ADVAIR) 250-50 MCG/DOSE DISKUS, Inhale 1 puff 2 (Two) Times a Day., Disp: , Rfl:     furosemide (LASIX) 40 MG tablet, Take 1 tablet by mouth 2 (Two) Times a Day., Disp: , Rfl:     gabapentin (NEURONTIN) 400 MG capsule, 2 capsules TID (Patient taking differently: 1 capsule 3 (Three) Times a Day. 2 capsules TID), Disp: 180 capsule, Rfl: 5    Glucose Blood (BLOOD GLUCOSE TEST) strip, Use 4 x daily, use any brand covered by insurance or same brand as before, Disp: 120 each, Rfl: 11    HYDROcodone-acetaminophen (NORCO)  MG per tablet, Take 1 tablet by mouth Every 8 (Eight) Hours As Needed for Moderate Pain., Disp: , Rfl:     insulin degludec (Tresiba FlexTouch) 100 UNIT/ML solution pen-injector injection, Inject 40 Units under the skin into the appropriate area as directed Every Night. E11.9, Disp: 4 pen, Rfl: 11    Insulin Disposable Pump (OmniPod Dash 5 Pack Pods) misc, 1 each Every 72 (Seventy-Two) Hours., Disp: 30 each, Rfl: 11    Insulin Pen Needle (Pen Needles) 32G X 4 MM misc, 1 each 4 (Four) Times a Day. Use 4 x daily, Dx code E11.65, Disp: 120 each, Rfl: 11    Lancet Devices (LANCING DEVICE) misc, USE AS INDICATED TO CORRELATE WITH STRIPS AND METER, Disp: 1 each, Rfl: 1    Lancets 30G misc, USE 4 X DAILY, Disp: 120 each, Rfl: 11    levothyroxine (SYNTHROID, LEVOTHROID) 25 MCG tablet, Take 1 tablet by mouth Daily., Disp: , Rfl:     lisinopril (PRINIVIL,ZESTRIL) 20 MG tablet, Take 2 tablets by mouth Daily., Disp: , Rfl:     NovoLOG 100 UNIT/ML injection, Use up to 100 units a day through pump, Disp: 30 mL, Rfl: 1    potassium bicarbonate (K-LYTE) 25 MEQ disintegrating tablet, Take 1 tablet by mouth 2 (Two) Times a Day., Disp: , Rfl:      "predniSONE (DELTASONE) 20 MG tablet, Take 4 tablets by mouth Take As Directed. 4 tabs 6 hours before procedure, Disp: 4 tablet, Rfl: 0    tiotropium (SPIRIVA) 18 MCG per inhalation capsule, Place 1 capsule into inhaler and inhale Daily., Disp: , Rfl:     varenicline (CHANTIX) 0.5 MG tablet, Take 1 tablet by mouth 2 (Two) Times a Day., Disp: , Rfl:     Objective     Vital Signs   /81   Pulse (!) 160   Ht 157.5 cm (62\")   Wt 70.3 kg (155 lb)   SpO2 94%   BMI 28.35 kg/m²      Physical Exam:  General appearance - alert, chronically ill appearing   Mental status - alert, oriented to person, place, and time  Eyes - pupils equal and reactive, extraocular eye movements intact  Neck - supple, no significant adenopathy  Chest - no tachypnea, retractions or cyanosis  Heart - normal rate and regular rhythm  Abdomen - soft, nontender, nondistended, no masses or organomegaly  Small umbilical hernia.  Neurological - alert, oriented, normal speech, no focal findings or movement disorder noted    Results Review:    The following data was reviewed by: Suma Lopez MD on 11/01/2023:    CT Abdomen Pelvis Without Contrast (10/27/2023 11:23)   1.2 cm umbilical hernia. 4.5  cm infra-umbilical ventral hernia, fat containing.       Assessment & Plan       Diagnoses and all orders for this visit:    1. Umbilical hernia without obstruction and without gangrene (Primary)  -     Hemoglobin A1c; Future    2. Ventral hernia without obstruction or gangrene  -     Hemoglobin A1c; Future    3. Overweight with body mass index (BMI) of 28 to 28.9 in adult    4. Diabetes mellitus due to underlying condition with diabetic autonomic neuropathy, with long-term current use of insulin       Ms. Mendenhall is a 68 year old female with a known umbilical hernia. CT also showed a ventral hernia. Both hernias are fat containing; no bowel in either hernia. Both are reducible. I do not think her pain is from her hernias. I am concerned, and I " discussed this with her, about her risks proceeding with surgery. She has T2DM that is poorly controlled and she is wheelchair bound. Her BMI is elevated at 28. When she was last here, her glucose had been > 300 three times in the past 48 hours. I would like her HgbA1c to be normal before considering repair. I have ordered an A1c and she will get this down in a month then follow up with me.     This is a two chronic problems (umbilical hernia and ventral hernia). I have reviewed the CT above and ordered an A1c. She is at very high risk of complications from hernia repair 2/2 her elevated BMI, wheel chair bound status, and uncontrolled T2DM.         Suma Lopez MD  11/02/23  20:07 CDT

## 2023-11-03 NOTE — PATIENT INSTRUCTIONS

## (undated) DEVICE — SOLIDIFIER LIQUI LOC PLUS 2000CC

## (undated) DEVICE — CANN CO2/O2 NASL A/

## (undated) DEVICE — GW STARTER FXD CORE J .035 3X260CM 3MM

## (undated) DEVICE — PK CATH CARD 30 CA/4

## (undated) DEVICE — SUP ARMBRD ART/LINE BLU

## (undated) DEVICE — A2000 MULTI-USE SYRINGE KIT, P/N 701277-003KIT CONTENTS: 100ML CONTRAST RESERVOIR AND TUBING WITH CONTRAST SPIKE AND CLAMP: Brand: A2000 MULTI-USE SYRINGE KIT

## (undated) DEVICE — DRSNG SURESITE WNDW 4X4.5

## (undated) DEVICE — MODEL AT P65, P/N 701554-001KIT CONTENTS: HAND CONTROLLER, 3-WAY HIGH-PRESSURE STOPCOCK WITH ROTATING END AND PREMIUM HIGH-PRESSURE TUBING: Brand: ANGIOTOUCH® KIT

## (undated) DEVICE — RADIFOCUS OPTITORQUE ANGIOGRAPHIC CATHETER: Brand: OPTITORQUE

## (undated) DEVICE — MODEL BT2000 P/N 700287-012KIT CONTENTS: MANIFOLD WITH SALINE AND CONTRAST PORTS, SALINE TUBING WITH SPIKE AND HAND SYRINGE, TRANSDUCER: Brand: BT2000 AUTOMATED MANIFOLD KIT

## (undated) DEVICE — SOL IRR NACL 0.9PCT BT 1000ML

## (undated) DEVICE — GLIDESHEATH SLENDER STAINLESS STEEL KIT: Brand: GLIDESHEATH SLENDER

## (undated) DEVICE — KT NDL GUIDE STRL 18GA

## (undated) DEVICE — TIBURON BRACHIAL ANGIOGRAPHY DRAPE: Brand: CONVERTORS

## (undated) DEVICE — TR BAND RADIAL ARTERY COMPRESSION DEVICE: Brand: TR BAND

## (undated) DEVICE — PAD, DEFIB, ADULT, RADIOTRANS, PHILIPS: Brand: MEDLINE

## (undated) DEVICE — CATH F5 INF PIG145 110CM 6SH: Brand: INFINITI